# Patient Record
Sex: MALE | Race: WHITE | NOT HISPANIC OR LATINO | Employment: OTHER | URBAN - METROPOLITAN AREA
[De-identification: names, ages, dates, MRNs, and addresses within clinical notes are randomized per-mention and may not be internally consistent; named-entity substitution may affect disease eponyms.]

---

## 2017-02-23 DIAGNOSIS — I71.40 ABDOMINAL AORTIC ANEURYSM (AAA) WITHOUT RUPTURE: Primary | ICD-10-CM

## 2017-03-08 ENCOUNTER — LAB VISIT (OUTPATIENT)
Dept: LAB | Facility: HOSPITAL | Age: 67
End: 2017-03-08
Attending: SURGERY
Payer: MEDICARE

## 2017-03-08 DIAGNOSIS — I71.40 ABDOMINAL AORTIC ANEURYSM (AAA) WITHOUT RUPTURE: ICD-10-CM

## 2017-03-08 LAB
ANION GAP SERPL CALC-SCNC: 7 MMOL/L
BUN SERPL-MCNC: 11 MG/DL
CALCIUM SERPL-MCNC: 8.8 MG/DL
CHLORIDE SERPL-SCNC: 99 MMOL/L
CO2 SERPL-SCNC: 27 MMOL/L
CREAT SERPL-MCNC: 0.8 MG/DL
EST. GFR  (AFRICAN AMERICAN): >60 ML/MIN/1.73 M^2
EST. GFR  (NON AFRICAN AMERICAN): >60 ML/MIN/1.73 M^2
GLUCOSE SERPL-MCNC: 91 MG/DL
POTASSIUM SERPL-SCNC: 4.3 MMOL/L
SODIUM SERPL-SCNC: 133 MMOL/L

## 2017-03-08 PROCEDURE — 36415 COLL VENOUS BLD VENIPUNCTURE: CPT

## 2017-03-08 PROCEDURE — 80048 BASIC METABOLIC PNL TOTAL CA: CPT

## 2017-03-24 ENCOUNTER — HOSPITAL ENCOUNTER (OUTPATIENT)
Dept: RADIOLOGY | Facility: HOSPITAL | Age: 67
Discharge: HOME | End: 2017-03-24
Attending: SURGERY
Payer: MEDICARE

## 2017-03-24 ENCOUNTER — OFFICE VISIT (OUTPATIENT)
Dept: VASCULAR SURGERY | Facility: CLINIC | Age: 67
End: 2017-03-24
Payer: MEDICARE

## 2017-03-24 VITALS
TEMPERATURE: 98 F | HEIGHT: 69 IN | HEART RATE: 87 BPM | WEIGHT: 143 LBS | SYSTOLIC BLOOD PRESSURE: 120 MMHG | BODY MASS INDEX: 21.18 KG/M2 | DIASTOLIC BLOOD PRESSURE: 63 MMHG

## 2017-03-24 DIAGNOSIS — Z72.0 TOBACCO ABUSE DISORDER: ICD-10-CM

## 2017-03-24 DIAGNOSIS — I10 ESSENTIAL HYPERTENSION: ICD-10-CM

## 2017-03-24 DIAGNOSIS — F17.218 NICOTINE DEPENDENCE, CIGARETTES, WITH OTHER NICOTINE-INDUCED DISORDERS: ICD-10-CM

## 2017-03-24 DIAGNOSIS — I71.40 AAA (ABDOMINAL AORTIC ANEURYSM) WITHOUT RUPTURE: Primary | ICD-10-CM

## 2017-03-24 DIAGNOSIS — I71.40 ABDOMINAL AORTIC ANEURYSM (AAA) WITHOUT RUPTURE: ICD-10-CM

## 2017-03-24 PROCEDURE — 1159F MED LIST DOCD IN RCRD: CPT | Mod: S$GLB,,, | Performed by: SURGERY

## 2017-03-24 PROCEDURE — 74174 CTA ABD&PLVS W/CONTRAST: CPT | Mod: 26,,, | Performed by: RADIOLOGY

## 2017-03-24 PROCEDURE — 3074F SYST BP LT 130 MM HG: CPT | Mod: S$GLB,,, | Performed by: SURGERY

## 2017-03-24 PROCEDURE — 99406 BEHAV CHNG SMOKING 3-10 MIN: CPT | Mod: S$GLB,,, | Performed by: SURGERY

## 2017-03-24 PROCEDURE — 3078F DIAST BP <80 MM HG: CPT | Mod: S$GLB,,, | Performed by: SURGERY

## 2017-03-24 PROCEDURE — 1160F RVW MEDS BY RX/DR IN RCRD: CPT | Mod: S$GLB,,, | Performed by: SURGERY

## 2017-03-24 PROCEDURE — 1126F AMNT PAIN NOTED NONE PRSNT: CPT | Mod: S$GLB,,, | Performed by: SURGERY

## 2017-03-24 PROCEDURE — 1157F ADVNC CARE PLAN IN RCRD: CPT | Mod: S$GLB,,, | Performed by: SURGERY

## 2017-03-24 PROCEDURE — 99999 PR PBB SHADOW E&M-EST. PATIENT-LVL III: CPT | Mod: PBBFAC,,, | Performed by: SURGERY

## 2017-03-24 PROCEDURE — 99214 OFFICE O/P EST MOD 30 MIN: CPT | Mod: 25,S$GLB,, | Performed by: SURGERY

## 2017-03-24 RX ADMIN — IOHEXOL 100 ML: 350 INJECTION, SOLUTION INTRAVENOUS at 03:03

## 2017-03-24 NOTE — PROGRESS NOTES
Glen Arroyo  03/24/2017    HPI:  Patient is a 66 y.o. male with a PMhx of HTN, diverticulitis s/p ex-lap with appendectomy and temporary colostomy (28yrs ago), EtOH abuse (4 drinks/day x35yrs), Tobacco abuse (1ppd x50yrs) who was seen in hospital for incidental finding of AAA (7cm) after presenting with migratory abdominal pain.  Underwent EVAR on 10/20/16, had uncomplicated hospitalization was able to be discharged on POD 1.  He had experienced constipation following the EVAR, but it has resolved.  Ambulates without difficulty. Denies SOB, CP, intermittent claudication.     10/26/16 percutaneous EVAR 28 x 14 x 103 Medtronic EIIS; Iliac extension limb (left) 16 x 124 x 10; Iliac extension limb (right) 16 x 124 x 10; Bilateral iliac extension limb self-expanding stent (10 x 40 Life, 10 x 40 EV3) @ aortic bifurcation; R common, external iliac artery PTA (8 x 40, 9 x 40); L common, external iliac artery PTA (8 x 40, 9 x 40)    No prior MI/stroke  + Tobacco use, continues to smoke, but reports that he has decreased to 1/2-3/4 ppd  No FamHx of AAA    Past Medical History:   Diagnosis Date    Diverticulitis     HTN (hypertension)      Past Surgical History:   Procedure Laterality Date    APPENDECTOMY      COLOSTOMY      PLACEMENT AND REMOVAL     No family history on file.  Social History     Social History    Marital status: Single     Spouse name: N/A    Number of children: N/A    Years of education: N/A     Occupational History    Not on file.     Social History Main Topics    Smoking status: Current Every Day Smoker     Packs/day: 1.00     Types: Cigarettes    Smokeless tobacco: Not on file    Alcohol use Yes    Drug use: Not on file    Sexual activity: Not on file     Other Topics Concern    Not on file     Social History Narrative     Current Outpatient Prescriptions on File Prior to Visit   Medication Sig    amlodipine (NORVASC) 2.5 MG tablet Take 1 tablet (2.5 mg total) by mouth once daily.     aspirin (ECOTRIN) 81 MG EC tablet Take 1 tablet (81 mg total) by mouth once daily.    docusate sodium (COLACE) 100 MG capsule Take 100 mg by mouth once daily.    hydrocodone-acetaminophen 5-325mg (NORCO) 5-325 mg per tablet Take 1 tablet by mouth every 4 (four) hours as needed.    linaclotide (LINZESS) 145 mcg Cap capsule Take 145 mcg by mouth once daily.    losartan-hydrochlorothiazide 50-12.5 mg (HYZAAR) 50-12.5 mg per tablet Take 1 tablet by mouth once daily.    TRAMADOL HCL (TRAMADOL HYDROCHLORIDE, BULK, MISC) 50 mg by Misc.(Non-Drug; Combo Route) route.     Current Facility-Administered Medications on File Prior to Visit   Medication    [COMPLETED] omnipaque 350 iohexol 100 mL       REVIEW OF SYSTEMS:  General: negative; ENT: negative; Allergy and Immunology: negative; Hematological and Lymphatic: Negative; Endocrine: negative; Respiratory: no cough, shortness of breath, or wheezing; Cardiovascular: no chest pain or dyspnea on exertion; Gastrointestinal: no abdominal pain/back, change in bowel habits, or bloody stools; Genito-Urinary: no dysuria, trouble voiding, or hematuria; Musculoskeletal: negative  Neurological: no TIA or stroke symptoms; Psychiatric: no nervousness, anxiety or depression.    PHYSICAL EXAM:   Right Arm BP - Sittin/63 (17 1554)  Left Arm BP - Sittin/69 (17 1554)  Pulse: 87  Temp: 97.6 °F (36.4 °C)      General appearance:  Alert, well-appearing, and in no distress.  Oriented to person, place, and time   Neurological: Normal speech, no focal findings noted; CN II - XII grossly intact           Musculoskeletal: Digits/nail without cyanosis/clubbing.  Normal muscle strength/tone.                 Neck: Supple, no significant adenopathy; thyroid is not enlarged                Chest:  Clear to auscultation, no wheezes, rales or rhonchi, symmetric air entry     No use of accessory muscles             Cardiac: Normal rate and regular rhythm, S1 and S2 normal; PMI  non-displaced          Abdomen: Soft, nontender, nondistended, no masses or organomegaly     No rebound tenderness noted; bowel sounds normal     Pulsatile aortic mass is non palpable (possibly d/t abdominal musculature)     No groin adenopathy      Extremities:  2+ femoral pulses bilaterally     2+ DP pulses bilaterally     No ulcerations    LAB RESULTS:  Lab Results   Component Value Date    K 4.3 03/08/2017    K 3.2 (L) 10/21/2016    K 3.9 10/20/2016    CREATININE 0.8 03/08/2017    CREATININE 0.6 10/21/2016    CREATININE 0.5 10/20/2016     Lab Results   Component Value Date    WBC 4.02 10/21/2016    WBC 3.14 (L) 10/20/2016    WBC 3.73 (L) 10/19/2016    HCT 21.9 (L) 10/21/2016    HCT 24.0 (L) 10/20/2016    HCT 25.3 (L) 10/19/2016     10/21/2016     10/20/2016     10/19/2016     No results found for: HGBA1C  IMAGING:  CTA reviewed, aneurysm sac ~6.4cm, essentially unchanged, no evidence of type 1a or B endoleak, illiacs patent  CTA reviewed, aneurysm sac 6.4 cm, no evidence of type Ia or B endoleak, iliacs patent    Diagnosis:  1. AAA (abdominal aortic aneurysm) without rupture  VAS US Abdominal Aorta    X-Ray Abdomen Flat And Erect   2. Tobacco abuse disorder  VAS US Abdominal Aorta   3. Essential hypertension         IMP/PLAN:  66 y.o. male with incidental finding of 6.6 cm AAA s/p percutaneous EVAR with bilateral iliac extension with self-expanding stent at aortic bifurcation; doing well with no complaints.  CTA is essentially unchanged at ~6.4cm      1. ASA 81 po daily  2. F/u in 6 months with abdominal us and abdominal xray  3. Smoking cessation  Assistance with smoking cessation was offered, including:  [x]  Medications  [x]  Counseling  []  Printed Information on Smoking Cessation  [x]  Referral to a Smoking Cessation Program    Patient was counseled regarding smoking for 3-10 minutes.      AMIRA Walters, APRN, FNP-BC  Nurse Practitioner  Vascular and Endovascular Surgery      STAFF  ADDENDUM    I have reviewed the relevant data and the resident's assessment and agree with the findings and plan as outlined above.  Doing well following percutaneous EVAR 10/26/2016.  CTA reviewed with no evidence of endoleak and sac size decreased to 64mm (from 66mm preop).    1) AAA duplex and abdominal xray in 6 months  2) cont ASA 81mg  3) resume normal activity level    Topher Levine MD  Vascular & Endovascular Surgery

## 2018-01-29 ENCOUNTER — TELEPHONE (OUTPATIENT)
Dept: VASCULAR SURGERY | Facility: CLINIC | Age: 68
End: 2018-01-29

## 2018-01-29 NOTE — TELEPHONE ENCOUNTER
Pt only wanted to speak to Chelsea , I explained that I can help him and then replied she is the only person that knows what I need and that what I prefer. I told him she will get the message

## 2018-01-29 NOTE — TELEPHONE ENCOUNTER
----- Message from Linda Julian sent at 1/29/2018  2:16 PM CST -----  Contact: self  Glen States that he needs a call returned by a nurse in reference to his stint surgery and some questions  , Please call Glen  @ [phone] . Thanks :)

## 2018-01-30 DIAGNOSIS — I71.40 ABDOMINAL AORTIC ANEURYSM (AAA) WITHOUT RUPTURE: Primary | ICD-10-CM

## 2018-02-06 ENCOUNTER — HOSPITAL ENCOUNTER (OUTPATIENT)
Facility: HOSPITAL | Age: 68
LOS: 1 days | Discharge: HOME | End: 2018-02-07
Attending: EMERGENCY MEDICINE | Admitting: EMERGENCY MEDICINE
Payer: MEDICARE

## 2018-02-06 DIAGNOSIS — D64.9 SYMPTOMATIC ANEMIA: Primary | ICD-10-CM

## 2018-02-06 LAB
ABO + RH BLD: NORMAL
ALBUMIN SERPL BCP-MCNC: 3.8 G/DL
ALP SERPL-CCNC: 58 U/L
ALT SERPL W/O P-5'-P-CCNC: 34 U/L
ANION GAP SERPL CALC-SCNC: 6 MMOL/L
ANISOCYTOSIS BLD QL SMEAR: ABNORMAL
ANISOCYTOSIS BLD QL SMEAR: SLIGHT
APTT BLDCRRT: 26.3 SEC
AST SERPL-CCNC: 26 U/L
BASOPHILS # BLD AUTO: 0.01 K/UL
BASOPHILS # BLD AUTO: 0.01 K/UL
BASOPHILS NFR BLD: 0.4 %
BASOPHILS NFR BLD: 0.4 %
BILIRUB SERPL-MCNC: 1 MG/DL
BLD GP AB SCN CELLS X3 SERPL QL: NORMAL
BUN SERPL-MCNC: 15 MG/DL
BURR CELLS BLD QL SMEAR: ABNORMAL
CALCIUM SERPL-MCNC: 8.7 MG/DL
CHLORIDE SERPL-SCNC: 101 MMOL/L
CO2 SERPL-SCNC: 27 MMOL/L
CREAT SERPL-MCNC: 0.8 MG/DL
DIFFERENTIAL METHOD: ABNORMAL
DIFFERENTIAL METHOD: ABNORMAL
EOSINOPHIL # BLD AUTO: 0.1 K/UL
EOSINOPHIL # BLD AUTO: 0.1 K/UL
EOSINOPHIL NFR BLD: 2.1 %
EOSINOPHIL NFR BLD: 3.9 %
ERYTHROCYTE [DISTWIDTH] IN BLOOD BY AUTOMATED COUNT: 24.5 %
ERYTHROCYTE [DISTWIDTH] IN BLOOD BY AUTOMATED COUNT: 24.7 %
EST. GFR  (AFRICAN AMERICAN): >60 ML/MIN/1.73 M^2
EST. GFR  (NON AFRICAN AMERICAN): >60 ML/MIN/1.73 M^2
FERRITIN SERPL-MCNC: 883 NG/ML
GLUCOSE SERPL-MCNC: 110 MG/DL
HCT VFR BLD AUTO: 17.2 %
HCT VFR BLD AUTO: 17.4 %
HGB BLD-MCNC: 6 G/DL
HGB BLD-MCNC: 6.1 G/DL
HYPOCHROMIA BLD QL SMEAR: ABNORMAL
HYPOCHROMIA BLD QL SMEAR: ABNORMAL
INR PPP: 1.1
IRON SERPL-MCNC: 226 UG/DL
LYMPHOCYTES # BLD AUTO: 1 K/UL
LYMPHOCYTES # BLD AUTO: 1.1 K/UL
LYMPHOCYTES NFR BLD: 41.7 %
LYMPHOCYTES NFR BLD: 43.8 %
MCH RBC QN AUTO: 33.9 PG
MCH RBC QN AUTO: 35.9 PG
MCHC RBC AUTO-ENTMCNC: 34.5 G/DL
MCHC RBC AUTO-ENTMCNC: 35.5 G/DL
MCV RBC AUTO: 101 FL
MCV RBC AUTO: 98 FL
MONOCYTES # BLD AUTO: 0.2 K/UL
MONOCYTES # BLD AUTO: 0.2 K/UL
MONOCYTES NFR BLD: 6.9 %
MONOCYTES NFR BLD: 9.1 %
NEUTROPHILS # BLD AUTO: 1.1 K/UL
NEUTROPHILS # BLD AUTO: 1.1 K/UL
NEUTROPHILS NFR BLD: 44.9 %
NEUTROPHILS NFR BLD: 46.8 %
OVALOCYTES BLD QL SMEAR: ABNORMAL
PLATELET # BLD AUTO: 41 K/UL
PLATELET # BLD AUTO: 43 K/UL
PLATELET BLD QL SMEAR: ABNORMAL
PLATELET BLD QL SMEAR: ABNORMAL
PMV BLD AUTO: ABNORMAL FL
PMV BLD AUTO: ABNORMAL FL
POIKILOCYTOSIS BLD QL SMEAR: SLIGHT
POIKILOCYTOSIS BLD QL SMEAR: SLIGHT
POLYCHROMASIA BLD QL SMEAR: ABNORMAL
POLYCHROMASIA BLD QL SMEAR: ABNORMAL
POTASSIUM SERPL-SCNC: 3.6 MMOL/L
PROT SERPL-MCNC: 6.4 G/DL
PROTHROMBIN TIME: 11.9 SEC
RBC # BLD AUTO: 1.7 M/UL
RBC # BLD AUTO: 1.77 M/UL
RETICS/RBC NFR AUTO: 1.4 %
SATURATED IRON: 86 %
SODIUM SERPL-SCNC: 134 MMOL/L
TARGETS BLD QL SMEAR: ABNORMAL
TOTAL IRON BINDING CAPACITY: 262 UG/DL
TRANSFERRIN SERPL-MCNC: 177 MG/DL
WBC # BLD AUTO: 2.33 K/UL
WBC # BLD AUTO: 2.54 K/UL

## 2018-02-06 PROCEDURE — 11000001 HC ACUTE MED/SURG PRIVATE ROOM

## 2018-02-06 PROCEDURE — 85730 THROMBOPLASTIN TIME PARTIAL: CPT

## 2018-02-06 PROCEDURE — 83615 LACTATE (LD) (LDH) ENZYME: CPT

## 2018-02-06 PROCEDURE — 25000003 PHARM REV CODE 250: Performed by: EMERGENCY MEDICINE

## 2018-02-06 PROCEDURE — G0378 HOSPITAL OBSERVATION PER HR: HCPCS

## 2018-02-06 PROCEDURE — 85025 COMPLETE CBC W/AUTO DIFF WBC: CPT | Mod: 91

## 2018-02-06 PROCEDURE — 36430 TRANSFUSION BLD/BLD COMPNT: CPT

## 2018-02-06 PROCEDURE — 82728 ASSAY OF FERRITIN: CPT

## 2018-02-06 PROCEDURE — 86901 BLOOD TYPING SEROLOGIC RH(D): CPT

## 2018-02-06 PROCEDURE — 86920 COMPATIBILITY TEST SPIN: CPT

## 2018-02-06 PROCEDURE — 85045 AUTOMATED RETICULOCYTE COUNT: CPT

## 2018-02-06 PROCEDURE — 99284 EMERGENCY DEPT VISIT MOD MDM: CPT | Mod: 25

## 2018-02-06 PROCEDURE — P9021 RED BLOOD CELLS UNIT: HCPCS

## 2018-02-06 PROCEDURE — 83540 ASSAY OF IRON: CPT

## 2018-02-06 PROCEDURE — 80053 COMPREHEN METABOLIC PANEL: CPT

## 2018-02-06 PROCEDURE — 82607 VITAMIN B-12: CPT

## 2018-02-06 PROCEDURE — 85610 PROTHROMBIN TIME: CPT

## 2018-02-06 RX ORDER — ACETAMINOPHEN 325 MG/1
650 TABLET ORAL
Status: DISCONTINUED | OUTPATIENT
Start: 2018-02-06 | End: 2018-02-07

## 2018-02-06 RX ORDER — LOSARTAN POTASSIUM AND HYDROCHLOROTHIAZIDE 12.5; 1 MG/1; MG/1
1 TABLET ORAL DAILY
Status: ON HOLD | COMMUNITY
End: 2018-02-07 | Stop reason: HOSPADM

## 2018-02-06 RX ORDER — HYDROCODONE BITARTRATE AND ACETAMINOPHEN 500; 5 MG/1; MG/1
TABLET ORAL
Status: DISCONTINUED | OUTPATIENT
Start: 2018-02-06 | End: 2018-02-07 | Stop reason: HOSPADM

## 2018-02-06 RX ORDER — SODIUM CHLORIDE 0.9 % (FLUSH) 0.9 %
5 SYRINGE (ML) INJECTION
Status: DISCONTINUED | OUTPATIENT
Start: 2018-02-06 | End: 2018-02-07 | Stop reason: HOSPADM

## 2018-02-06 RX ADMIN — SODIUM CHLORIDE: 0.9 INJECTION, SOLUTION INTRAVENOUS at 07:02

## 2018-02-06 NOTE — ED NOTES
Denies dark stools,  Denies any recent illness.  Went to his pcp for his yearly check up and was called to come into the hospital.

## 2018-02-07 VITALS
RESPIRATION RATE: 18 BRPM | SYSTOLIC BLOOD PRESSURE: 122 MMHG | HEIGHT: 69 IN | BODY MASS INDEX: 21.87 KG/M2 | DIASTOLIC BLOOD PRESSURE: 60 MMHG | WEIGHT: 147.69 LBS | HEART RATE: 75 BPM | TEMPERATURE: 98 F | OXYGEN SATURATION: 97 %

## 2018-02-07 PROBLEM — D61.818 PANCYTOPENIA: Status: ACTIVE | Noted: 2018-02-07

## 2018-02-07 PROBLEM — D52.9 FOLATE DEFICIENCY ANEMIA: Status: ACTIVE | Noted: 2018-02-07

## 2018-02-07 PROBLEM — Z87.891 FORMER SMOKER: Status: ACTIVE | Noted: 2017-03-24

## 2018-02-07 LAB
ANION GAP SERPL CALC-SCNC: 4 MMOL/L
ANISOCYTOSIS BLD QL SMEAR: SLIGHT
BASOPHILS # BLD AUTO: 0 K/UL
BASOPHILS NFR BLD: 0 %
BLD PROD TYP BPU: NORMAL
BLD PROD TYP BPU: NORMAL
BLOOD UNIT EXPIRATION DATE: NORMAL
BLOOD UNIT EXPIRATION DATE: NORMAL
BLOOD UNIT TYPE CODE: 600
BLOOD UNIT TYPE CODE: 600
BLOOD UNIT TYPE: NORMAL
BLOOD UNIT TYPE: NORMAL
BUN SERPL-MCNC: 12 MG/DL
CALCIUM SERPL-MCNC: 8.9 MG/DL
CHLORIDE SERPL-SCNC: 102 MMOL/L
CO2 SERPL-SCNC: 28 MMOL/L
CODING SYSTEM: NORMAL
CODING SYSTEM: NORMAL
CREAT SERPL-MCNC: 0.8 MG/DL
DIFFERENTIAL METHOD: ABNORMAL
DISPENSE STATUS: NORMAL
DISPENSE STATUS: NORMAL
EOSINOPHIL # BLD AUTO: 0 K/UL
EOSINOPHIL NFR BLD: 1.8 %
ERYTHROCYTE [DISTWIDTH] IN BLOOD BY AUTOMATED COUNT: 19.7 %
EST. GFR  (AFRICAN AMERICAN): >60 ML/MIN/1.73 M^2
EST. GFR  (NON AFRICAN AMERICAN): >60 ML/MIN/1.73 M^2
FOLATE SERPL-MCNC: 3.2 NG/ML
GLUCOSE SERPL-MCNC: 103 MG/DL
HAPTOGLOB SERPL-MCNC: <10 MG/DL
HCT VFR BLD AUTO: 24.2 %
HGB BLD-MCNC: 8.4 G/DL
HYPOCHROMIA BLD QL SMEAR: ABNORMAL
LDH SERPL L TO P-CCNC: 240 U/L
LYMPHOCYTES # BLD AUTO: 0.7 K/UL
LYMPHOCYTES NFR BLD: 33.8 %
MCH RBC QN AUTO: 33.7 PG
MCHC RBC AUTO-ENTMCNC: 34.7 G/DL
MCV RBC AUTO: 97 FL
MONOCYTES # BLD AUTO: 0.2 K/UL
MONOCYTES NFR BLD: 6.8 %
NEUTROPHILS # BLD AUTO: 1.3 K/UL
NEUTROPHILS NFR BLD: 58.1 %
OB PNL STL: NEGATIVE
OVALOCYTES BLD QL SMEAR: ABNORMAL
PATH REV BLD -IMP: NORMAL
PLATELET # BLD AUTO: 37 K/UL
PLATELET BLD QL SMEAR: ABNORMAL
PMV BLD AUTO: ABNORMAL FL
POIKILOCYTOSIS BLD QL SMEAR: SLIGHT
POTASSIUM SERPL-SCNC: 4.3 MMOL/L
RBC # BLD AUTO: 2.49 M/UL
SCHISTOCYTES BLD QL SMEAR: ABNORMAL
SODIUM SERPL-SCNC: 134 MMOL/L
TRANS ERYTHROCYTES VOL PATIENT: NORMAL ML
TRANS ERYTHROCYTES VOL PATIENT: NORMAL ML
VIT B12 SERPL-MCNC: 280 PG/ML
WBC # BLD AUTO: 2.19 K/UL

## 2018-02-07 PROCEDURE — 82272 OCCULT BLD FECES 1-3 TESTS: CPT

## 2018-02-07 PROCEDURE — 84630 ASSAY OF ZINC: CPT

## 2018-02-07 PROCEDURE — 84165 PROTEIN E-PHORESIS SERUM: CPT | Mod: 26,,, | Performed by: PATHOLOGY

## 2018-02-07 PROCEDURE — 85060 BLOOD SMEAR INTERPRETATION: CPT | Mod: ,,, | Performed by: PATHOLOGY

## 2018-02-07 PROCEDURE — 84425 ASSAY OF VITAMIN B-1: CPT

## 2018-02-07 PROCEDURE — 36415 COLL VENOUS BLD VENIPUNCTURE: CPT

## 2018-02-07 PROCEDURE — 82747 ASSAY OF FOLIC ACID RBC: CPT

## 2018-02-07 PROCEDURE — 82746 ASSAY OF FOLIC ACID SERUM: CPT

## 2018-02-07 PROCEDURE — G0378 HOSPITAL OBSERVATION PER HR: HCPCS

## 2018-02-07 PROCEDURE — 86703 HIV-1/HIV-2 1 RESULT ANTBDY: CPT

## 2018-02-07 PROCEDURE — P9021 RED BLOOD CELLS UNIT: HCPCS

## 2018-02-07 PROCEDURE — 80048 BASIC METABOLIC PNL TOTAL CA: CPT

## 2018-02-07 PROCEDURE — 80074 ACUTE HEPATITIS PANEL: CPT

## 2018-02-07 PROCEDURE — 99204 OFFICE O/P NEW MOD 45 MIN: CPT | Mod: ,,, | Performed by: INTERNAL MEDICINE

## 2018-02-07 PROCEDURE — 83921 ORGANIC ACID SINGLE QUANT: CPT

## 2018-02-07 PROCEDURE — 84165 PROTEIN E-PHORESIS SERUM: CPT

## 2018-02-07 PROCEDURE — 83010 ASSAY OF HAPTOGLOBIN QUANT: CPT

## 2018-02-07 PROCEDURE — 86038 ANTINUCLEAR ANTIBODIES: CPT

## 2018-02-07 PROCEDURE — 82525 ASSAY OF COPPER: CPT

## 2018-02-07 PROCEDURE — 85025 COMPLETE CBC W/AUTO DIFF WBC: CPT

## 2018-02-07 PROCEDURE — 25000003 PHARM REV CODE 250: Performed by: INTERNAL MEDICINE

## 2018-02-07 RX ORDER — LOSARTAN POTASSIUM AND HYDROCHLOROTHIAZIDE 12.5; 5 MG/1; MG/1
1 TABLET ORAL DAILY
Status: DISCONTINUED | OUTPATIENT
Start: 2018-02-07 | End: 2018-02-07

## 2018-02-07 RX ORDER — LOSARTAN POTASSIUM AND HYDROCHLOROTHIAZIDE 12.5; 5 MG/1; MG/1
1 TABLET ORAL DAILY
Status: DISCONTINUED | OUTPATIENT
Start: 2018-02-08 | End: 2018-02-07

## 2018-02-07 RX ORDER — ACETAMINOPHEN 325 MG/1
650 TABLET ORAL EVERY 4 HOURS PRN
Status: DISCONTINUED | OUTPATIENT
Start: 2018-02-07 | End: 2018-02-07 | Stop reason: HOSPADM

## 2018-02-07 RX ORDER — LOSARTAN POTASSIUM AND HYDROCHLOROTHIAZIDE 12.5; 5 MG/1; MG/1
1 TABLET ORAL DAILY
Status: DISCONTINUED | OUTPATIENT
Start: 2018-02-07 | End: 2018-02-07 | Stop reason: HOSPADM

## 2018-02-07 RX ADMIN — ACETAMINOPHEN 650 MG: 325 TABLET ORAL at 12:02

## 2018-02-07 RX ADMIN — LOSARTAN POTASSIUM AND HYDROCHLOROTHIAZIDE 1 TABLET: 12.5; 5 TABLET ORAL at 12:02

## 2018-02-07 NOTE — PLAN OF CARE
"SW met with patient to complete discharge needs assessment. SW provided and reviewed with patient "[facility] Packet", "Discharge Planning Begins on Admission" brochure and "Help At Home" handout. LUIS discussed with patient the things he will be responsible for to manage his health at home. Patient prefers morning doctor appointments.      ALIA SINGH-[place] [facility]. [place] - [place] [address] [address]  Phone: [phone] Fax: [phone]             02/07/18 1442   Discharge Assessment   Assessment Type Discharge Planning Assessment   Confirmed/corrected address and phone number on facesheet? Yes   Assessment information obtained from? Patient   Prior to hospitilization cognitive status: Alert/Oriented;No Deficits   Prior to hospitalization functional status: Independent;Assistive Equipment   Current cognitive status: Alert/Oriented   Current Functional Status: Independent;Assistive Equipment   Facility Arrived From: Home   Lives With significant other   Able to Return to Prior Arrangements yes   Is patient able to care for self after discharge? Yes   Who are your caregiver(s) and their phone number(s)? Arlen (significant other) [phone]   Patient's perception of discharge disposition home or selfcare   Readmission Within The Last 30 Days no previous admission in last 30 days   Patient currently being followed by outpatient case management? No   Patient currently receives any other outside agency services? No   Equipment Currently Used at Home cane, straight;walker, rolling;bedside commode   Do you have any problems affording any of your prescribed medications? No   Is the patient taking medications as prescribed? no   Does the patient have transportation home? Yes   Transportation Available car;family or friend will provide   Does the patient receive services at the [facility]? No   Discharge Plan A Home with family  (PCP F/U)   Discharge Plan B Home with family "   Patient/Family In Agreement With Plan yes

## 2018-02-07 NOTE — ED NOTES
"Spoke to MAURICE Fleming To order the patient labs for the am,  A diet and when questioned about a consult the md reported " I am going to leave that up to the provider.   "

## 2018-02-07 NOTE — ED NOTES
Blood transfusion started. Pt. Informed of s/s of reactions and to advise the nurse if any of these things occur.   Pm. Verb. Understanding.

## 2018-02-07 NOTE — HPI
Pt is 68y/o male with pmhx of AAA, diverticuliltis, HTN , tobacco abuse and ETOH abuse referred to ED from his PCP for abnormal labs revealing severe anemia with Hb 6g/dl . Pt reports fatigue and mild generalized weakness past month. No SOB or CP. No N/V. No rashes. No HA/vision changes. No melena, hematochezia or change in bowel habits.No bleeding-rectal/nasal/urinary.  No known prior hx of abnormal blood counts.Pt s/p 2uprbc transfusion. CBC 2/7/2018 reveals wbc 2190/mm3 Hb 8.4g/dL Hct 24.2% Plt 37k. Consulted for bone marrow failure likely from ETOH abuse.

## 2018-02-07 NOTE — ASSESSMENT & PLAN NOTE
Pt with pancyctopenia , etiology unclear  S/p 2 urpbc with improvement in Hb 6g/dl to 8.4g/dl  Pt with hx of ETOH abuse which may be contributing factor  Reviewed PBS personally- NO BLASTS or morphologic abnormalities wbc, decreased plts,  Target cells present     Discussed further evaluation with bmbx inpt vs outpt  Pt elects to go home    He has been advised to abstain for ETOH use and begin OTC b12 supp    Hep profile, HIV, SPEP today    Pt agreeable with plan

## 2018-02-07 NOTE — PROGRESS NOTES
LUIS contacted Dr. Marquez office @ [phone] to schedule PCP follow-up, LUIS spoke to [address], appointment scheduled for 2/12/18 @ 10:30am.

## 2018-02-07 NOTE — PLAN OF CARE
Discharge follow-up instructions provided to patient. SW informed nurse Loretta  completed all discharge planning for patient and she could complete her nursing education/discharge with patient when ready.         02/07/18 1447   Final Note   Assessment Type Final Discharge Note   Discharge Disposition Home   Hospital Follow Up  Appt(s) scheduled? Yes   Right [facility] Referral Info   Post Acute Recommendation No Care

## 2018-02-07 NOTE — NURSING
Discharge instructions given to patient both verbally and written.   Patient and his spouse both voiced understanding of instructions.    Patient to be escorted to his car with transport in a wheelchair.   Patient's spouse to drive patient home.   Patient in stable condition.

## 2018-02-07 NOTE — H&P
[facility] Medicine  History & Physical    Patient Name: Glen Arroyo  MRN: [id number]  Admission Date: 02/07/2018  Attending Physician: Geo Gonsales MD, MPH      PCP:     José Marquez MD    CC:     Chief Complaint   Patient presents with    Abnormal Lab     Sent by PCP for H & H of 6 and 14.  C/o weakness.       HISTORY OF PRESENT ILLNESS:     Glen Arroyo is a 67 y.o. male that (in part)  has a past medical history of AAA (abdominal aortic aneurysm); Diverticulitis; and HTN (hypertension). Presents to [facility] Emergency Department complaining of generalized weakness as described below in detail.  He had routine laboratory works performed at his primary care physician's office which time he had significant anemia with hemoglobin of 6.  He was directed to come nares are further evaluation.     Description of symptoms    Location:  Generalized location  Onset:  Subacute onset is progressively worsening  Character:  Moderate to severe fatigue and generalized weakness  Frequency:  Daily  Duration:  Constant duration  Associated Symptoms:  Weakness and fatigue.  Radiation:  Generalized radiation  Exacerbating factors:  Exertion  Relieving factors:  Some relief given with transfusion of PRBCs initiated in the ED      In the emergency department routine laboratory studies were obtained including a CBC which confirmed the patient had macrocytic anemia.  Blood transfusion was ordered.    Hospital medicine has been asked to admit for further evaluation and treatment.       REVIEW OF SYSTEMS:     -- Constitutional: Weakness and fatigue.  No fever or chills.  -- Eyes: No visual changes, diplopia, pain, tearing, blind spots, or discharge.   -- Ears, nose, mouth, throat, and face: No congestion, sore throat, epistaxis, d/c, bleeding gums, neck stiffness masses, or dental issues.  -- Respiratory: No cough, shortness of breath, hemoptysis, stridor,  wheezing, or night sweats.  -- Cardiovascular: Dyspnea with exertion.  No chest pain,  syncope, PND, edema, cyanosis, or palpitations.   -- Gastrointestinal: No vomiting, abdominal pain, hematemesis, melena, dyspepsia, or change in bowel habits.  -- Genitourinary: No hematuria, dysuria, frequency, urgency, nocturia, polyuria, stones, or incontinence.  -- Integument/breast: No rash, pruritis, pigmentation changes, dryness, or changes in hair  -- Hematologic/lymphatic: No easy bruising or lymphadenopathy.   -- Musculoskeletal nonfocal subacute generalized muscle weakness.: No acute arthralgias, acute myalgias, joint swelling, acute limitations of ROM,   -- Neurological: No seizures, headaches, incoordination, paraesthesias, ataxia, vertigo, or tremors.  -- Behavioral/Psych: No auditory or visual hallucinations, depression, or suicidal/homicidal ideations.  -- Endocrine: No heat or cold intolerance, polydipsia, or unintentional weight gain / loss.  -- Allergy/Immunologic: No recurrent infections or adverse reaction to food, insects, or difficulty breathing.    Pain Scale  0 on scale of 1 to 10    PAST MEDICAL / SURGICAL HISTORY:     Past Medical History:   Diagnosis Date    AAA (abdominal aortic aneurysm) 11/20/2016    Diverticulitis     HTN (hypertension)      Past Surgical History:   Procedure Laterality Date    APPENDECTOMY      COLOSTOMY      PLACEMENT AND REMOVAL    COLOSTOMY      diverticulitis.  removed 2 foot of colon.          FAMILY HISTORY:     Family History   Problem Relation Age of Onset    Cancer Mother     Cancer Father          SOCIAL HISTORY:     Social History   Substance Use Topics    Smoking status: Former Smoker     Packs/day: 1.50     Years: 30.00     Types: Cigarettes    Smokeless tobacco: Not on file      Comment: quit 2 months ago      Alcohol use Yes     Social History     Social History    Marital status: Single     Spouse name: N/A    Number of children: N/A    Years of  education: N/A     Social History Main Topics    Smoking status: Former Smoker     Packs/day: 1.50     Years: 30.00     Types: Cigarettes    Smokeless tobacco: None      Comment: quit 2 months ago      Alcohol use Yes    Drug use: No    Sexual activity: Yes     Other Topics Concern    None     Social History Narrative    None         ALLERGIES:       Review of patient's allergies indicates:  No Known Allergies      HEALTH SCREENING:     Influenza vaccine not up-to-date for this season.  Prevnar 13 pneumonia vaccine = no evidence of previous vaccination found in the medical record      HOME MEDICATIONS:     Prior to Admission medications    Medication Sig Start Date End Date Taking? Authorizing Provider   losartan-hydrochlorothiazide 100-12.5 mg (HYZAAR) 100-12.5 mg Tab Take 1 tablet by mouth once daily.   Yes Historical Provider, MD   losartan-hydrochlorothiazide 50-12.5 mg (HYZAAR) 50-12.5 mg per tablet Take 1 tablet by mouth once daily.   Yes Historical Provider, MD   aspirin (ECOTRIN) 81 MG EC tablet Take 1 tablet (81 mg total) by mouth once daily. 10/21/16 10/21/17  Sri Mac MD   docusate sodium (COLACE) 100 MG capsule Take 100 mg by mouth once daily.    Historical Provider, MD   hydrocodone-acetaminophen 5-325mg (NORCO) 5-325 mg per tablet Take 1 tablet by mouth every 4 (four) hours as needed. 10/21/16   Sri Mac MD   linaclotide (LINZESS) 145 mcg Cap capsule Take 145 mcg by mouth once daily.    Historical Provider, MD   TRAMADOL HCL (TRAMADOL HYDROCHLORIDE, BULK, MISC) 50 mg by Misc.(Non-Drug; Combo Route) route.    Historical Provider, MD          HOSPITAL MEDICATIONS:     Scheduled Meds:    [START ON 2/8/2018] losartan-hydrochlorothiazide 50-12.5 mg  1 tablet Oral Daily     Continuous Infusions:   PRN Meds: sodium chloride, acetaminophen, sodium chloride 0.9%      PHYSICAL EXAM:     Wt Readings from Last 1 Encounters:   02/07/18 0000 67 kg (147 lb 11.3 oz)   02/06/18 1412 66.7  "kg (147 lb)     Body mass index is 21.81 kg/m².  Vitals:    02/07/18 0000 02/07/18 0115 02/07/18 0130 02/07/18 0345   BP:  (!) 129/58 127/63 121/60   BP Location:       Patient Position:       Pulse:  74 75 71   Resp:  18 18 18   Temp:  98 °F (36.7 °C) 98 °F (36.7 °C) 98.3 °F (36.8 °C)   TempSrc:  Oral Oral Oral   SpO2:  95% 95% 96%   Weight: 67 kg (147 lb 11.3 oz)      Height: 5' 9" (1.753 m)             -- General appearance: well developed. appears stated age   -- Head: normocephalic, atraumatic   -- Eyes: Pale conjunctivae. Extraocular muscles intact  -- Nose: Nares normal. Septum midline.   -- Mouth/Throat: lips, mucosa, and tongue normal. no throat erythema.   -- Neck: supple, symmetrical, trachea midline, no JVD and thyroid not grossly enlarged, appears symmetric  -- Lungs: Decreased breath sounds to auscultation bilaterally with prolonged expiratory phase and poor air excursion consistent with long-term smoker. normal respiratory effort. No use of accessory muscles.   -- Chest wall: no tenderness. equal bilateral chest rise   -- Heart: regular rate and regular rhythm. S1, S2 normal.  no click, rub or gallop   -- Abdomen: soft, non-tender, non-distended, non-tympanic; bowel sounds normal; no masses  -- Extremities: no cyanosis, clubbing or edema.   -- Pulses: 2+ and symmetric   -- Skin: Skin pallor, texture normal, turgor normal. No rashes or lesions.   -- Neurologic: Normal strength and tone. No focal numbness or weakness. CNII-XII intact. Woodward coma scale: eyes open spontaneously-4, oriented & converses-5, obeys commands-6.      LABORATORY STUDIES:     Recent Results (from the past 36 hour(s))   CBC auto differential    Collection Time: 02/06/18  2:40 PM   Result Value Ref Range    WBC 2.54 (L) 3.90 - 12.70 K/uL    RBC 1.77 (L) 4.60 - 6.20 M/uL    Hemoglobin 6.0 (L) 14.0 - 18.0 g/dL    Hematocrit 17.4 (LL) 40.0 - 54.0 %    MCV 98 82 - 98 fL    MCH 33.9 (H) 27.0 - 31.0 pg    MCHC 34.5 32.0 - 36.0 g/dL    " RDW 24.5 (H) 11.5 - 14.5 %    Platelets 43 (L) 150 - 350 K/uL    MPV SEE COMMENT 9.2 - 12.9 fL    Gran # (ANC) 1.1 (L) 1.8 - 7.7 K/uL    Lymph # 1.1 1.0 - 4.8 K/uL    Mono # 0.2 (L) 0.3 - 1.0 K/uL    Eos # 0.1 0.0 - 0.5 K/uL    Baso # 0.01 0.00 - 0.20 K/uL    Gran% 44.9 38.0 - 73.0 %    Lymph% 41.7 18.0 - 48.0 %    Mono% 9.1 4.0 - 15.0 %    Eosinophil% 3.9 0.0 - 8.0 %    Basophil% 0.4 0.0 - 1.9 %    Platelet Estimate Decreased (A)     Aniso Moderate     Poik Slight     Poly Occasional     Hypo Occasional     Ovalocytes Occasional     Target Cells Occasional     Differential Method Automated    Comprehensive metabolic panel    Collection Time: 02/06/18  2:40 PM   Result Value Ref Range    Sodium 134 (L) 136 - 145 mmol/L    Potassium 3.6 3.5 - 5.1 mmol/L    Chloride 101 95 - 110 mmol/L    CO2 27 23 - 29 mmol/L    Glucose 110 70 - 110 mg/dL    BUN, Bld 15 8 - 23 mg/dL    Creatinine 0.8 0.5 - 1.4 mg/dL    Calcium 8.7 8.7 - 10.5 mg/dL    Total Protein 6.4 6.0 - 8.4 g/dL    Albumin 3.8 3.5 - 5.2 g/dL    Total Bilirubin 1.0 0.1 - 1.0 mg/dL    Alkaline Phosphatase 58 55 - 135 U/L    AST 26 10 - 40 U/L    ALT 34 10 - 44 U/L    Anion Gap 6 (L) 8 - 16 mmol/L    eGFR if African American >60 >60 mL/min/1.73 m^2    eGFR if non African American >60 >60 mL/min/1.73 m^2   Type & Screen    Collection Time: 02/06/18  2:40 PM   Result Value Ref Range    Group & Rh A NEG     Indirect Moiz NEG    Protime-INR    Collection Time: 02/06/18  2:40 PM   Result Value Ref Range    Prothrombin Time 11.9 9.0 - 12.5 sec    INR 1.1 0.8 - 1.2   APTT    Collection Time: 02/06/18  2:40 PM   Result Value Ref Range    aPTT 26.3 21.0 - 32.0 sec   Prepare RBC 2 Units; symptomatic anemia, Hgb 6    Collection Time: 02/06/18  2:40 PM   Result Value Ref Range    UNIT NUMBER [id number]     PRODUCT CODE X5058T56     DISPENSE STATUS ISSUED     CODING SYSTEM IWAR282     Unit Blood Type Code 0600     Unit Blood Type A NEG     Unit Expiration [id number]      UNIT NUMBER [id number]     PRODUCT CODE J3683J89     DISPENSE STATUS TRANSFUSED     CODING SYSTEM VNWB397     Unit Blood Type Code 0600     Unit Blood Type A NEG     Unit Expiration [id number]    Lactate dehydrogenase    Collection Time: 02/06/18  2:40 PM   Result Value Ref Range     110 - 260 U/L   Reticulocytes    Collection Time: 02/06/18  3:07 PM   Result Value Ref Range    Retic 1.4 0.4 - 2.0 %   Iron and TIBC    Collection Time: 02/06/18  3:07 PM   Result Value Ref Range    Iron 226 (H) 45 - 160 ug/dL    Transferrin 177 (L) 200 - 375 mg/dL    TIBC 262 250 - 450 ug/dL    Saturated Iron 86 (H) 20 - 50 %   Ferritin    Collection Time: 02/06/18  3:07 PM   Result Value Ref Range    Ferritin 883 (H) 20.0 - 300.0 ng/mL   CBC auto differential    Collection Time: 02/06/18  3:07 PM   Result Value Ref Range    WBC 2.33 (L) 3.90 - 12.70 K/uL    RBC 1.70 (L) 4.60 - 6.20 M/uL    Hemoglobin 6.1 (L) 14.0 - 18.0 g/dL    Hematocrit 17.2 (LL) 40.0 - 54.0 %     (H) 82 - 98 fL    MCH 35.9 (H) 27.0 - 31.0 pg    MCHC 35.5 32.0 - 36.0 g/dL    RDW 24.7 (H) 11.5 - 14.5 %    Platelets 41 (L) 150 - 350 K/uL    MPV SEE COMMENT 9.2 - 12.9 fL    Gran # (ANC) 1.1 (L) 1.8 - 7.7 K/uL    Lymph # 1.0 1.0 - 4.8 K/uL    Mono # 0.2 (L) 0.3 - 1.0 K/uL    Eos # 0.1 0.0 - 0.5 K/uL    Baso # 0.01 0.00 - 0.20 K/uL    Gran% 46.8 38.0 - 73.0 %    Lymph% 43.8 18.0 - 48.0 %    Mono% 6.9 4.0 - 15.0 %    Eosinophil% 2.1 0.0 - 8.0 %    Basophil% 0.4 0.0 - 1.9 %    Platelet Estimate Decreased (A)     Aniso Slight     Poik Slight     Poly Occasional     Hypo Occasional     Xiomara Cells Occasional     Differential Method Automated        Lab Results   Component Value Date    INR 1.1 02/06/2018           ASSESSMENT & PLAN:     Primary Diagnosis:  Symptomatic anemia    Active Hospital Problems    Diagnosis  POA    *Symptomatic anemia [D64.9]  Yes     Priority: 1 - High    Essential hypertension [I10]  Yes    Tobacco abuse disorder [Z72.0]  Yes       Resolved Hospital Problems    Diagnosis Date Resolved POA   No resolved problems to display.       Symptomatic macrocytic Anemia  · Iron & TIBC replete  · Suspicious for nutritional deficiency and/or GI loss, although iron deficiency would be expected in the latter  · Will add on the following labs with pretransfusion blood, if available:  · Reticulocyte count  · LDH  · Haptoglobin  · Folate RBC  · Vitamin B1  · Vitamin B12  · Peripheral Smear  · Hemoccult stool      Essential hypertension  · Goal while inpatient is a systolic blood pressure less than 160mmHg  · BP in acceptable range at this time  · Continue current home regimen with hold parameters  · PRN antihypertensives available      Former smoker   · Patient quit smoking 2 months ago  · Congratulated patient and encouraged further cessation           VTE Risk Mitigation         Ordered     Medium Risk of VTE  Once      02/07/18 0518     Place sequential compression device  Until discontinued      02/07/18 0518     Place MICHELINE hose  Until discontinued      02/07/18 0518            Adult PRN medications available   DVT prophylaxis given       DISPOSITION:     Will admit to the Hospital Medicine service for further evaluation and treatment.    Chart reviewed and updated where applicable.    High Risk Conditions:  Patient has a condition that poses threat to life and bodily function: Symptomatic anemia      ===============================================================    Geo Gonsales MD, MPH  Department of Hospital Medicine   [facility]  [phone]  (7pm - 6am)          This note is dictated using Dragon Medical 360 voice recognition software.  There are word recognition mistakes that are occasionally missed on review.

## 2018-02-07 NOTE — PLAN OF CARE
Problem: Anemia (Adult)  Goal: Symptom Improvement  Patient will demonstrate the desired outcomes by discharge/transition of care.  Outcome: Ongoing (interventions implemented as appropriate)   02/07/18 0700   Anemia (Adult)   Symptom Improvement making progress toward outcome       Comments: Pt received two units of blood, tolerated well.   No sign or symptoms of reaction. Afebrile, vital signs WNL.

## 2018-02-07 NOTE — CONSULTS
[facility]  Hematology/Oncology  Consult Note    Patient Name: Glen Arroyo  MRN: [id number]  Admission Date: 2/6/2018  Hospital Length of Stay: 1 days  Code Status: Prior   Attending Provider: Chelsea Macdonald MD  Consulting Provider: Paula Westbrook MD  Primary Care Physician: José Marquez MD  Principal Problem:Symptomatic anemia    Consults  Subjective:     HPI:  Pt is 66y/o male with pmhx of AAA, diverticuliltis, HTN , tobacco abuse and ETOH abuse referred to ED from his PCP for abnormal labs revealing severe anemia with Hb 6g/dl . Pt reports fatigue and mild generalized weakness past month. No SOB or CP. No N/V. No rashes. No HA/vision changes. No melena, hematochezia or change in bowel habits.No bleeding-rectal/nasal/urinary.  No known prior hx of abnormal blood counts.Pt s/p 2uprbc transfusion. CBC 2/7/2018 reveals wbc 2190/mm3 Hb 8.4g/dL Hct 24.2% Plt 37k. Consulted for bone marrow failure likely from ETOH abuse.     Oncology Treatment Plan:   [No treatment plan]    Medications:  Continuous Infusions:  Scheduled Meds:   losartan-hydrochlorothiazide 50-12.5 mg  1 tablet Oral Daily     PRN Meds:sodium chloride, acetaminophen, sodium chloride 0.9%     Review of patient's allergies indicates:  No Known Allergies     Past Medical History:   Diagnosis Date    AAA (abdominal aortic aneurysm) 11/20/2016    Diverticulitis     HTN (hypertension)      Past Surgical History:   Procedure Laterality Date    APPENDECTOMY      COLOSTOMY      PLACEMENT AND REMOVAL    COLOSTOMY      diverticulitis.  removed 2 foot of colon.      Family History     Problem Relation (Age of Onset)    Cancer Mother, Father        Social History Main Topics    Smoking status: Former Smoker     Packs/day: 1.50     Years: 30.00     Types: Cigarettes     Quit date: 12/20/2017    Smokeless tobacco: Not on file      Comment: quit 2 months ago      Alcohol use Yes    Drug use: No    Sexual activity: Yes        Review of Systems   Constitutional: Negative for appetite change, fatigue, fever and unexpected weight change.   HENT: Negative for mouth sores.    Eyes: Negative for visual disturbance.   Respiratory: Negative for cough and shortness of breath.    Cardiovascular: Negative for chest pain.   Gastrointestinal: Negative for abdominal pain and diarrhea.   Genitourinary: Negative for frequency.   Musculoskeletal: Negative for back pain.   Skin: Negative for rash.   Neurological: Negative for headaches.   Hematological: Negative for adenopathy.   Psychiatric/Behavioral: The patient is not nervous/anxious.      Objective:     Vital Signs (Most Recent):  Temp: 97.5 °F (36.4 °C) (02/07/18 1151)  Pulse: 75 (02/07/18 1151)  Resp: 18 (02/07/18 1151)  BP: 122/60 (02/07/18 1151)  SpO2: 97 % (02/07/18 1151) Vital Signs (24h Range):  Temp:  [97.5 °F (36.4 °C)-98.6 °F (37 °C)] 97.5 °F (36.4 °C)  Pulse:  [71-91] 75  Resp:  [18-22] 18  SpO2:  [95 %-100 %] 97 %  BP: (112-129)/(53-63) 122/60     Weight: 67 kg (147 lb 11.3 oz)  Body mass index is 21.81 kg/m².  Body surface area is 1.81 meters squared.      Intake/Output Summary (Last 24 hours) at 02/07/18 1305  Last data filed at 02/07/18 0700   Gross per 24 hour   Intake              451 ml   Output              300 ml   Net              151 ml       Physical Exam   Constitutional: He is oriented to person, place, and time. He appears well-developed and well-nourished.   HENT:   Head: Normocephalic.   Mouth/Throat: Oropharynx is clear and moist. No oropharyngeal exudate.   Eyes: Conjunctivae are normal. Pupils are equal, round, and reactive to light. No scleral icterus.   Neck: Normal range of motion. Neck supple. No thyromegaly present.   Cardiovascular: Normal rate, regular rhythm and normal heart sounds.    No murmur heard.  Pulmonary/Chest: Effort normal and breath sounds normal. He has no wheezes. He has no rales.   Abdominal: Soft. Bowel sounds are normal. There is no  hepatosplenomegaly. There is no tenderness. There is no rebound.   Musculoskeletal: Normal range of motion. He exhibits no edema.   Lymphadenopathy:     He has no cervical adenopathy.     He has no axillary adenopathy.        Right: No supraclavicular adenopathy present.        Left: No supraclavicular adenopathy present.   Neurological: He is alert and oriented to person, place, and time. No cranial nerve deficit.   Skin: No rash noted. No erythema.   Psychiatric: He has a normal mood and affect.       Significant Labs:   All pertinent labs within the past 24 hours have been reviewed    Diagnostic Results:  I have reviewed and interpreted all pertinent imaging results/findings within the past 24 hours    Assessment/Plan:     Pancytopenia    Pt with pancyctopenia , etiology unclear  S/p 2 urpbc with improvement in Hb 6g/dl to 8.4g/dl  Pt with hx of ETOH abuse which may be contributing factor  Reviewed PBS personally- NO BLASTS or morphologic abnormalities wbc, decreased plts,  Target cells present     Discussed further evaluation with bmbx inpt vs outpt  Pt elects to go home    He has been advised to abstain for ETOH use and begin OTC b12 supp    Hep profile, HIV, SPEP today    Pt agreeable with plan             Thank you for your consult.      Paula Westbrook MD  Hematology/Oncology  [facility]

## 2018-02-07 NOTE — PROGRESS NOTES
Follow-up Information     Paula Westbrook MD In 2 weeks.    Specialties:  Hematology and Oncology, Hematology  Why:  Bone marrow biopsy  Contact information:  [address]   [address]  [place] [address]  [phone]             José Marquez MD. Go on 2/12/2018.    Specialty:  Family Medicine  Why:  Outpatient Services, PCP Follow-up Appointment, Please arrive to clinic for 10:30AM  Contact information:  [address] [place] [address]  [phone]                   [facility]    WRITTEN HEALTHCARE AND DISCHARGE INFORMATION                        Help at Home           [phone]  After discharge for assistance [facility] On Call Nurse Care Line 24/7  Assistance    Things You are responsible For To Manage Your Care At Home:  1.    Getting your prescriptions filled   2.    Taking your medications as directed, DO NOT MISS ANY DOSES!  3.    Going to your follow-up doctor appointment. This is important because it  allow the doctor to monitor your progress and determine if  any changes need to made to your treatment plan.     Thank you for choosing [facility] for your care.  Please answer any calls you may receive from [facility] we want to continue to support you as you manage your healthcare needs. [facility] is happy to have the opportunity to serve you.     Sincerely,  Your [facility] Team,  Gissel Fay LMSW   II  [phone]

## 2018-02-07 NOTE — SUBJECTIVE & OBJECTIVE
Oncology Treatment Plan:   [No treatment plan]    Medications:  Continuous Infusions:  Scheduled Meds:   losartan-hydrochlorothiazide 50-12.5 mg  1 tablet Oral Daily     PRN Meds:sodium chloride, acetaminophen, sodium chloride 0.9%     Review of patient's allergies indicates:  No Known Allergies     Past Medical History:   Diagnosis Date    AAA (abdominal aortic aneurysm) 11/20/2016    Diverticulitis     HTN (hypertension)      Past Surgical History:   Procedure Laterality Date    APPENDECTOMY      COLOSTOMY      PLACEMENT AND REMOVAL    COLOSTOMY      diverticulitis.  removed 2 foot of colon.      Family History     Problem Relation (Age of Onset)    Cancer Mother, Father        Social History Main Topics    Smoking status: Former Smoker     Packs/day: 1.50     Years: 30.00     Types: Cigarettes     Quit date: 12/20/2017    Smokeless tobacco: Not on file      Comment: quit 2 months ago      Alcohol use Yes    Drug use: No    Sexual activity: Yes       Review of Systems   Constitutional: Negative for appetite change, fatigue, fever and unexpected weight change.   HENT: Negative for mouth sores.    Eyes: Negative for visual disturbance.   Respiratory: Negative for cough and shortness of breath.    Cardiovascular: Negative for chest pain.   Gastrointestinal: Negative for abdominal pain and diarrhea.   Genitourinary: Negative for frequency.   Musculoskeletal: Negative for back pain.   Skin: Negative for rash.   Neurological: Negative for headaches.   Hematological: Negative for adenopathy.   Psychiatric/Behavioral: The patient is not nervous/anxious.      Objective:     Vital Signs (Most Recent):  Temp: 97.5 °F (36.4 °C) (02/07/18 1151)  Pulse: 75 (02/07/18 1151)  Resp: 18 (02/07/18 1151)  BP: 122/60 (02/07/18 1151)  SpO2: 97 % (02/07/18 1151) Vital Signs (24h Range):  Temp:  [97.5 °F (36.4 °C)-98.6 °F (37 °C)] 97.5 °F (36.4 °C)  Pulse:  [71-91] 75  Resp:  [18-22] 18  SpO2:  [95 %-100 %] 97 %  BP:  (112-129)/(53-63) 122/60     Weight: 67 kg (147 lb 11.3 oz)  Body mass index is 21.81 kg/m².  Body surface area is 1.81 meters squared.      Intake/Output Summary (Last 24 hours) at 02/07/18 1305  Last data filed at 02/07/18 0700   Gross per 24 hour   Intake              451 ml   Output              300 ml   Net              151 ml       Physical Exam   Constitutional: He is oriented to person, place, and time. He appears well-developed and well-nourished.   HENT:   Head: Normocephalic.   Mouth/Throat: Oropharynx is clear and moist. No oropharyngeal exudate.   Eyes: Conjunctivae are normal. Pupils are equal, round, and reactive to light. No scleral icterus.   Neck: Normal range of motion. Neck supple. No thyromegaly present.   Cardiovascular: Normal rate, regular rhythm and normal heart sounds.    No murmur heard.  Pulmonary/Chest: Effort normal and breath sounds normal. He has no wheezes. He has no rales.   Abdominal: Soft. Bowel sounds are normal. There is no hepatosplenomegaly. There is no tenderness. There is no rebound.   Musculoskeletal: Normal range of motion. He exhibits no edema.   Lymphadenopathy:     He has no cervical adenopathy.     He has no axillary adenopathy.        Right: No supraclavicular adenopathy present.        Left: No supraclavicular adenopathy present.   Neurological: He is alert and oriented to person, place, and time. No cranial nerve deficit.   Skin: No rash noted. No erythema.   Psychiatric: He has a normal mood and affect.       Significant Labs:   All pertinent labs within the past 24 hours have been reviewed    Diagnostic Results:  I have reviewed and interpreted all pertinent imaging results/findings within the past 24 hours

## 2018-02-07 NOTE — HPI
Glen Arroyo is a 67 y.o. male that (in part)  has a past medical history of AAA (abdominal aortic aneurysm); Diverticulitis; and HTN (hypertension). Presents to [facility] Emergency Department complaining of generalized weakness as described below in detail.  He had routine laboratory works performed at his primary care physician's office which time he had significant anemia with hemoglobin of 6.  He was directed to come nares are further evaluation.     Description of symptoms    Location:  Generalized location  Onset:  Subacute onset is progressively worsening  Character:  Moderate to severe fatigue and generalized weakness  Frequency:  Daily  Duration:  Constant duration  Associated Symptoms:  Weakness and fatigue.  Radiation:  Generalized radiation  Exacerbating factors:  Exertion  Relieving factors:  Some relief given with transfusion of PRBCs initiated in the ED      In the emergency department routine laboratory studies were obtained including a CBC which confirmed the patient had macrocytic anemia.  Blood transfusion was ordered.    Hospital medicine has been asked to admit for further evaluation and treatment.

## 2018-02-08 ENCOUNTER — TELEPHONE (OUTPATIENT)
Dept: VASCULAR SURGERY | Facility: CLINIC | Age: 68
End: 2018-02-08

## 2018-02-08 LAB
ANA SER QL IF: NORMAL
FOLATE RBC-MCNC: 751 NG/ML
HAV IGM SERPL QL IA: NEGATIVE
HBV CORE IGM SERPL QL IA: NEGATIVE
HBV SURFACE AG SERPL QL IA: NEGATIVE
HCV AB SERPL QL IA: NEGATIVE
HIV 1+2 AB+HIV1 P24 AG SERPL QL IA: NEGATIVE
PATH REV BLD -IMP: NORMAL

## 2018-02-08 NOTE — HOSPITAL COURSE
H&H improved with 2 units RBCs. Mr. Arroyo reported improvement in symptoms after transfusion. He has pancytopenia with an inappropriately normal reticulocyte count, normal iron studies and B12. I suspect bone marrow toxicity from chronic alcohol use but consulted Hem/Onc for their input. Hem/Onc assisting to arrange outpatient BMBx per patient's preference. Hep profile, HIV, SPEP. Abstain from alcohol and start B12 supplement. Stable to follow up as outpatient and requesting to go home.

## 2018-02-08 NOTE — DISCHARGE SUMMARY
[facility] Hospital Medicine  Discharge Summary      Patient Name: Glen Arroyo  MRN: [id number]  Admission Date: 2/6/2018  Hospital Length of Stay: 1 days  Discharge Date and Time: 2/7/2018  4:34 PM  Attending Physician: No att. providers found   Discharging Provider: Chelsea Macdonald MD  Primary Care Provider: José Marquez MD      HPI:   Glen Arroyo is a 67 y.o. male that (in part)  has a past medical history of AAA (abdominal aortic aneurysm); Diverticulitis; and HTN (hypertension). Presents to [facility] Emergency Department complaining of generalized weakness as described below in detail.  He had routine laboratory works performed at his primary care physician's office which time he had significant anemia with hemoglobin of 6.  He was directed to come nares are further evaluation.     Description of symptoms    Location:  Generalized location  Onset:  Subacute onset is progressively worsening  Character:  Moderate to severe fatigue and generalized weakness  Frequency:  Daily  Duration:  Constant duration  Associated Symptoms:  Weakness and fatigue.  Radiation:  Generalized radiation  Exacerbating factors:  Exertion  Relieving factors:  Some relief given with transfusion of PRBCs initiated in the ED      In the emergency department routine laboratory studies were obtained including a CBC which confirmed the patient had macrocytic anemia.  Blood transfusion was ordered.    Hospital medicine has been asked to admit for further evaluation and treatment.     Hospital Course:   H&H improved with 2 units RBCs. Mr. Arroyo reported improvement in symptoms after transfusion. He has pancytopenia with an inappropriately normal reticulocyte count, normal iron studies and B12. I suspect bone marrow toxicity from chronic alcohol use but consulted Hem/Onc for their input. Hem/Onc assisting to arrange outpatient BMBx per patient's preference. Hep profile, HIV, SPEP.  Abstain from alcohol and start B12 supplement. Stable to follow up as outpatient and requesting to go home.     Final Active Diagnoses:    Diagnosis Date Noted POA    PRINCIPAL PROBLEM:  Symptomatic anemia [D64.9] 02/06/2018 Yes    Pancytopenia [D61.818] 02/07/2018 Yes    Folate deficiency anemia [D52.9] 02/07/2018 Yes    Essential hypertension [I10] 03/24/2017 Yes    Former smoker [Z87.891] 03/24/2017 Not Applicable      Problems Resolved During this Admission:    Diagnosis Date Noted Date Resolved POA       Discharged Condition: stable    Disposition: Home or Self Care    Follow Up:  Follow-up Information     Paula Westbrook MD In 2 weeks.    Specialties:  Hematology and Oncology, Hematology  Why:  Bone marrow biopsy  Contact information:  [address]  [place] [address]  [phone]             José Marquez MD. Go on 2/12/2018.    Specialty:  Family Medicine  Why:  Outpatient Services, PCP Follow-up Appointment, Please arrive to clinic for 10:30AM  Contact information:  [address] [place] [address]  [phone]                 Patient Instructions:     Diet Adult Regular     Activity as tolerated     Notify your health care provider if you experience any of the following:  increased confusion or weakness     Notify your health care provider if you experience any of the following:  persistent dizziness, light-headedness, or visual disturbances     Notify your health care provider if you experience any of the following:  worsening rash     Notify your health care provider if you experience any of the following:  severe persistent headache     Notify your health care provider if you experience any of the following:  difficulty breathing or increased cough     Notify your health care provider if you experience any of the following:  redness, tenderness, or signs of infection (pain, swelling, redness, odor or green/yellow discharge around incision site)     Notify your health  care provider if you experience any of the following:  severe uncontrolled pain     Notify your health care provider if you experience any of the following:  persistent nausea and vomiting or diarrhea     Notify your health care provider if you experience any of the following:  temperature >100.4       Pending Diagnostic Studies:     Procedure Component Value Units Date/Time    CHAPO [[id number]] Collected:  02/07/18 1329    Order Status:  Sent Lab Status:  In process Updated:  02/07/18 1330    Specimen:  Blood from Blood     Copper, serum [[id number]] Collected:  02/07/18 1329    Order Status:  Sent Lab Status:  In process Updated:  02/07/18 1354    Specimen:  Blood from Blood     HIV-1 and HIV-2 antibodies [[id number]] Collected:  02/07/18 1329    Order Status:  Sent Lab Status:  In process Updated:  02/07/18 1330    Specimen:  Blood from Blood     Hepatitis panel, acute [[id number]] Collected:  02/07/18 1329    Order Status:  Sent Lab Status:  In process Updated:  02/07/18 1330    Specimen:  Blood from Blood     Methylmalonic acid, serum [[id number]] Collected:  02/07/18 1329    Order Status:  Sent Lab Status:  In process Updated:  02/07/18 1354    Specimen:  Blood from Blood     Protein electrophoresis, serum [[id number]] Collected:  02/07/18 1329    Order Status:  Sent Lab Status:  In process Updated:  02/07/18 1330    Specimen:  Blood from Blood     Vitamin B1 [[id number]] Collected:  02/07/18 0523    Order Status:  Sent Lab Status:  In process Updated:  02/07/18 0654    Specimen:  Blood from Blood     Zinc [[id number]] Collected:  02/07/18 1329    Order Status:  Sent Lab Status:  In process Updated:  02/07/18 1354    Specimen:  Blood from Blood          Medications:  Reconciled Home Medications:   Discharge Medication List as of 2/7/2018  3:43 PM      CONTINUE these medications which have NOT CHANGED    Details   aspirin (ECOTRIN) 81 MG EC tablet Take 1 tablet (81 mg total) by mouth once daily., Starting 10/21/2016,  Until Sat 10/21/17, OTC      losartan-hydrochlorothiazide 50-12.5 mg (HYZAAR) 50-12.5 mg per tablet Take 1 tablet by mouth once daily., Until Discontinued, Historical Med      TRAMADOL HCL (TRAMADOL HYDROCHLORIDE, BULK, MISC) 50 mg by Misc.(Non-Drug; Combo Route) route., Until Discontinued, Historical Med         STOP taking these medications       docusate sodium (COLACE) 100 MG capsule Comments:   Reason for Stopping:         hydrocodone-acetaminophen 5-325mg (NORCO) 5-325 mg per tablet Comments:   Reason for Stopping:         linaclotide (LINZESS) 145 mcg Cap capsule Comments:   Reason for Stopping:         losartan-hydrochlorothiazide 100-12.5 mg (HYZAAR) 100-12.5 mg Tab Comments:   Reason for Stopping:           Time spent on the discharge of patient: 45 minutes  Patient was seen and examined on the date of discharge and determined to be suitable for discharge.         Chelsea Macdonald MD  Department of Hospital Medicine  [facility]

## 2018-02-08 NOTE — TELEPHONE ENCOUNTER
Pt called to day to let  know that he went to ER on Wednesday due to Lab results.  His RBC's were low so they gave him 2 units of blood.  He stated that they did not find out were he was bleeding from. I relayed the information to Dr. Levine and we are seeing him on Friday 2/23/18.

## 2018-02-09 LAB
ALBUMIN SERPL ELPH-MCNC: 3.58 G/DL
ALPHA1 GLOB SERPL ELPH-MCNC: 0.31 G/DL
ALPHA2 GLOB SERPL ELPH-MCNC: 0.33 G/DL
B-GLOBULIN SERPL ELPH-MCNC: 0.68 G/DL
GAMMA GLOB SERPL ELPH-MCNC: 1.3 G/DL
PATHOLOGIST INTERPRETATION SPE: NORMAL
PROT SERPL-MCNC: 6.2 G/DL
ZINC SERPL-MCNC: 38 UG/DL (ref 60–130)

## 2018-02-12 ENCOUNTER — TELEPHONE (OUTPATIENT)
Dept: HEMATOLOGY/ONCOLOGY | Facility: CLINIC | Age: 68
End: 2018-02-12

## 2018-02-12 NOTE — TELEPHONE ENCOUNTER
----- Message from Paula Westbrook MD sent at 2/9/2018  2:54 PM CST -----  Notify pt ZINC level low 1mg/kg OTC   Instruct pt to begin OTC Zinc

## 2018-02-13 LAB
METHYLMALONATE SERPL-SCNC: 0.25 UMOL/L
VIT B1 SERPL-MCNC: 35 UG/L (ref 38–122)

## 2018-02-14 LAB — COPPER SERPL-MCNC: 1178 UG/L (ref 665–1480)

## 2018-02-16 ENCOUNTER — TELEPHONE (OUTPATIENT)
Dept: HEMATOLOGY/ONCOLOGY | Facility: CLINIC | Age: 68
End: 2018-02-16

## 2018-02-16 DIAGNOSIS — D61.818 PANCYTOPENIA: Primary | ICD-10-CM

## 2018-02-16 NOTE — TELEPHONE ENCOUNTER
The pt called stating that he would like to see Dr Westbrook before he has him bone marrow BX. The pt was instructed that it would be best if he had the BX so that Dr Westbrook would be able to have more answers. The pt stated that he did not want to do that. After talking to Dr Westbrook the she said go ahead and add the pt to the schedule and have the pt get some blood work done before the visit ( cbc with diff). The pt was instructed that Dr Westbrook could see him 2/19/2018@ 2 pm and that he needed to have labs done on 2/17/18 at the [facility] lab.The pt verbalized understanding.

## 2018-02-17 ENCOUNTER — LAB VISIT (OUTPATIENT)
Dept: LAB | Facility: HOSPITAL | Age: 68
End: 2018-02-17
Attending: INTERNAL MEDICINE
Payer: MEDICARE

## 2018-02-17 DIAGNOSIS — D61.818 PANCYTOPENIA: ICD-10-CM

## 2018-02-17 LAB
ANISOCYTOSIS BLD QL SMEAR: SLIGHT
BASOPHILS # BLD AUTO: 0.04 K/UL
BASOPHILS NFR BLD: 1.9 %
DIFFERENTIAL METHOD: ABNORMAL
EOSINOPHIL # BLD AUTO: 0.1 K/UL
EOSINOPHIL NFR BLD: 2.9 %
ERYTHROCYTE [DISTWIDTH] IN BLOOD BY AUTOMATED COUNT: 19.1 %
HCT VFR BLD AUTO: 26.1 %
HGB BLD-MCNC: 9.1 G/DL
HYPOCHROMIA BLD QL SMEAR: ABNORMAL
LYMPHOCYTES # BLD AUTO: 1 K/UL
LYMPHOCYTES NFR BLD: 49 %
MCH RBC QN AUTO: 33.8 PG
MCHC RBC AUTO-ENTMCNC: 34.9 G/DL
MCV RBC AUTO: 97 FL
MONOCYTES # BLD AUTO: 0.2 K/UL
MONOCYTES NFR BLD: 9.6 %
NEUTROPHILS # BLD AUTO: 0.8 K/UL
NEUTROPHILS NFR BLD: 36.6 %
PLATELET # BLD AUTO: 44 K/UL
PLATELET BLD QL SMEAR: ABNORMAL
PMV BLD AUTO: ABNORMAL FL
POIKILOCYTOSIS BLD QL SMEAR: SLIGHT
POLYCHROMASIA BLD QL SMEAR: ABNORMAL
RBC # BLD AUTO: 2.69 M/UL
WBC # BLD AUTO: 2.08 K/UL

## 2018-02-17 PROCEDURE — 85025 COMPLETE CBC W/AUTO DIFF WBC: CPT

## 2018-02-17 PROCEDURE — 36415 COLL VENOUS BLD VENIPUNCTURE: CPT

## 2018-02-19 ENCOUNTER — INITIAL CONSULT (OUTPATIENT)
Dept: HEMATOLOGY/ONCOLOGY | Facility: CLINIC | Age: 68
End: 2018-02-19
Payer: MEDICARE

## 2018-02-19 VITALS
HEIGHT: 69 IN | OXYGEN SATURATION: 99 % | WEIGHT: 144.81 LBS | BODY MASS INDEX: 21.45 KG/M2 | SYSTOLIC BLOOD PRESSURE: 108 MMHG | DIASTOLIC BLOOD PRESSURE: 85 MMHG | TEMPERATURE: 98 F | HEART RATE: 80 BPM

## 2018-02-19 DIAGNOSIS — D69.6 THROMBOCYTOPENIA: ICD-10-CM

## 2018-02-19 DIAGNOSIS — D72.819 LEUKOPENIA, UNSPECIFIED TYPE: ICD-10-CM

## 2018-02-19 DIAGNOSIS — F10.11 H/O ETOH ABUSE: ICD-10-CM

## 2018-02-19 DIAGNOSIS — D64.9 ANEMIA, UNSPECIFIED TYPE: ICD-10-CM

## 2018-02-19 DIAGNOSIS — D61.818 PANCYTOPENIA: Primary | ICD-10-CM

## 2018-02-19 PROCEDURE — 99214 OFFICE O/P EST MOD 30 MIN: CPT | Mod: S$GLB,,, | Performed by: INTERNAL MEDICINE

## 2018-02-19 PROCEDURE — 3079F DIAST BP 80-89 MM HG: CPT | Mod: CPTII,S$GLB,, | Performed by: INTERNAL MEDICINE

## 2018-02-19 PROCEDURE — 99999 PR PBB SHADOW E&M-EST. PATIENT-LVL III: CPT | Mod: PBBFAC,,, | Performed by: INTERNAL MEDICINE

## 2018-02-19 PROCEDURE — 99499 UNLISTED E&M SERVICE: CPT | Mod: S$GLB,,, | Performed by: INTERNAL MEDICINE

## 2018-02-19 PROCEDURE — 3074F SYST BP LT 130 MM HG: CPT | Mod: CPTII,S$GLB,, | Performed by: INTERNAL MEDICINE

## 2018-02-19 RX ORDER — FOLIC ACID 0.8 MG
800 TABLET ORAL DAILY
COMMUNITY

## 2018-02-19 RX ORDER — PNV NO.95/FERROUS FUM/FOLIC AC 28MG-0.8MG
100 TABLET ORAL DAILY
COMMUNITY

## 2018-02-19 RX ORDER — ZINC GLUCONATE 50 MG
50 TABLET ORAL DAILY
COMMUNITY

## 2018-02-19 NOTE — PROGRESS NOTES
"Subjective:       Patient ID: Glen Arroyo is a 67 y.o. male.    Chief Complaint: Follow-up    HPI     Pt here for hosp f/u for pancytopenia   HPI: Pt is 66y/o male with pmhx of AAA, diverticuliltis, HTN , tobacco abuse and ETOH abuse referred to ED from his PCP for abnormal labs revealing severe anemia with Hb 6g/dl . Pt reports fatigue and mild generalized weakness past month. No SOB or CP. No N/V. No rashes. No HA/vision changes. No melena, hematochezia or change in bowel habits.No bleeding-rectal/nasal/urinary.  No known prior hx of abnormal blood counts.Pt s/p 2uprbc transfusion. CBC 2/7/2018 reveals wbc 2190/mm3 Hb 8.4g/dL Hct 24.2% Plt 37k.     Doing well  Appetite and weight stable  No SOB/CP  No fevers/night sweats   He quit smoking " cold turkey" 2 mos ago     Past Medical History:   Diagnosis Date    AAA (abdominal aortic aneurysm) 11/20/2016    Diverticulitis     HTN (hypertension)        Past Surgical History:   Procedure Laterality Date    ABDOMINAL AORTIC ANEURYSM REPAIR      APPENDECTOMY      COLOSTOMY      PLACEMENT AND REMOVAL    COLOSTOMY      diverticulitis.  removed 2 foot of colon.        NKDA      Current MEDS: Reviewed and as per MEDCHART      Review of Systems   Constitutional: Negative for appetite change, fatigue, fever and unexpected weight change.   HENT: Negative for mouth sores.    Eyes: Negative for visual disturbance.   Respiratory: Negative for cough and shortness of breath.    Cardiovascular: Negative for chest pain.   Gastrointestinal: Negative for abdominal pain and diarrhea.   Genitourinary: Negative for frequency.   Musculoskeletal: Negative for back pain.   Skin: Negative for rash.   Neurological: Negative for headaches.   Hematological: Negative for adenopathy.   Psychiatric/Behavioral: The patient is not nervous/anxious.        Objective:       Vitals:    02/19/18 1349   BP: 108/85   BP Location: Right arm   Patient Position: Sitting   BP Method: Medium (Manual) " "  Pulse: 80   Temp: 98.1 °F (36.7 °C)   TempSrc: Oral   SpO2: 99%   Weight: 65.7 kg (144 lb 13.5 oz)   Height: 5' 9" (1.753 m)       Physical Exam   Constitutional: He is oriented to person, place, and time. He appears well-developed and well-nourished.   HENT:   Head: Normocephalic.   Mouth/Throat: Oropharynx is clear and moist. No oropharyngeal exudate.   Eyes: Conjunctivae are normal. Pupils are equal, round, and reactive to light. No scleral icterus.   Neck: Normal range of motion. Neck supple. No thyromegaly present.   Cardiovascular: Normal rate, regular rhythm and normal heart sounds.    No murmur heard.  Pulmonary/Chest: Effort normal and breath sounds normal. He has no wheezes. He has no rales.   Abdominal: Soft. Bowel sounds are normal. He exhibits no distension and no mass. There is no hepatosplenomegaly. There is no tenderness. There is no rebound and no guarding.   Musculoskeletal: Normal range of motion. He exhibits no edema.   Lymphadenopathy:     He has no cervical adenopathy.     He has no axillary adenopathy.        Right: No supraclavicular adenopathy present.        Left: No supraclavicular adenopathy present.   Neurological: He is alert and oriented to person, place, and time. No cranial nerve deficit.   Skin: No rash noted. No erythema.   Psychiatric: He has a normal mood and affect.         Lab Results   Component Value Date    WBC 2.08 (L) 02/17/2018    HGB 9.1 (L) 02/17/2018    HCT 26.1 (L) 02/17/2018    MCV 97 02/17/2018    PLT 44 (L) 02/17/2018     Results for FUNMI PALACIOS (MRN [id number]) as of 2/19/2018 14:10   Ref. Range 2/6/2018 15:07 2/7/2018 05:23   Iron Latest Ref Range: 45 - 160 ug/dL 226 (H)    TIBC Latest Ref Range: 250 - 450 ug/dL 262    Saturated Iron Latest Ref Range: 20 - 50 % 86 (H)    Transferrin Latest Ref Range: 200 - 375 mg/dL 177 (L)    Ferritin Latest Ref Range: 20.0 - 300.0 ng/mL 883 (H)    Folate Latest Ref Range: 4.0 - 24.0 ng/mL  3.2 (L)   Vitamin B-12 Latest Ref " Range: 210 - 950 pg/mL 280    Haptoglobin Latest Ref Range: 30 - 250 mg/dL  <10 (L)     Assessment:       1. Pancytopenia    2. Anemia, unspecified type    3. Thrombocytopenia    4. Leukopenia, unspecified type    5. H/O ETOH abuse        Plan:   Pt with  pancytopenia of unknown etiology   Plan BMBX for further evaluation of pancytopenia- Bone marrow failure secondary to ETOH failure vs underlying hematologic malignancy, namely MDS  Plan FISH studies   Informed consent obtained  Pt encouraged to continue to abstain from ETOH use.  All questions posed answered to patient's satisfaction.    Follow-up in 1month    25 minutes spent during this visit of which greater than 50% devoted to counseling and coordination of care regarding diagnosis and management plan.

## 2018-02-20 ENCOUNTER — OFFICE VISIT (OUTPATIENT)
Dept: UROLOGY | Facility: CLINIC | Age: 68
End: 2018-02-20
Payer: MEDICARE

## 2018-02-20 VITALS
DIASTOLIC BLOOD PRESSURE: 62 MMHG | WEIGHT: 146.63 LBS | HEIGHT: 69 IN | SYSTOLIC BLOOD PRESSURE: 100 MMHG | BODY MASS INDEX: 21.72 KG/M2 | HEART RATE: 68 BPM

## 2018-02-20 DIAGNOSIS — N40.1 BPH WITH OBSTRUCTION/LOWER URINARY TRACT SYMPTOMS: Primary | ICD-10-CM

## 2018-02-20 DIAGNOSIS — N13.8 BPH WITH OBSTRUCTION/LOWER URINARY TRACT SYMPTOMS: Primary | ICD-10-CM

## 2018-02-20 DIAGNOSIS — R33.9 INCOMPLETE EMPTYING OF BLADDER: ICD-10-CM

## 2018-02-20 DIAGNOSIS — R35.1 NOCTURIA MORE THAN TWICE PER NIGHT: ICD-10-CM

## 2018-02-20 DIAGNOSIS — N43.3 HYDROCELE, UNSPECIFIED HYDROCELE TYPE: ICD-10-CM

## 2018-02-20 PROCEDURE — 1126F AMNT PAIN NOTED NONE PRSNT: CPT | Mod: S$GLB,,, | Performed by: UROLOGY

## 2018-02-20 PROCEDURE — 99204 OFFICE O/P NEW MOD 45 MIN: CPT | Mod: 25,S$GLB,, | Performed by: UROLOGY

## 2018-02-20 PROCEDURE — 81001 URINALYSIS AUTO W/SCOPE: CPT | Mod: S$GLB,,, | Performed by: UROLOGY

## 2018-02-20 PROCEDURE — 1159F MED LIST DOCD IN RCRD: CPT | Mod: S$GLB,,, | Performed by: UROLOGY

## 2018-02-20 PROCEDURE — 3008F BODY MASS INDEX DOCD: CPT | Mod: S$GLB,,, | Performed by: UROLOGY

## 2018-02-20 PROCEDURE — 99999 PR PBB SHADOW E&M-EST. PATIENT-LVL III: CPT | Mod: PBBFAC,,, | Performed by: UROLOGY

## 2018-02-20 NOTE — PROGRESS NOTES
Subjective:       Patient ID: Glen Arroyo is a 67 y.o. male who was referred by Self, Aaareferral    Chief Complaint:   Chief Complaint   Patient presents with    Benign Prostatic Hypertrophy     new pt coming in for urologic exam         Benign Prostatic Hyperplasia  He patient reports nocturia two times a night and post void dribbling. He denies straining. The patient states symptoms are of mild severity. Onset of symptoms was several years ago and was gradual in onset.  He has no personal history and a family history of prostate cancer with his father and brother. He reports a history of no complicating symptoms. He denies flank pain, gross hematuria, kidney stones and recurrent UTI.  He is currently taking no prostate medications.    Hydrocele  He has noted a left sided enlargement of his left testicle.   This is painless and has been present for quite some time.  He recalls having a testicle infection about 30 years ago after a bout of diverticulitis.    ACTIVE MEDICAL ISSUES:  Patient Active Problem List   Diagnosis    AAA (abdominal aortic aneurysm) without rupture    Right upper quadrant abdominal pain    Former smoker    Essential hypertension    Symptomatic anemia    Pancytopenia    Folate deficiency anemia       PAST MEDICAL HISTORY  Past Medical History:   Diagnosis Date    AAA (abdominal aortic aneurysm) 11/20/2016    Diverticulitis     HTN (hypertension)        PAST SURGICAL HISTORY:  Past Surgical History:   Procedure Laterality Date    APPENDECTOMY      COLOSTOMY      PLACEMENT AND REMOVAL    COLOSTOMY      diverticulitis.  removed 2 foot of colon.        SOCIAL HISTORY:  Social History   Substance Use Topics    Smoking status: Former Smoker     Packs/day: 1.50     Years: 30.00     Types: Cigarettes     Quit date: 12/20/2017    Smokeless tobacco: Never Used      Comment: quit 2 months ago      Alcohol use Yes       FAMILY HISTORY:  Family History   Problem Relation Age of Onset  "   Cancer Mother     Cancer Father        ALLERGIES AND MEDICATIONS: updated and reviewed.  Review of patient's allergies indicates:  No Known Allergies  Current Outpatient Prescriptions   Medication Sig    cyanocobalamin (VITAMIN B-12) 100 MCG tablet Take 100 mcg by mouth once daily.    folic acid (FOLVITE) 800 MCG Tab Take 800 mcg by mouth once daily.    losartan-hydrochlorothiazide 50-12.5 mg (HYZAAR) 50-12.5 mg per tablet Take 1 tablet by mouth once daily.    zinc gluconate 50 mg tablet Take 50 mg by mouth once daily.     No current facility-administered medications for this visit.        Review of Systems   Constitutional: Negative for activity change, fatigue, fever and unexpected weight change.   HENT: Negative for congestion.    Eyes: Negative for redness.   Respiratory: Negative for chest tightness and shortness of breath.    Cardiovascular: Negative for chest pain and leg swelling.   Gastrointestinal: Negative for abdominal pain, constipation, diarrhea, nausea and vomiting.   Genitourinary: Negative for dysuria, flank pain, frequency, hematuria, penile pain, penile swelling, scrotal swelling, testicular pain and urgency.   Musculoskeletal: Negative for arthralgias and back pain.   Neurological: Negative for dizziness and light-headedness.   Psychiatric/Behavioral: Negative for behavioral problems and confusion. The patient is not nervous/anxious.    All other systems reviewed and are negative.      Objective:      Vitals:    02/20/18 1117   BP: 100/62   Pulse: 68   Weight: 66.5 kg (146 lb 9.7 oz)   Height: 5' 9" (1.753 m)     Physical Exam   Nursing note and vitals reviewed.  Constitutional: He is oriented to person, place, and time. He appears well-developed and well-nourished.   HENT:   Head: Normocephalic.   Eyes: Conjunctivae are normal.   Neck: Normal range of motion. No tracheal deviation present. No thyromegaly present.   Cardiovascular: Normal rate and normal heart sounds.    Pulmonary/Chest: " Effort normal and breath sounds normal. No respiratory distress. He has no wheezes.   Abdominal: Soft. He exhibits no distension and no mass. There is no hepatosplenomegaly. There is no tenderness. There is no rebound, no guarding and no CVA tenderness. No hernia. Hernia confirmed negative in the right inguinal area and confirmed negative in the left inguinal area.   Genitourinary: Rectum normal, testes normal and penis normal. Rectal exam shows no external hemorrhoid, no mass and no tenderness. Prostate is enlarged. Prostate is not tender. Right testis shows no mass and no tenderness. Left testis shows no mass and no tenderness. Circumcised.       Genitourinary Comments: Left hydrocele   Musculoskeletal: He exhibits no edema or tenderness.   Lymphadenopathy: No inguinal adenopathy noted on the right or left side.   Neurological: He is alert and oriented to person, place, and time.   Skin: Skin is warm and dry. No rash noted. No erythema.     Psychiatric: He has a normal mood and affect. His behavior is normal. Judgment and thought content normal.       Urine dipstick shows negative for all components.  Micro exam: negative for WBC's or RBC's.      PSA requested from PCP    Assessment:       1. BPH with obstruction/lower urinary tract symptoms    2. Nocturia more than twice per night    3. Incomplete emptying of bladder    4. Hydrocele, unspecified hydrocele type          Plan:       1. BPH with obstruction/lower urinary tract symptoms  Will get PSA from Dr. Marquez for next visit.  - US Retroperitoneal Complete (Kidney and; Future    2. Nocturia more than twice per night  Limit evening fluids  - POCT urinalysis, dipstick or tablet reag    3. Incomplete emptying of bladder      4. Hydrocele, unspecified hydrocele type    - US Scrotum And Testicles; Future            Follow-up in about 6 weeks (around 4/3/2018) for Follow up, Review X-ray.

## 2018-02-23 ENCOUNTER — OFFICE VISIT (OUTPATIENT)
Dept: VASCULAR SURGERY | Facility: CLINIC | Age: 68
End: 2018-02-23
Attending: NURSE PRACTITIONER
Payer: MEDICARE

## 2018-02-23 ENCOUNTER — HOSPITAL ENCOUNTER (OUTPATIENT)
Dept: RADIOLOGY | Facility: HOSPITAL | Age: 68
Discharge: HOME | End: 2018-02-23
Attending: SURGERY
Payer: MEDICARE

## 2018-02-23 ENCOUNTER — HOSPITAL ENCOUNTER (OUTPATIENT)
Dept: VASCULAR SURGERY | Facility: CLINIC | Age: 68
Discharge: HOME | End: 2018-02-23
Attending: NURSE PRACTITIONER
Payer: MEDICARE

## 2018-02-23 VITALS
DIASTOLIC BLOOD PRESSURE: 64 MMHG | SYSTOLIC BLOOD PRESSURE: 104 MMHG | HEART RATE: 91 BPM | HEIGHT: 69 IN | TEMPERATURE: 98 F | WEIGHT: 141.13 LBS | BODY MASS INDEX: 20.9 KG/M2

## 2018-02-23 DIAGNOSIS — I71.40 AAA (ABDOMINAL AORTIC ANEURYSM) WITHOUT RUPTURE: ICD-10-CM

## 2018-02-23 DIAGNOSIS — Z72.0 TOBACCO ABUSE DISORDER: ICD-10-CM

## 2018-02-23 DIAGNOSIS — I71.40 ABDOMINAL AORTIC ANEURYSM (AAA) WITHOUT RUPTURE: Primary | ICD-10-CM

## 2018-02-23 DIAGNOSIS — I71.40 ABDOMINAL AORTIC ANEURYSM (AAA) WITHOUT RUPTURE: ICD-10-CM

## 2018-02-23 PROCEDURE — 1126F AMNT PAIN NOTED NONE PRSNT: CPT | Mod: S$GLB,,, | Performed by: SURGERY

## 2018-02-23 PROCEDURE — 99214 OFFICE O/P EST MOD 30 MIN: CPT | Mod: S$GLB,,, | Performed by: SURGERY

## 2018-02-23 PROCEDURE — 93978 VASCULAR STUDY: CPT | Mod: S$GLB,,, | Performed by: SURGERY

## 2018-02-23 PROCEDURE — 1159F MED LIST DOCD IN RCRD: CPT | Mod: S$GLB,,, | Performed by: SURGERY

## 2018-02-23 PROCEDURE — 3008F BODY MASS INDEX DOCD: CPT | Mod: S$GLB,,, | Performed by: SURGERY

## 2018-02-23 PROCEDURE — 74019 RADEX ABDOMEN 2 VIEWS: CPT | Mod: 26,,, | Performed by: RADIOLOGY

## 2018-02-23 PROCEDURE — 99999 PR PBB SHADOW E&M-EST. PATIENT-LVL III: CPT | Mod: PBBFAC,,, | Performed by: SURGERY

## 2018-02-23 PROCEDURE — 74019 RADEX ABDOMEN 2 VIEWS: CPT | Mod: TC

## 2018-02-23 NOTE — PROGRESS NOTES
Glen Arroyo  02/23/2018    HPI:  Patient is a 66 y.o. male with a PMhx of HTN, diverticulitis s/p ex-lap with appendectomy and temporary colostomy (28yrs ago), EtOH abuse (4 drinks/day x35yrs), Tobacco abuse (1ppd x50yrs) who was seen in hospital for incidental finding of AAA (7cm) after presenting with migratory abdominal pain.  Underwent EVAR on 10/20/16, had uncomplicated hospitalization was able to be discharged on POD 1.  Ambulates without difficulty. Denies SOB, CP, intermittent claudication.   He recently quit smoking.     Of note he was noted to be anemic and required blood transfusion recently - he will undergo bone marrow biopsy in the coming weeks.     10/26/16 percutaneous EVAR 28 x 14 x 103 Medtronic EIIS; Iliac extension limb (left) 16 x 124 x 10; Iliac extension limb (right) 16 x 124 x 10; Bilateral iliac extension limb self-expanding stent (10 x 40 Life, 10 x 40 EV3) @ aortic bifurcation; R common, external iliac artery PTA (8 x 40, 9 x 40); L common, external iliac artery PTA (8 x 40, 9 x 40)    No prior MI/stroke  + Tobacco use, continues to smoke, but reports that he has decreased to 1/2-3/4 ppd  No FamHx of AAA    Past Medical History:   Diagnosis Date    AAA (abdominal aortic aneurysm) 11/20/2016    Diverticulitis     HTN (hypertension)      Past Surgical History:   Procedure Laterality Date    APPENDECTOMY      COLOSTOMY      PLACEMENT AND REMOVAL    COLOSTOMY      diverticulitis.  removed 2 foot of colon.      Family History   Problem Relation Age of Onset    Cancer Mother     Cancer Father      Social History     Social History    Marital status: Single     Spouse name: N/A    Number of children: N/A    Years of education: N/A     Occupational History    Not on file.     Social History Main Topics    Smoking status: Former Smoker     Packs/day: 1.50     Years: 30.00     Types: Cigarettes     Quit date: 12/20/2017    Smokeless tobacco: Never Used      Comment: quit 2 months  ago      Alcohol use Yes    Drug use: No    Sexual activity: Yes     Other Topics Concern    Not on file     Social History Narrative    No narrative on file     Current Outpatient Prescriptions on File Prior to Visit   Medication Sig    cyanocobalamin (VITAMIN B-12) 100 MCG tablet Take 100 mcg by mouth once daily.    folic acid (FOLVITE) 800 MCG Tab Take 800 mcg by mouth once daily.    losartan-hydrochlorothiazide 50-12.5 mg (HYZAAR) 50-12.5 mg per tablet Take 1 tablet by mouth once daily.    zinc gluconate 50 mg tablet Take 50 mg by mouth once daily.     No current facility-administered medications on file prior to visit.        REVIEW OF SYSTEMS:  General: negative; ENT: negative; Allergy and Immunology: negative; Hematological and Lymphatic: Negative; Endocrine: negative; Respiratory: no cough, shortness of breath, or wheezing; Cardiovascular: no chest pain or dyspnea on exertion; Gastrointestinal: no abdominal pain/back, change in bowel habits, or bloody stools; Genito-Urinary: no dysuria, trouble voiding, or hematuria; Musculoskeletal: negative  Neurological: no TIA or stroke symptoms; Psychiatric: no nervousness, anxiety or depression.    PHYSICAL EXAM:   Right Arm BP - Sittin/64 (18 1329)  Left Arm BP - Sittin/55 (18 1329)  Pulse: 91  Temp: 98 °F (36.7 °C)      General appearance:  Alert, well-appearing, and in no distress.  Oriented to person, place, and time   Neurological: Normal speech, no focal findings noted; CN II - XII grossly intact           Musculoskeletal: Digits/nail without cyanosis/clubbing.  Normal muscle strength/tone.                 Neck: Supple, no significant adenopathy; thyroid is not enlarged                Chest:  Clear to auscultation, no wheezes, rales or rhonchi, symmetric air entry     No use of accessory muscles             Cardiac: Normal rate and regular rhythm, S1 and S2 normal; PMI non-displaced          Abdomen: Soft, nontender, nondistended,  no masses or organomegaly     No rebound tenderness noted; bowel sounds normal     Pulsatile aortic mass is palpable     No groin adenopathy      Extremities:  2+ femoral pulses bilaterally     2+ DP pulses bilaterally     No ulcerations    LAB RESULTS:  Lab Results   Component Value Date    K 4.3 02/07/2018    K 3.6 02/06/2018    K 4.3 03/08/2017    CREATININE 0.8 02/07/2018    CREATININE 0.8 02/06/2018    CREATININE 0.8 03/08/2017     Lab Results   Component Value Date    WBC 2.08 (L) 02/17/2018    WBC 2.19 (L) 02/07/2018    WBC 2.33 (L) 02/06/2018    HCT 26.1 (L) 02/17/2018    HCT 24.2 (L) 02/07/2018    HCT 17.2 (LL) 02/06/2018    PLT 44 (L) 02/17/2018    PLT 37 (LL) 02/07/2018    PLT 41 (L) 02/06/2018     No results found for: HGBA1C  IMAGING:  AAA duplex: maximal diameter is ~7cm, consistent with previous CTA      IMP/PLAN:  67 y.o. male with incidental finding of 7 cm AAA s/p percutaneous EVAR with bilateral iliac extension with self-expanding stent at aortic bifurcation; doing well with no complaints.  Duplex AAA U/S demonstrates no appreciable size change since EVAR, but the patient's abdomen exhibits a pulsatile mass.     1) continue asa, statin  2) CTA abdomen, pelvis  3) will discuss imaging after exam        Topher Levine MD  Vascular & Endovascular Surgery

## 2018-02-25 ENCOUNTER — HOSPITAL ENCOUNTER (EMERGENCY)
Facility: HOSPITAL | Age: 68
Discharge: HOME | End: 2018-02-25
Attending: EMERGENCY MEDICINE
Payer: MEDICARE

## 2018-02-25 VITALS
HEART RATE: 74 BPM | OXYGEN SATURATION: 96 % | SYSTOLIC BLOOD PRESSURE: 117 MMHG | RESPIRATION RATE: 17 BRPM | TEMPERATURE: 98 F | HEIGHT: 69 IN | BODY MASS INDEX: 21.77 KG/M2 | WEIGHT: 147 LBS | DIASTOLIC BLOOD PRESSURE: 59 MMHG

## 2018-02-25 DIAGNOSIS — R91.8 MASS OF UPPER LOBE OF LEFT LUNG: ICD-10-CM

## 2018-02-25 DIAGNOSIS — M54.2 NECK PAIN: Primary | ICD-10-CM

## 2018-02-25 DIAGNOSIS — R51.9 GENERALIZED HEADACHE: ICD-10-CM

## 2018-02-25 DIAGNOSIS — M19.90 ARTHRITIS: ICD-10-CM

## 2018-02-25 DIAGNOSIS — R51.9 HEADACHE: ICD-10-CM

## 2018-02-25 PROCEDURE — 99284 EMERGENCY DEPT VISIT MOD MDM: CPT | Mod: 25

## 2018-02-25 PROCEDURE — 96375 TX/PRO/DX INJ NEW DRUG ADDON: CPT

## 2018-02-25 PROCEDURE — 25000003 PHARM REV CODE 250: Performed by: EMERGENCY MEDICINE

## 2018-02-25 PROCEDURE — 96376 TX/PRO/DX INJ SAME DRUG ADON: CPT

## 2018-02-25 PROCEDURE — 96372 THER/PROPH/DIAG INJ SC/IM: CPT

## 2018-02-25 PROCEDURE — 96374 THER/PROPH/DIAG INJ IV PUSH: CPT

## 2018-02-25 PROCEDURE — 63600175 PHARM REV CODE 636 W HCPCS: Performed by: EMERGENCY MEDICINE

## 2018-02-25 RX ORDER — DIAZEPAM 5 MG/1
10 TABLET ORAL
Status: COMPLETED | OUTPATIENT
Start: 2018-02-25 | End: 2018-02-25

## 2018-02-25 RX ORDER — METHOCARBAMOL 500 MG/1
1000 TABLET, FILM COATED ORAL 3 TIMES DAILY
Qty: 30 TABLET | Refills: 0 | Status: SHIPPED | OUTPATIENT
Start: 2018-02-25 | End: 2018-03-02

## 2018-02-25 RX ORDER — HYDROMORPHONE HYDROCHLORIDE 2 MG/ML
1 INJECTION, SOLUTION INTRAMUSCULAR; INTRAVENOUS; SUBCUTANEOUS
Status: COMPLETED | OUTPATIENT
Start: 2018-02-25 | End: 2018-02-25

## 2018-02-25 RX ORDER — OXYCODONE AND ACETAMINOPHEN 10; 325 MG/1; MG/1
1 TABLET ORAL EVERY 4 HOURS PRN
Qty: 16 TABLET | Refills: 0 | Status: SHIPPED | OUTPATIENT
Start: 2018-02-25 | End: 2018-03-20

## 2018-02-25 RX ORDER — BETAMETHASONE SODIUM PHOSPHATE AND BETAMETHASONE ACETATE 3; 3 MG/ML; MG/ML
12 INJECTION, SUSPENSION INTRA-ARTICULAR; INTRALESIONAL; INTRAMUSCULAR; SOFT TISSUE ONCE
Status: COMPLETED | OUTPATIENT
Start: 2018-02-25 | End: 2018-02-25

## 2018-02-25 RX ORDER — HYDROMORPHONE HYDROCHLORIDE 2 MG/ML
1 INJECTION, SOLUTION INTRAMUSCULAR; INTRAVENOUS; SUBCUTANEOUS
Status: DISCONTINUED | OUTPATIENT
Start: 2018-02-25 | End: 2018-02-25 | Stop reason: HOSPADM

## 2018-02-25 RX ORDER — ONDANSETRON 2 MG/ML
8 INJECTION INTRAMUSCULAR; INTRAVENOUS ONCE
Status: COMPLETED | OUTPATIENT
Start: 2018-02-25 | End: 2018-02-25

## 2018-02-25 RX ADMIN — ONDANSETRON HYDROCHLORIDE 8 MG: 2 INJECTION, SOLUTION INTRAMUSCULAR; INTRAVENOUS at 02:02

## 2018-02-25 RX ADMIN — HYDROMORPHONE HYDROCHLORIDE 1 MG: 2 INJECTION INTRAMUSCULAR; INTRAVENOUS; SUBCUTANEOUS at 02:02

## 2018-02-25 RX ADMIN — HYDROMORPHONE HYDROCHLORIDE 1 MG: 2 INJECTION INTRAMUSCULAR; INTRAVENOUS; SUBCUTANEOUS at 05:02

## 2018-02-25 RX ADMIN — DIAZEPAM 10 MG: 5 TABLET ORAL at 06:02

## 2018-02-25 RX ADMIN — BETAMETHASONE SODIUM PHOSPHATE AND BETAMETHASONE ACETATE 12 MG: 3; 3 INJECTION, SUSPENSION INTRA-ARTICULAR; INTRALESIONAL; INTRAMUSCULAR at 05:02

## 2018-02-25 NOTE — DISCHARGE INSTRUCTIONS
Please follow-up with your primary care doctor this week.  Percocet for pain.  Robaxin for pain.  Return immediately if you get worse or if new problems develop.  Rest.

## 2018-02-25 NOTE — ED PROVIDER NOTES
"Encounter Date: 2/25/2018    SCRIBE #1 NOTE: I, Carlita Jimenezadilene, am scribing for, and in the presence of,  Adair Nobles MD. I have scribed the following portions of the note - Other sections scribed: HPI and ROS.       History     Chief Complaint   Patient presents with    Neck Pain     "I got a bad neck ache and headache, since Friday night, can't move my neck or head." denies trauma, states got 4 xrays Friday for abdominal stents     Headache     CC: Neck Pain and Headache    HPI: This 67 y.o. Male with PMHx of anemia, blood transfusion encounter, HTN, diverticulosis, and AAA presents to the ED c/o mild left side neck pain that began after a nap approximately 3 days ago, then gradually and progressively worsened to severe bilateral neck pain. Pt reports associated severe, "throbbing", and generalized headaches that began after onset of neck pain. Neck pain is exclusively positional and is exacerbated with right maki and left maki movement. Pt denies any recent falls.  Pt also denies fever, chills, sore throat, photophobia, cough, SOB, abdominal pain, N/V/D, dysuria, rash, and any other associated symptoms.       The history is provided by the patient. No  was used.     Review of patient's allergies indicates:  No Known Allergies  Past Medical History:   Diagnosis Date    AAA (abdominal aortic aneurysm) 11/20/2016    Diverticulitis     HTN (hypertension)      Past Surgical History:   Procedure Laterality Date    APPENDECTOMY      COLOSTOMY      PLACEMENT AND REMOVAL    COLOSTOMY      diverticulitis.  removed 2 foot of colon.      Family History   Problem Relation Age of Onset    Cancer Mother     Cancer Father      Social History   Substance Use Topics    Smoking status: Former Smoker     Packs/day: 1.50     Years: 30.00     Types: Cigarettes     Quit date: 12/20/2017    Smokeless tobacco: Never Used      Comment: quit 2 months ago      Alcohol use No     Review of Systems " "  Constitutional: Negative for chills, diaphoresis and fever.   HENT: Negative for ear pain.    Eyes: Negative for pain.         (-) Photophobia   Respiratory: Negative for shortness of breath.    Cardiovascular: Negative for chest pain.   Gastrointestinal: Negative for abdominal pain, diarrhea, nausea and vomiting.   Genitourinary: Negative for dysuria.   Musculoskeletal: Positive for neck pain (mild left side neck pain, then gradually and progressively worsened to severe bilateral neck pain.). Negative for back pain.   Skin: Negative for rash.   Neurological: Positive for headaches ( severe, "throbbing", and generalized ).       Physical Exam     Initial Vitals [02/25/18 1351]   BP Pulse Resp Temp SpO2   (!) 115/58 95 20 98.1 °F (36.7 °C) 97 %      MAP       77         Physical Exam  The patient was examined specifically for the following:   General:No significant distress, Good color, Warm and dry. Head and neck:Scalp atraumatic, Neck supple. Neurological:Appropriate conversation, Gross motor deficits. Eyes:Conjugate gaze, Clear corneas. ENT: No epistaxis. Cardiac: Regular rate and rhythm, Grossly normal heart tones. Pulmonary: Wheezing, Rales. Gastrointestinal: Abdominal tenderness, Abdominal distention. Musculoskeletal: Extremity deformity, Apparent pain with range of motion of the joints. Skin: Rash.   The findings on examination were normal except for the following: The patient has exquisite tenderness at the base of his skull all the way from the left lateral side to the right lateral side of his neck.  Any rotational movement of his head produces severe pain as does movement left right front or back.  The neck is markedly tender.  It is most tender on the left lateral superior aspect.  The lungs are clear the heart tones are normal the abdomen is nontender I feel no pulsatile mass.  The patient's blood pressure is 115/58.  ED Course   Procedures  Labs Reviewed - No data to display       X-Rays: "   Independently Interpreted Readings:   Other Readings:  CT of the head reveals some vague hypodensities.  This patient has severe head pain I see no evidence of intraparenchymal bleeding or subarachnoid hemorrhage.  CT of the cervical spine reveals degenerative changes.  There is also a spiculated lung mass on the left.  I will do a dedicated CT of the chest.  This is concerning for neoplasm.  CT of the chest is pending.  Dr. Carpenter will discuss the results of the CT of the chest with patient.  I've carefully considered subarachnoid hemorrhage.  I feel this is unlikely here.  I believe that the headache is a cervical headache.  I believe that the neck pain is musculoskeletal.  The patient has degenerative changes of the cervical spine.  I doubt meningitis the patient is not febrile ill or toxic.  Any little movement of the neck causes pain and the neck is very tender.  I'll refer her to primary care.  I will treat with steroids Percocet and Robaxin.             Scribe Attestation:   Scribe #1: I performed the above scribed service and the documentation accurately describes the services I performed. I attest to the accuracy of the note.    Attending Attestation:           Physician Attestation for Scribe:  Physician Attestation Statement for Scribe #1: I, Adair Nobles MD, reviewed documentation, as scribed by Carlita Valdes in my presence, and it is both accurate and complete.                    Clinical Impression:   The primary encounter diagnosis was Neck pain. Diagnoses of Headache, Arthritis, Generalized headache, and Mass of upper lobe of left lung were also pertinent to this visit.                           Adair Nobles MD  02/26/18 [id number]

## 2018-02-26 ENCOUNTER — PES CALL (OUTPATIENT)
Dept: ADMINISTRATIVE | Facility: CLINIC | Age: 68
End: 2018-02-26

## 2018-03-08 ENCOUNTER — ANESTHESIA (OUTPATIENT)
Dept: SURGERY | Facility: HOSPITAL | Age: 68
End: 2018-03-08
Payer: MEDICARE

## 2018-03-08 ENCOUNTER — SURGERY (OUTPATIENT)
Age: 68
End: 2018-03-08

## 2018-03-08 ENCOUNTER — ANESTHESIA EVENT (OUTPATIENT)
Dept: SURGERY | Facility: HOSPITAL | Age: 68
End: 2018-03-08
Payer: MEDICARE

## 2018-03-08 ENCOUNTER — HOSPITAL ENCOUNTER (OUTPATIENT)
Facility: HOSPITAL | Age: 68
Discharge: HOME | End: 2018-03-08
Attending: INTERNAL MEDICINE | Admitting: INTERNAL MEDICINE
Payer: MEDICARE

## 2018-03-08 VITALS
HEART RATE: 93 BPM | BODY MASS INDEX: 21.77 KG/M2 | TEMPERATURE: 98 F | HEIGHT: 69 IN | SYSTOLIC BLOOD PRESSURE: 111 MMHG | OXYGEN SATURATION: 98 % | RESPIRATION RATE: 16 BRPM | WEIGHT: 147 LBS | DIASTOLIC BLOOD PRESSURE: 59 MMHG

## 2018-03-08 DIAGNOSIS — D61.818 PANCYTOPENIA: ICD-10-CM

## 2018-03-08 LAB
BONE MARROW IRON STAIN COMMENT: NORMAL
BONE MARROW WRIGHT STAIN COMMENT: NORMAL

## 2018-03-08 PROCEDURE — 88184 FLOWCYTOMETRY/ TC 1 MARKER: CPT | Performed by: PATHOLOGY

## 2018-03-08 PROCEDURE — 71000015 HC POSTOP RECOV 1ST HR: Performed by: INTERNAL MEDICINE

## 2018-03-08 PROCEDURE — 85097 BONE MARROW INTERPRETATION: CPT | Mod: ,,, | Performed by: PATHOLOGY

## 2018-03-08 PROCEDURE — D9220A PRA ANESTHESIA: Mod: ANES,,, | Performed by: ANESTHESIOLOGY

## 2018-03-08 PROCEDURE — D9220A PRA ANESTHESIA: Mod: CRNA,,, | Performed by: NURSE ANESTHETIST, CERTIFIED REGISTERED

## 2018-03-08 PROCEDURE — 88185 FLOWCYTOMETRY/TC ADD-ON: CPT | Mod: 59 | Performed by: PATHOLOGY

## 2018-03-08 PROCEDURE — 88341 IMHCHEM/IMCYTCHM EA ADD ANTB: CPT | Mod: 26,59,, | Performed by: PATHOLOGY

## 2018-03-08 PROCEDURE — 36000704 HC OR TIME LEV I 1ST 15 MIN: Performed by: INTERNAL MEDICINE

## 2018-03-08 PROCEDURE — 25000003 PHARM REV CODE 250: Performed by: INTERNAL MEDICINE

## 2018-03-08 PROCEDURE — 25000003 PHARM REV CODE 250: Performed by: ANESTHESIOLOGY

## 2018-03-08 PROCEDURE — 88264 CHROMOSOME ANALYSIS 20-25: CPT

## 2018-03-08 PROCEDURE — 88313 SPECIAL STAINS GROUP 2: CPT

## 2018-03-08 PROCEDURE — 88342 IMHCHEM/IMCYTCHM 1ST ANTB: CPT | Performed by: PATHOLOGY

## 2018-03-08 PROCEDURE — 38222 DX BONE MARROW BX & ASPIR: CPT | Mod: LT,,, | Performed by: NURSE PRACTITIONER

## 2018-03-08 PROCEDURE — 81450 HL NEO GSAP 5-50DNA/DNA&RNA: CPT

## 2018-03-08 PROCEDURE — 37000008 HC ANESTHESIA 1ST 15 MINUTES: Performed by: INTERNAL MEDICINE

## 2018-03-08 PROCEDURE — 88305 TISSUE EXAM BY PATHOLOGIST: CPT | Mod: 26,,, | Performed by: PATHOLOGY

## 2018-03-08 PROCEDURE — 71000044 HC DOSC ROUTINE RECOVERY FIRST HOUR: Performed by: INTERNAL MEDICINE

## 2018-03-08 PROCEDURE — 88237 TISSUE CULTURE BONE MARROW: CPT

## 2018-03-08 PROCEDURE — 88189 FLOWCYTOMETRY/READ 16 & >: CPT | Mod: ,,, | Performed by: PATHOLOGY

## 2018-03-08 PROCEDURE — 63600175 PHARM REV CODE 636 W HCPCS: Performed by: NURSE ANESTHETIST, CERTIFIED REGISTERED

## 2018-03-08 PROCEDURE — 88342 IMHCHEM/IMCYTCHM 1ST ANTB: CPT | Mod: 26,59,, | Performed by: PATHOLOGY

## 2018-03-08 PROCEDURE — 88311 DECALCIFY TISSUE: CPT | Mod: 26,,, | Performed by: PATHOLOGY

## 2018-03-08 PROCEDURE — 88313 SPECIAL STAINS GROUP 2: CPT | Mod: 26,,, | Performed by: PATHOLOGY

## 2018-03-08 PROCEDURE — 88299 UNLISTED CYTOGENETIC STUDY: CPT

## 2018-03-08 PROCEDURE — 88305 TISSUE EXAM BY PATHOLOGIST: CPT | Performed by: PATHOLOGY

## 2018-03-08 PROCEDURE — 36415 COLL VENOUS BLD VENIPUNCTURE: CPT

## 2018-03-08 RX ORDER — PROPOFOL 10 MG/ML
VIAL (ML) INTRAVENOUS
Status: DISCONTINUED | OUTPATIENT
Start: 2018-03-08 | End: 2018-03-08

## 2018-03-08 RX ORDER — SODIUM CHLORIDE 0.9 % (FLUSH) 0.9 %
3 SYRINGE (ML) INJECTION
Status: DISCONTINUED | OUTPATIENT
Start: 2018-03-08 | End: 2018-03-08 | Stop reason: HOSPADM

## 2018-03-08 RX ORDER — LIDOCAINE HCL/PF 100 MG/5ML
SYRINGE (ML) INTRAVENOUS
Status: DISCONTINUED | OUTPATIENT
Start: 2018-03-08 | End: 2018-03-08

## 2018-03-08 RX ORDER — LIDOCAINE HYDROCHLORIDE 10 MG/ML
INJECTION, SOLUTION EPIDURAL; INFILTRATION; INTRACAUDAL; PERINEURAL
Status: DISCONTINUED | OUTPATIENT
Start: 2018-03-08 | End: 2018-03-08 | Stop reason: HOSPADM

## 2018-03-08 RX ORDER — SODIUM CHLORIDE 9 MG/ML
INJECTION, SOLUTION INTRAVENOUS CONTINUOUS
Status: DISCONTINUED | OUTPATIENT
Start: 2018-03-08 | End: 2018-03-08 | Stop reason: HOSPADM

## 2018-03-08 RX ORDER — LIDOCAINE HYDROCHLORIDE 10 MG/ML
1 INJECTION, SOLUTION EPIDURAL; INFILTRATION; INTRACAUDAL; PERINEURAL ONCE
Status: COMPLETED | OUTPATIENT
Start: 2018-03-08 | End: 2018-03-08

## 2018-03-08 RX ADMIN — PROPOFOL 20 MG: 10 INJECTION, EMULSION INTRAVENOUS at 09:03

## 2018-03-08 RX ADMIN — LIDOCAINE HYDROCHLORIDE 20 MG: 20 INJECTION, SOLUTION INTRAVENOUS at 09:03

## 2018-03-08 RX ADMIN — LIDOCAINE HYDROCHLORIDE 1 MG: 10 INJECTION, SOLUTION EPIDURAL; INFILTRATION; INTRACAUDAL; PERINEURAL at 09:03

## 2018-03-08 RX ADMIN — PROPOFOL 40 MG: 10 INJECTION, EMULSION INTRAVENOUS at 09:03

## 2018-03-08 RX ADMIN — LIDOCAINE HYDROCHLORIDE 5 ML: 10 INJECTION, SOLUTION EPIDURAL; INFILTRATION; INTRACAUDAL; PERINEURAL at 09:03

## 2018-03-08 RX ADMIN — SODIUM CHLORIDE: 0.9 INJECTION, SOLUTION INTRAVENOUS at 09:03

## 2018-03-08 NOTE — PROCEDURES
PROCEDURE NOTE:  Bone Marrow Biopsy  Date: 3/8/18  Indication: Pancytopenia  Consent: Informed consent was obtained from patient.  Timeout: Done and documented.  Site: Left posterior illiac crest.  Prep: Betadine.  Needle used: 11 gauge Jamshidi needle.  Anesthetic: 1% lidocaine 5 cc.  Biopsy: The biopsy needle was introduced into the marrow cavity and an aspirate was obtained without complications. Core biopsy obtained and sent for routine histologic examination and cytogenetics. FISH Hold and DNA/RNA Hold sent  Complications: None.  Disposition: The patient was discharged home when appropriate per anesthesia.  Minimal blood loss  FAM Millan NP

## 2018-03-08 NOTE — ANESTHESIA PREPROCEDURE EVALUATION
03/08/2018  Glen Arroyo is a 67 y.o., male.    Anesthesia Evaluation    I have reviewed the Patient Summary Reports.     I have reviewed the Medications.     Review of Systems  Anesthesia Hx:  No problems with previous Anesthesia  History of prior surgery of interest to airway management or planning: Previous anesthesia: General   Social:  Former Smoker, No Alcohol Use    Hematology/Oncology:  Hematology Normal   Oncology Normal     EENT/Dental:EENT/Dental Normal   Cardiovascular:   Exercise tolerance: poor Hypertension, well controlled    Pulmonary:  Pulmonary Normal    Renal/:  Renal/ Normal     Hepatic/GI:  Hepatic/GI Normal    Musculoskeletal:  Musculoskeletal Normal    Neurological:  Neurology Normal    Endocrine:  Endocrine Normal    Dermatological:  Skin Normal    Psych:  Psychiatric Normal           Physical Exam  General:  Well nourished    Airway/Jaw/Neck:  Airway Findings: Mouth Opening: Normal Tongue: Normal  General Airway Assessment: Adult  Mallampati: II  TM Distance: Normal, at least 6 cm  Jaw/Neck Findings:  Neck ROM: Normal ROM      Dental:  Dental Findings: In tact        Mental Status:  Mental Status Findings:  Cooperative, Alert and Oriented         Anesthesia Plan  Type of Anesthesia, risks & benefits discussed:  Anesthesia Type:  general  Patient's Preference: GA  Intra-op Monitoring Plan: standard ASA monitors  Intra-op Monitoring Plan Comments:   Post Op Pain Control Plan:   Post Op Pain Control Plan Comments:   Induction:   IV  Beta Blocker:  Patient is not currently on a Beta-Blocker (No further documentation required).       Informed Consent: Patient understands risks and agrees with Anesthesia plan.  Questions answered. Anesthesia consent signed with patient.  ASA Score: 3     Day of Surgery Review of History & Physical:  There are no significant changes.  H&P update  referred to the provider.         Ready For Surgery From Anesthesia Perspective.

## 2018-03-08 NOTE — DISCHARGE SUMMARY
[facility]  Hematology/Oncology  Discharge Summary      Patient Name: Glen Arroyo  MRN: [id number]  Admission Date: 3/8/2018  Hospital Length of Stay: 0 days  Discharge Date and Time:  03/08/2018 8:28 AM  Attending Physician: Duke Tyler MD   Discharging Provider: Liliana Millan NP  Primary Care Provider: José Marquez MD    HPI: BMBx    Procedure(s) (LRB):  BIOPSY-BONE MARROW (Left)     Hospital Course: Patient admitted to pre op today for a bone marrow biopsy. Confirmed that consent was done for a bone marrow biopsy. Patient was sedated per anesthesia and a bone marrow biopsy and aspiration was performed in the OR. Patient was then transferred to post op and discharged home when appropriate per anesthesia.         Pending Diagnostic Studies:     None        There are no hospital problems to display for this patient.     Discharged Condition: stable    Disposition: Home or Self Care    Follow Up: With Dr. Westbrook in clinic.     Patient Instructions:     Diet Adult Regular     Activity as tolerated     Notify your health care provider if you experience any of the following:  increased confusion or weakness     Notify your health care provider if you experience any of the following:  persistent dizziness, light-headedness, or visual disturbances     Notify your health care provider if you experience any of the following:  worsening rash     Notify your health care provider if you experience any of the following:  severe persistent headache     Notify your health care provider if you experience any of the following:  difficulty breathing or increased cough     Notify your health care provider if you experience any of the following:  redness, tenderness, or signs of infection (pain, swelling, redness, odor or green/yellow discharge around incision site)     Notify your health care provider if you experience any of the following:  severe uncontrolled pain     Notify your  health care provider if you experience any of the following:  persistent nausea and vomiting or diarrhea     Notify your health care provider if you experience any of the following:  temperature >100.4       Medications:  Reconciled Home Medications:   Current Discharge Medication List      CONTINUE these medications which have NOT CHANGED    Details   cyanocobalamin (VITAMIN B-12) 100 MCG tablet Take 100 mcg by mouth once daily.      folic acid (FOLVITE) 800 MCG Tab Take 800 mcg by mouth once daily.      losartan-hydrochlorothiazide 50-12.5 mg (HYZAAR) 50-12.5 mg per tablet Take 1 tablet by mouth once daily.      oxyCODONE-acetaminophen (PERCOCET)  mg per tablet Take 1 tablet by mouth every 4 (four) hours as needed for Pain.  Qty: 16 tablet, Refills: 0      zinc gluconate 50 mg tablet Take 50 mg by mouth once daily.             Liliana Millan NP  Hematology/Oncology  [facility]

## 2018-03-08 NOTE — PROGRESS NOTES
Discharge instructions reviewed w/ pt and Janee, verbalized understanding. Pt in NADN. Complaining of baseline neck pain. Tolerated liquids w/ no issues. Pts bp was low, pt was asymptomatic, CRNA Alicia aware. Janee informed me to switch to the right arm for blood pressures, BP is now back to baseline when taken from the right arm.

## 2018-03-08 NOTE — H&P (VIEW-ONLY)
[facility]  Hematology/Oncology  Consult Note    Patient Name: Glen Arroyo  MRN: [id number]  Admission Date: 2/6/2018  Hospital Length of Stay: 1 days  Code Status: Prior   Attending Provider: Chelsea Macdonald MD  Consulting Provider: Paula Westbrook MD  Primary Care Physician: José Marquez MD  Principal Problem:Symptomatic anemia    Consults  Subjective:     HPI:  Pt is 68y/o male with pmhx of AAA, diverticuliltis, HTN , tobacco abuse and ETOH abuse referred to ED from his PCP for abnormal labs revealing severe anemia with Hb 6g/dl . Pt reports fatigue and mild generalized weakness past month. No SOB or CP. No N/V. No rashes. No HA/vision changes. No melena, hematochezia or change in bowel habits.No bleeding-rectal/nasal/urinary.  No known prior hx of abnormal blood counts.Pt s/p 2uprbc transfusion. CBC 2/7/2018 reveals wbc 2190/mm3 Hb 8.4g/dL Hct 24.2% Plt 37k. Consulted for bone marrow failure likely from ETOH abuse.     Oncology Treatment Plan:   [No treatment plan]    Medications:  Continuous Infusions:  Scheduled Meds:   losartan-hydrochlorothiazide 50-12.5 mg  1 tablet Oral Daily     PRN Meds:sodium chloride, acetaminophen, sodium chloride 0.9%     Review of patient's allergies indicates:  No Known Allergies     Past Medical History:   Diagnosis Date    AAA (abdominal aortic aneurysm) 11/20/2016    Diverticulitis     HTN (hypertension)      Past Surgical History:   Procedure Laterality Date    APPENDECTOMY      COLOSTOMY      PLACEMENT AND REMOVAL    COLOSTOMY      diverticulitis.  removed 2 foot of colon.      Family History     Problem Relation (Age of Onset)    Cancer Mother, Father        Social History Main Topics    Smoking status: Former Smoker     Packs/day: 1.50     Years: 30.00     Types: Cigarettes     Quit date: 12/20/2017    Smokeless tobacco: Not on file      Comment: quit 2 months ago      Alcohol use Yes    Drug use: No    Sexual activity: Yes        Review of Systems   Constitutional: Negative for appetite change, fatigue, fever and unexpected weight change.   HENT: Negative for mouth sores.    Eyes: Negative for visual disturbance.   Respiratory: Negative for cough and shortness of breath.    Cardiovascular: Negative for chest pain.   Gastrointestinal: Negative for abdominal pain and diarrhea.   Genitourinary: Negative for frequency.   Musculoskeletal: Negative for back pain.   Skin: Negative for rash.   Neurological: Negative for headaches.   Hematological: Negative for adenopathy.   Psychiatric/Behavioral: The patient is not nervous/anxious.      Objective:     Vital Signs (Most Recent):  Temp: 97.5 °F (36.4 °C) (02/07/18 1151)  Pulse: 75 (02/07/18 1151)  Resp: 18 (02/07/18 1151)  BP: 122/60 (02/07/18 1151)  SpO2: 97 % (02/07/18 1151) Vital Signs (24h Range):  Temp:  [97.5 °F (36.4 °C)-98.6 °F (37 °C)] 97.5 °F (36.4 °C)  Pulse:  [71-91] 75  Resp:  [18-22] 18  SpO2:  [95 %-100 %] 97 %  BP: (112-129)/(53-63) 122/60     Weight: 67 kg (147 lb 11.3 oz)  Body mass index is 21.81 kg/m².  Body surface area is 1.81 meters squared.      Intake/Output Summary (Last 24 hours) at 02/07/18 1305  Last data filed at 02/07/18 0700   Gross per 24 hour   Intake              451 ml   Output              300 ml   Net              151 ml       Physical Exam   Constitutional: He is oriented to person, place, and time. He appears well-developed and well-nourished.   HENT:   Head: Normocephalic.   Mouth/Throat: Oropharynx is clear and moist. No oropharyngeal exudate.   Eyes: Conjunctivae are normal. Pupils are equal, round, and reactive to light. No scleral icterus.   Neck: Normal range of motion. Neck supple. No thyromegaly present.   Cardiovascular: Normal rate, regular rhythm and normal heart sounds.    No murmur heard.  Pulmonary/Chest: Effort normal and breath sounds normal. He has no wheezes. He has no rales.   Abdominal: Soft. Bowel sounds are normal. There is no  hepatosplenomegaly. There is no tenderness. There is no rebound.   Musculoskeletal: Normal range of motion. He exhibits no edema.   Lymphadenopathy:     He has no cervical adenopathy.     He has no axillary adenopathy.        Right: No supraclavicular adenopathy present.        Left: No supraclavicular adenopathy present.   Neurological: He is alert and oriented to person, place, and time. No cranial nerve deficit.   Skin: No rash noted. No erythema.   Psychiatric: He has a normal mood and affect.       Significant Labs:   All pertinent labs within the past 24 hours have been reviewed    Diagnostic Results:  I have reviewed and interpreted all pertinent imaging results/findings within the past 24 hours    Assessment/Plan:     Pancytopenia    Pt with pancyctopenia , etiology unclear  S/p 2 urpbc with improvement in Hb 6g/dl to 8.4g/dl  Pt with hx of ETOH abuse which may be contributing factor  Reviewed PBS personally- NO BLASTS or morphologic abnormalities wbc, decreased plts,  Target cells present     Discussed further evaluation with bmbx inpt vs outpt  Pt elects to go home    He has been advised to abstain for ETOH use and begin OTC b12 supp    Hep profile, HIV, SPEP today    Pt agreeable with plan             Thank you for your consult.      Paula Westbrook MD  Hematology/Oncology  [facility]

## 2018-03-08 NOTE — DISCHARGE INSTRUCTIONS
Discharge instructions for having a Bone Marrow Aspiration / Biopsy    Keep Bandage in place for 24 hours.  - Do not shower or take a tube bath during this time.   - Call the nurse or physician for excessive bleeding or pain at biopsy site.  - You may take Tylenol as needed for pain.    You have received medication to sedate you.  - Do not drive a car or operate heavy machinery for the rest of the day.  - You may resume other activities as tolerated.    You can call [phone] for any problems during the hours of 8:00 AM-5:00PM.    For an emergency after 5:00 PM you can call [phone] and have the  page the Hematologist / Oncologist on call.

## 2018-03-08 NOTE — ANESTHESIA POSTPROCEDURE EVALUATION
"Anesthesia Post Evaluation    Patient: Glen Arroyo    Procedure(s) Performed: Procedure(s) (LRB):  BIOPSY-BONE MARROW (Left)    Final Anesthesia Type: general  Patient location during evaluation: PACU  Patient participation: Yes- Able to Participate  Level of consciousness: awake and alert  Post-procedure vital signs: reviewed and stable  Pain management: adequate  Airway patency: patent  PONV status at discharge: No PONV  Anesthetic complications: no      Cardiovascular status: blood pressure returned to baseline  Respiratory status: unassisted  Hydration status: euvolemic  Follow-up not needed.        Visit Vitals  BP (!) 111/59   Pulse 93   Temp 36.8 °C (98.2 °F) (Skin)   Resp 16   Ht 5' 9" (1.753 m)   Wt 66.7 kg (147 lb)   SpO2 98%   BMI 21.71 kg/m²       Pain/Salvador Score: Pain Assessment Performed: Yes (3/8/2018 10:33 AM)  Presence of Pain: denies (3/8/2018 10:33 AM)  Salvador Score: 10 (3/8/2018 10:33 AM)      "

## 2018-03-08 NOTE — TRANSFER OF CARE
"Anesthesia Transfer of Care Note    Patient: Glen Arroyo    Procedure(s) Performed: Procedure(s) (LRB):  BIOPSY-BONE MARROW (Left)    Patient location: [facility]    Anesthesia Type: general    Transport from OR: Transported from OR on room air with adequate spontaneous ventilation    Post pain: adequate analgesia    Post assessment: no apparent anesthetic complications and tolerated procedure well    Post vital signs: stable    Level of consciousness: awake    Nausea/Vomiting: no nausea/vomiting    Complications: none    Transfer of care protocol was followed      Last vitals:   Visit Vitals  BP (!) 113/59   Pulse 99   Temp 36.7 °C (98.1 °F) (Oral)   Resp 20   Ht 5' 9" (1.753 m)   Wt 66.7 kg (147 lb)   SpO2 100%   BMI 21.71 kg/m²     "

## 2018-03-12 LAB
BODY SITE - BONE MARROW: NORMAL
CLINICAL DIAGNOSIS - BONE MARROW: NORMAL
FLOW CYTOMETRY ANTIBODIES ANALYZED - BONE MARROW: NORMAL
FLOW CYTOMETRY COMMENT - BONE MARROW: NORMAL
FLOW CYTOMETRY INTERPRETATION - BONE MARROW: NORMAL

## 2018-03-13 DIAGNOSIS — I71.40 AAA (ABDOMINAL AORTIC ANEURYSM) WITHOUT RUPTURE: Primary | ICD-10-CM

## 2018-03-14 LAB
DNA/RNA EXTRACT AND HOLD RESULT: NORMAL
DNA/RNA EXTRACTION: NORMAL
EXHR SPECIMEN TYPE: NORMAL

## 2018-03-15 LAB
ANNOTATION COMMENT IMP: NORMAL
CHROM BANDING METHOD: NORMAL
CHROMOSOME ANALYSIS BM ADDITIONAL INFORMATION: NORMAL
CHROMOSOME ANALYSIS BM RELEASED BY: NORMAL
CHROMOSOME ANALYSIS BM RESULT SUMMARY: NORMAL
CLINICAL CYTOGENETICIST REVIEW: NORMAL
HOLDF INTERPRETATION: NORMAL
HOLDF REASON FOR REFERRAL: NORMAL
HOLDF RELEASED BY: NORMAL
HOLDF REQUESTED FISH TEST: NORMAL
HOLDF SOURCE: NORMAL
KARYOTYP MAR: NORMAL
MOL DX INTERP BLD/T QL: NORMAL
REASON FOR REFERRAL (NARRATIVE): NORMAL
REF LAB TEST METHOD: NORMAL
REF LAB TEST METHOD: NORMAL
SPECIMEN SOURCE: NORMAL
SPECIMEN TYPE: NORMAL
SPECIMEN: NORMAL

## 2018-03-16 ENCOUNTER — LAB VISIT (OUTPATIENT)
Dept: LAB | Facility: HOSPITAL | Age: 68
End: 2018-03-16
Attending: INTERNAL MEDICINE
Payer: MEDICARE

## 2018-03-16 DIAGNOSIS — D61.818 PANCYTOPENIA: ICD-10-CM

## 2018-03-16 DIAGNOSIS — D64.9 ANEMIA, UNSPECIFIED TYPE: ICD-10-CM

## 2018-03-16 DIAGNOSIS — D72.819 LEUKOPENIA, UNSPECIFIED TYPE: ICD-10-CM

## 2018-03-16 DIAGNOSIS — D69.6 THROMBOCYTOPENIA: ICD-10-CM

## 2018-03-16 LAB
ANISOCYTOSIS BLD QL SMEAR: SLIGHT
BASOPHILS # BLD AUTO: 0.01 K/UL
BASOPHILS NFR BLD: 0.4 %
DACRYOCYTES BLD QL SMEAR: ABNORMAL
DIFFERENTIAL METHOD: ABNORMAL
EOSINOPHIL # BLD AUTO: 0 K/UL
EOSINOPHIL NFR BLD: 1.5 %
ERYTHROCYTE [DISTWIDTH] IN BLOOD BY AUTOMATED COUNT: 19.8 %
HCT VFR BLD AUTO: 23 %
HGB BLD-MCNC: 7.7 G/DL
LYMPHOCYTES # BLD AUTO: 1.1 K/UL
LYMPHOCYTES NFR BLD: 41 %
MCH RBC QN AUTO: 32.2 PG
MCHC RBC AUTO-ENTMCNC: 33.5 G/DL
MCV RBC AUTO: 96 FL
MONOCYTES # BLD AUTO: 0.4 K/UL
MONOCYTES NFR BLD: 14.2 %
NEUTROPHILS # BLD AUTO: 1.1 K/UL
NEUTROPHILS NFR BLD: 42.9 %
OVALOCYTES BLD QL SMEAR: ABNORMAL
PLATELET # BLD AUTO: 42 K/UL
PMV BLD AUTO: ABNORMAL FL
RBC # BLD AUTO: 2.39 M/UL
WBC # BLD AUTO: 2.68 K/UL

## 2018-03-16 PROCEDURE — 85025 COMPLETE CBC W/AUTO DIFF WBC: CPT

## 2018-03-16 PROCEDURE — 36415 COLL VENOUS BLD VENIPUNCTURE: CPT

## 2018-03-20 ENCOUNTER — OFFICE VISIT (OUTPATIENT)
Dept: HEMATOLOGY/ONCOLOGY | Facility: CLINIC | Age: 68
End: 2018-03-20
Payer: MEDICARE

## 2018-03-20 VITALS
BODY MASS INDEX: 20.72 KG/M2 | OXYGEN SATURATION: 99 % | HEIGHT: 69 IN | SYSTOLIC BLOOD PRESSURE: 118 MMHG | WEIGHT: 139.88 LBS | DIASTOLIC BLOOD PRESSURE: 52 MMHG | TEMPERATURE: 98 F | HEART RATE: 115 BPM

## 2018-03-20 DIAGNOSIS — I71.40 ABDOMINAL AORTIC ANEURYSM (AAA) WITHOUT RUPTURE: Primary | ICD-10-CM

## 2018-03-20 DIAGNOSIS — D69.6 THROMBOCYTOPENIA: ICD-10-CM

## 2018-03-20 DIAGNOSIS — D46.9 MDS (MYELODYSPLASTIC SYNDROME): Primary | ICD-10-CM

## 2018-03-20 DIAGNOSIS — E53.8 FOLATE DEFICIENCY: ICD-10-CM

## 2018-03-20 DIAGNOSIS — R93.89 ABNORMAL CT OF THE CHEST: ICD-10-CM

## 2018-03-20 DIAGNOSIS — D63.0 ANEMIA IN NEOPLASTIC DISEASE: ICD-10-CM

## 2018-03-20 DIAGNOSIS — Z87.891 FORMER SMOKER: ICD-10-CM

## 2018-03-20 DIAGNOSIS — R91.1 LUNG NODULE: ICD-10-CM

## 2018-03-20 PROCEDURE — 99214 OFFICE O/P EST MOD 30 MIN: CPT | Mod: S$GLB,,, | Performed by: INTERNAL MEDICINE

## 2018-03-20 PROCEDURE — 3078F DIAST BP <80 MM HG: CPT | Mod: CPTII,S$GLB,, | Performed by: INTERNAL MEDICINE

## 2018-03-20 PROCEDURE — 99499 UNLISTED E&M SERVICE: CPT | Mod: S$GLB,,, | Performed by: INTERNAL MEDICINE

## 2018-03-20 PROCEDURE — 3074F SYST BP LT 130 MM HG: CPT | Mod: CPTII,S$GLB,, | Performed by: INTERNAL MEDICINE

## 2018-03-20 PROCEDURE — 99999 PR PBB SHADOW E&M-EST. PATIENT-LVL IV: CPT | Mod: PBBFAC,,, | Performed by: INTERNAL MEDICINE

## 2018-03-20 RX ORDER — OXYCODONE AND ACETAMINOPHEN 5; 325 MG/1; MG/1
TABLET ORAL
COMMUNITY
Start: 2018-03-12 | End: 2018-04-12 | Stop reason: CLARIF

## 2018-03-20 RX ORDER — CARISOPRODOL 350 MG/1
TABLET ORAL
Refills: 0 | COMMUNITY
Start: 2018-03-12 | End: 2018-04-12 | Stop reason: CLARIF

## 2018-03-20 NOTE — Clinical Note
Referral to leukemiia service Referral to PULM at [facility]- lung nodule Labs prior to f/u

## 2018-03-20 NOTE — PROGRESS NOTES
"Subjective:       Patient ID: Glen Arroyo is a 67 y.o. male.    Chief Complaint: Follow-up    HPI   Diagnosis: Pancytopenia     HPI: Pt is 66y/o male with pmhx of AAA, diverticuliltis, HTN , tobacco abuse and ETOH abuse hospitalized last month. He was  referred to ED from his PCP for abnormal labs revealing severe anemia with Hb 6g/dl . Pt reports fatigue and mild generalized weakness past month. No SOB or CP. No N/V. No rashes. No HA/vision changes. No melena, hematochezia or change in bowel habits.No bleeding-rectal/nasal/urinary.  No known prior hx of abnormal blood counts.Pt s/p 2uprbc transfusion. CBC 2/7/2018 reveals wbc 2190/mm3 Hb 8.4g/dL Hct 24.2% Plt 37k. He was diagnosed with Folate deficiency.    He recently underwent bmbx and here for f/u     He recently visited ED with neck pain and HA   Workup included CT chest 2/25/2018 reveals spiculated 2.0 x 0.8 cm nodule in the left lung apex  Pt reports he was told follow-up with PULM planned  He has not heard back     No cough/hemoptysis  No CP/SOB   Appetite and wt stable  \No fevers/night sweats   He quit smoking " cold turkey" 3 mos ago      He has hx of ETOH abuse  Quit ETOH use since Feb 5th        FINAL PATHOLOGIC DIAGNOSIS  BONE MARROW ASPIRATE SMEARS, TOUCH IMPRINTS, CORE BIOPSY, LEFT ILIAC CREST, AND CLOT  SECTION:  --Hypercellular bone marrow with megakaryocyte atypia and ring sideroblasts, see comment.  --Mildly increased reticulin fibrosis (MF-1).  --Mild polytypic plasmacytosis, 5.6%, see comment.    Cytogenetics abnormal Of 20 metaphases, 13 metaphases were normal and 7 metaphases had an unbalanced 1;7  translocation resulting in a 1q duplication and a 7q deletion.    Findings show myelodysplastic syndrome with single lineage dysplasia     Past Medical History:   Diagnosis Date    AAA (abdominal aortic aneurysm) 11/20/2016    Diverticulitis     HTN (hypertension)        Past Surgical History:   Procedure Laterality Date    ABDOMINAL " "AORTIC ANEURYSM REPAIR      APPENDECTOMY      COLOSTOMY      PLACEMENT AND REMOVAL    COLOSTOMY      diverticulitis.  removed 2 foot of colon.        NKDA      Current MEDS: Reviewed and as per MEDCHART      Review of Systems   Constitutional: Negative for appetite change, fatigue, fever and unexpected weight change.   HENT: Negative for mouth sores.    Eyes: Negative for visual disturbance.   Respiratory: Negative for cough and shortness of breath.    Cardiovascular: Negative for chest pain.   Gastrointestinal: Negative for abdominal pain and diarrhea.   Genitourinary: Negative for frequency.   Musculoskeletal: Negative for back pain.   Skin: Negative for rash.   Neurological: Negative for headaches.   Hematological: Negative for adenopathy.   Psychiatric/Behavioral: The patient is not nervous/anxious.        Objective:       Vitals:    03/20/18 0828 03/20/18 0834   BP: (!) 118/52    BP Location: Right arm    Patient Position: Sitting    BP Method: Medium (Manual)    Pulse: (!) 123 (!) 115   Temp: 97.8 °F (36.6 °C)    TempSrc: Oral    SpO2: 99%    Weight: 63.5 kg (139 lb 14.1 oz)    Height: 5' 9" (1.753 m)        Physical Exam   Constitutional: He is oriented to person, place, and time. He appears well-developed and well-nourished.   HENT:   Head: Normocephalic.   Mouth/Throat: Oropharynx is clear and moist. No oropharyngeal exudate.   Eyes: Conjunctivae are normal. Pupils are equal, round, and reactive to light. No scleral icterus.   Neck: Normal range of motion. Neck supple. No thyromegaly present.   Cardiovascular: Normal rate, regular rhythm and normal heart sounds.    No murmur heard.  Pulmonary/Chest: Effort normal and breath sounds normal. He has no wheezes. He has no rales.   Abdominal: Soft. Bowel sounds are normal. He exhibits no distension and no mass. There is no hepatosplenomegaly. There is no tenderness. There is no rebound and no guarding.   Musculoskeletal: Normal range of motion. He exhibits no " edema.   Lymphadenopathy:     He has no cervical adenopathy.     He has no axillary adenopathy.        Right: No supraclavicular adenopathy present.        Left: No supraclavicular adenopathy present.   Neurological: He is alert and oriented to person, place, and time. No cranial nerve deficit.   Skin: No rash noted. No erythema.   Psychiatric: He has a normal mood and affect.         Lab Results   Component Value Date    WBC 2.68 (L) 03/16/2018    HGB 7.7 (L) 03/16/2018    HCT 23.0 (L) 03/16/2018    MCV 96 03/16/2018    PLT 42 (L) 03/16/2018     ANC 1100  Results for FUNMI PALACIOS (MRN [id number]) as of 2/19/2018 14:10   Ref. Range 2/6/2018 15:07 2/7/2018 05:23   Iron Latest Ref Range: 45 - 160 ug/dL 226 (H)    TIBC Latest Ref Range: 250 - 450 ug/dL 262    Saturated Iron Latest Ref Range: 20 - 50 % 86 (H)    Transferrin Latest Ref Range: 200 - 375 mg/dL 177 (L)    Ferritin Latest Ref Range: 20.0 - 300.0 ng/mL 883 (H)    Folate Latest Ref Range: 4.0 - 24.0 ng/mL  3.2 (L)   Vitamin B-12 Latest Ref Range: 210 - 950 pg/mL 280    Haptoglobin Latest Ref Range: 30 - 250 mg/dL  <10 (L)   Results for FUNMI PALACIOS (MRN [id number]) as of 3/20/2018 14:26   Ref. Range 2/6/2018 15:07 2/7/2018 05:23   Thiamine Latest Ref Range: 38 - 122 ug/L  35 (L)   Vitamin B-12 Latest Ref Range: 210 - 950 pg/mL 280    Results for FUNMI PALACIOS (MRN [id number]) as of 3/20/2018 14:26   Ref. Range 2/7/2018 13:29   Zinc, Serum-ALT Latest Ref Range: 60 - 130 ug/dL 38 (L)   Results for FUNMI PALACIOS (MRN [id number]) as of 3/20/2018 14:26   Ref. Range 2/7/2018 13:29   Copper Latest Ref Range: 665 - 1480 ug/L 1178       Pathology 3/8/2018   FINAL PATHOLOGIC DIAGNOSIS  BONE MARROW ASPIRATE SMEARS, TOUCH IMPRINTS, CORE BIOPSY, LEFT ILIAC CREST, AND CLOT  SECTION:  --Hypercellular bone marrow with megakaryocyte atypia and ring sideroblasts, see comment.  --Mildly increased reticulin fibrosis (MF-1).  --Mild polytypic plasmacytosis, 5.6%, see  "comment.  COMMENT: The core biopsy is hypercellular for age (50%) and features panhyperplasia. The megakaryocytes are  predominantly small and hypolobated. Blasts are not increased by morphology or in the corresponding flow  cytometric analysis (please see separate report). There is mild erythroid atypia and approximately 7.5% of the  erythroid precursors are ring sideroblasts. Taken together, these findings are concerning for a myelodysplastic  syndrome; however, all other non-neoplastic causes for these features must be excluded, and correlation with the  corresponding cytogenetics analysis is required. Given the presence of ring sideroblasts, NGS including SF3B1  mutational analysis is being performed at [facility], and these results will be reported in a  supplemental report. Additionally, there is a mild polytypic plasmacytosis, which may be seen in association with  bacterial and viral infections, autoimmune conditions, cirrhosis, and in association with neoplastic disorders.    Supplemental Diagnosis  Please also see cytogenetic karyotype results reported in Epic from [facility], [address] [place] [address], which give, in part, the following results:  "The result is abnormal. Of 20 metaphases, 13 metaphases were normal and 7 metaphases had an unbalanced 1;7  translocation resulting in a 1q duplication and a 7q deletion. This translocation has been associated with both de  bill and therapy-related MDS and AML (Sanada M et al., Leukemia 21(5);992-7, 2007)."  These findings support a neoplastic etiology for the morphologic features seen, and this case is best classified as a  myelodysplastic syndrome with single lineage dysplasia pending NGS for SF3B1 mutational analysis.      CT chest w/out contrast 2/25/2018   1.  Spiculated 2.0 x 0.8 cm nodule in the left lung apex on a background of diffuse paraseptal emphysema.  The findings are concerning " for primary pulmonary malignancy.  PET/CT scan or tissue sampling is suggested for further evaluation.    2.  Airspace opacities in the bilateral lower lobes.    3. Extensive coronary artery calcifications.  This may be a marker of coronary artery disease  Assessment:       1. MDS (myelodysplastic syndrome)    2. Anemia in neoplastic disease    3. Thrombocytopenia    4. Lung nodule    5. Abnormal CT of the chest    6. Former smoker    7. Folate deficiency        Plan:   1-7  67-year-old male with significant comorbidities with newly diagnosed MDS with single lineage dysplasia with  abnormal cytogenetics  IPSS-R INT   Hb 7.7g/dl  ANC 1100  plt ct 46k  HIV NEG  Hep panel NEG  Discussed pathology findings in detail with patient.   Plan [place] [facility] [place] and treatment recommendations in 66y/o male newly diagnosed MDS IPSS-R INT with sig comorbidities and newly diagnosed lung nodule concerning for malignancy.   Pt will also be evaluated for any available clinical trials   NGS for SF3B1 mutational analysis pending    Will take liberty to arrange for [place] [facility] [place]  Follow-up for further evaluation of left lung  nodule  Plan EPO testing     Greater than 35 minutes spent during this visit of which greater than 50% devoted to counseling and coordination of care regarding diagnosis and management plan.

## 2018-03-21 ENCOUNTER — HOSPITAL ENCOUNTER (OUTPATIENT)
Dept: RADIOLOGY | Facility: HOSPITAL | Age: 68
Discharge: HOME | End: 2018-03-21
Attending: UROLOGY
Payer: MEDICARE

## 2018-03-21 ENCOUNTER — OFFICE VISIT (OUTPATIENT)
Dept: HEMATOLOGY/ONCOLOGY | Facility: CLINIC | Age: 68
End: 2018-03-21
Payer: MEDICARE

## 2018-03-21 VITALS
HEIGHT: 69 IN | HEART RATE: 85 BPM | WEIGHT: 139.75 LBS | BODY MASS INDEX: 20.7 KG/M2 | DIASTOLIC BLOOD PRESSURE: 56 MMHG | RESPIRATION RATE: 18 BRPM | TEMPERATURE: 99 F | OXYGEN SATURATION: 98 % | SYSTOLIC BLOOD PRESSURE: 99 MMHG

## 2018-03-21 DIAGNOSIS — D52.9 ANEMIA DUE TO FOLIC ACID DEFICIENCY, UNSPECIFIED DEFICIENCY TYPE: ICD-10-CM

## 2018-03-21 DIAGNOSIS — N43.3 HYDROCELE, UNSPECIFIED HYDROCELE TYPE: ICD-10-CM

## 2018-03-21 DIAGNOSIS — R91.8 MASS OF LEFT LUNG: ICD-10-CM

## 2018-03-21 DIAGNOSIS — R63.4 WEIGHT LOSS, NON-INTENTIONAL: ICD-10-CM

## 2018-03-21 DIAGNOSIS — N40.1 BPH WITH OBSTRUCTION/LOWER URINARY TRACT SYMPTOMS: ICD-10-CM

## 2018-03-21 DIAGNOSIS — N13.8 BPH WITH OBSTRUCTION/LOWER URINARY TRACT SYMPTOMS: ICD-10-CM

## 2018-03-21 DIAGNOSIS — D61.818 PANCYTOPENIA: ICD-10-CM

## 2018-03-21 DIAGNOSIS — Z87.891 FORMER SMOKER: ICD-10-CM

## 2018-03-21 DIAGNOSIS — D46.9 MDS (MYELODYSPLASTIC SYNDROME): Primary | ICD-10-CM

## 2018-03-21 PROCEDURE — 76870 US EXAM SCROTUM: CPT | Mod: TC

## 2018-03-21 PROCEDURE — 99215 OFFICE O/P EST HI 40 MIN: CPT | Mod: S$GLB,,, | Performed by: INTERNAL MEDICINE

## 2018-03-21 PROCEDURE — 3074F SYST BP LT 130 MM HG: CPT | Mod: CPTII,S$GLB,, | Performed by: INTERNAL MEDICINE

## 2018-03-21 PROCEDURE — 3078F DIAST BP <80 MM HG: CPT | Mod: CPTII,S$GLB,, | Performed by: INTERNAL MEDICINE

## 2018-03-21 PROCEDURE — 99499 UNLISTED E&M SERVICE: CPT | Mod: S$GLB,,, | Performed by: INTERNAL MEDICINE

## 2018-03-21 PROCEDURE — 76870 US EXAM SCROTUM: CPT | Mod: 26,,, | Performed by: RADIOLOGY

## 2018-03-21 PROCEDURE — 76770 US EXAM ABDO BACK WALL COMP: CPT | Mod: TC

## 2018-03-21 PROCEDURE — 99999 PR PBB SHADOW E&M-EST. PATIENT-LVL III: CPT | Mod: PBBFAC,,, | Performed by: INTERNAL MEDICINE

## 2018-03-21 PROCEDURE — 76770 US EXAM ABDO BACK WALL COMP: CPT | Mod: 26,,, | Performed by: RADIOLOGY

## 2018-03-21 NOTE — PROGRESS NOTES
CC: Pancytopenia, initial visit    Referred by:     HPI:  is a 68y/o male with AAA, diverticuliltis, hypertension, h/o smoking, who has been referred here for hematology consultation. He has been evaluated by  for pancytopenia, including severe anemia with Hb 6 g/dl . He denied any overt bleeding at the time of his initial evaluation, including melena, hematochezia or upper GI bleeding, epistaxis or hematuria. CBC on 2/7/2018 showed WBC count of  2190/mm3, Hb 8.4g/dL, HCT 24.2%, platelets of 37k. Reticulocytes were 1.4%. Iron studies showed very high serum iron saturation, but was done post transfusion. Serum B12 was normal. Serum folate was low, but RBC folate was normal. He had normal renal and liver functions. Haptoglobin was low, < 10. LDH was normal. Stool OB was negative. Zinc was low. Copper was normal. SPEP did not show any paraprotein. CHAPO was negative. HIV, HBV, HCV serologies were negative. His CBC in October 2016 showed macrocytic anemia but no leukopenia or thrombocytopenia. He had bone marrow biopsy on 3/8/18.  He had CT chest on 2/25/18 which showed a left upper lung lobe nodule.    Review of Systems   Constitutional: Positive for malaise/fatigue and weight loss. Negative for chills and fever.   HENT: Negative for nosebleeds.    Eyes: Negative for blurred vision and double vision.   Respiratory: Negative for cough, hemoptysis, shortness of breath and wheezing.    Cardiovascular: Negative for chest pain, palpitations, leg swelling and PND.   Gastrointestinal: Negative for abdominal pain, heartburn, nausea and vomiting.   Genitourinary: Negative for dysuria and urgency.   Musculoskeletal: Negative for myalgias and neck pain.   Skin: Negative for rash.   Neurological: Negative for dizziness, tingling, tremors, sensory change, weakness and headaches.   Endo/Heme/Allergies: Does not bruise/bleed easily.   Psychiatric/Behavioral: Negative for depression.       Past Medical  History:   Diagnosis Date    AAA (abdominal aortic aneurysm) 11/20/2016    Diverticulitis     HTN (hypertension)        Past Surgical History:   Procedure Laterality Date    ABDOMINAL AORTIC ANEURYSM REPAIR      APPENDECTOMY      COLOSTOMY      PLACEMENT AND REMOVAL    COLOSTOMY      diverticulitis.  removed 2 foot of colon.      Family History   Problem Relation Age of Onset    Cancer Mother     Cancer Father      Social History     Social History    Marital status: Single     Spouse name: N/A    Number of children: N/A    Years of education: N/A     Occupational History    Not on file.     Social History Main Topics    Smoking status: Former Smoker     Packs/day: 1.50     Years: 30.00     Types: Cigarettes     Quit date: 12/20/2017    Smokeless tobacco: Never Used      Comment: quit 2 months ago      Alcohol use No    Drug use: No    Sexual activity: Not Currently         Review of patient's allergies indicates:No Known Allergies    Current Outpatient Prescriptions   Medication Sig    carisoprodol (SOMA) 350 MG tablet take 1 tablet by mouth three times a day 10 days    cyanocobalamin (VITAMIN B-12) 100 MCG tablet Take 100 mcg by mouth once daily.    folic acid (FOLVITE) 800 MCG Tab Take 800 mcg by mouth once daily.    losartan-hydrochlorothiazide 50-12.5 mg (HYZAAR) 50-12.5 mg per tablet Take 1 tablet by mouth once daily.    oxyCODONE-acetaminophen (PERCOCET) 5-325 mg per tablet     zinc gluconate 50 mg tablet Take 50 mg by mouth once daily.     No current facility-administered medications for this visit.          Vitals:    03/21/18 1405   BP: (!) 99/56   Pulse: 85   Resp: 18   Temp: 98.5 °F (36.9 °C)       Physical Exam   Constitutional: He is oriented to person, place, and time. He appears well-developed. No distress.   HENT:   Head: Normocephalic and atraumatic.   Mouth/Throat: No oropharyngeal exudate.   Eyes: Pupils are equal, round, and reactive to light. No scleral icterus.   Neck:  Normal range of motion.   Cardiovascular: Normal rate, regular rhythm and normal heart sounds.    No murmur heard.  Pulmonary/Chest: Effort normal and breath sounds normal. No respiratory distress. He has no wheezes.   Abdominal: Soft. Bowel sounds are normal. He exhibits no distension. There is no tenderness. There is no rebound.   Musculoskeletal: He exhibits no edema.   Lymphadenopathy:     He has no cervical adenopathy.   Neurological: He is alert and oriented to person, place, and time. No cranial nerve deficit.   Skin: Skin is warm.   Psychiatric: He has a normal mood and affect.        2/7/18 SPEP: No paraprotein bands identified    3/8/18 bone marrow aspiration/biopsy    BONE MARROW ASPIRATE SMEARS, TOUCH IMPRINTS, CORE BIOPSY, LEFT ILIAC CREST, AND CLOT SECTION:    --Hypercellular bone marrow with megakaryocyte atypia and ring sideroblasts, see comment.  --Mildly increased reticulin fibrosis (MF-1)  --Mild polytypic plasmacytosis, 5.6%, see comment.    COMMENT: The core biopsy is hypercellular for age (50%) and features panhyperplasia. The megakaryocytes are predominantly small and hypolobated. Blasts are not increased by morphology or in the corresponding flow  cytometric analysis (please see separate report). There is mild erythroid atypia and approximately 7.5% of the erythroid precursors are ring sideroblasts. Taken together, these findings are concerning for a myelodysplastic  syndrome; however, all other non-neoplastic causes for these features must be excluded, and correlation with the corresponding cytogenetics analysis is required. Given the presence of ring sideroblasts, NGS including SF3B1  mutational analysis is being performed at [facility], and these results will be reported in a supplemental report. Additionally, there is a mild polytypic plasmacytosis, which may be seen in association with  bacterial and viral infections, autoimmune conditions, cirrhosis, and in association  with neoplastic disorders.    Cytogenetics    The result is abnormal. Of 20 metaphases, 13 metaphases were normal and 7 metaphases had an unbalanced 1;7 translocation resulting in a 1q duplication and a 7q deletion. This translocation has been associated with both de bill and therapy-related MDS and AML   These findings support a neoplastic etiology for the morphologic features seen, and this case is best classified as a myelodysplastic syndrome with single lineage dysplasia pending NGS for SF3B1 mutational analysis.    Flow cytometry      Flow cytometric analysis of  bone marrow shows populations of polyclonal B lymphocytes and T lymphocytes that are immunophenotypically unremarkable.  The plasma cells are polytypic.  The blast gate is not increased. Flow differential:  Lymphocytes 12.5%,    Monocytes 19.0%, Granulocytes  65.0%, Blast  1.3%, Debris/nRBC 2.1%,  Viability 93.3%.     Component      Latest Ref Rng & Units 3/16/2018   WBC      3.90 - 12.70 K/uL 2.68 (L)   RBC      4.60 - 6.20 M/uL 2.39 (L)   Hemoglobin      14.0 - 18.0 g/dL 7.7 (L)   Hematocrit      40.0 - 54.0 % 23.0 (L)   MCV      82 - 98 fL 96   MCH      27.0 - 31.0 pg 32.2 (H)   MCHC      32.0 - 36.0 g/dL 33.5   RDW      11.5 - 14.5 % 19.8 (H)   Platelets      150 - 350 K/uL 42 (L)   MPV      9.2 - 12.9 fL SEE COMMENT   Gran # (ANC)      1.8 - 7.7 K/uL 1.1 (L)   Lymph #      1.0 - 4.8 K/uL 1.1   Mono #      0.3 - 1.0 K/uL 0.4   Eos #      0.0 - 0.5 K/uL 0.0   Baso #      0.00 - 0.20 K/uL 0.01   Gran%      38.0 - 73.0 % 42.9   Lymph%      18.0 - 48.0 % 41.0   Mono%      4.0 - 15.0 % 14.2   Eosinophil%      0.0 - 8.0 % 1.5   Basophil%      0.0 - 1.9 % 0.4   Aniso       Slight   Ovalocytes       Occasional   Tear Drop Cells       Occasional   Differential Method       Automated     Assessment:    1. MDS: He has dysplastic changes in his marrow, with ringed sideroblasts comprising 7.5% of the RBC precursors. NGS panel pending. 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  Blasts were not increased. He was consuming significant amounts of alcohol daily, and more on weekends, at the time of this marrow biopsy. He was also noted to have low serum zinc. He was started on oral zinc, oral B12. However, even after a month of being on these supplements, his CBC has not changed significantly. His IPSS-R  Score is 4.5, putting him in the intermediate risk category. Median time to 25% AML evoluation is~3.2 years.I discussed treatment of intermediate risk MDS.   I explained that conservative/low intensity treatments with EPO analogues, GCSF, transfusions can be tried to assess response, especially if he has another malignancy and serum EPO level is < 500.  He has upcoming pulmonology evaluation next week, and will possibly need bronchoscopy/CT guided biopsy.   He has a sister and a brother, who could be potential matches if he is evaluated and found eligible for stem cell transplant( if studies are negative for pulmonary malignancy) . His HCT CI score is unclear at this time.        Plan:    1. Follow with pulmonology  2. Follow in the clinic here in 2-3 weeks, post biopsy / bronchoscopy  3. Leukoreduced, irradiated PRBC for hemoglobin < 7  4. Irradiated platelets for platelet count < 05188  5. Iron studies to r/o transfusion related iron overload and chelation if approrpaite

## 2018-03-21 NOTE — LETTER
March 21, 2018      Paula Westbrook MD  [address]  [address]  [place] [address]           [facility]  [address] [place] [id number]  Phone: [phone]          Patient: Glen Arroyo   MR Number: [id number]   YOB: 1950   Date of Visit: 3/21/2018       Dear Dr. Paula Westbrook:    Thank you for referring Glen Arroyo to me for evaluation. Attached you will find relevant portions of my assessment and plan of care.    If you have questions, please do not hesitate to call me. I look forward to following Glen Arroyo along with you.    Sincerely,    Lazara Truong MD    Enclosure  CC:  No Recipients    If you would like to receive this communication electronically, please contact [email or web address] or [phone] to request more information on [employer] access.    For providers and/or their staff who would like to refer a patient to [facility], please contact us through our one-stop-shop provider referral line, Cathy Islas, at [phone].    If you feel you have received this communication in error or would no longer like to receive these types of communications, please e-mail [email or web address]

## 2018-03-23 ENCOUNTER — HOSPITAL ENCOUNTER (OUTPATIENT)
Dept: RADIOLOGY | Facility: HOSPITAL | Age: 68
Discharge: HOME | End: 2018-03-23
Attending: SURGERY
Payer: MEDICARE

## 2018-03-23 DIAGNOSIS — I71.40 AAA (ABDOMINAL AORTIC ANEURYSM) WITHOUT RUPTURE: ICD-10-CM

## 2018-03-23 PROCEDURE — 74174 CTA ABD&PLVS W/CONTRAST: CPT | Mod: TC

## 2018-03-23 PROCEDURE — 25500020 PHARM REV CODE 255: Performed by: SURGERY

## 2018-03-23 PROCEDURE — 74174 CTA ABD&PLVS W/CONTRAST: CPT | Mod: 26,,, | Performed by: RADIOLOGY

## 2018-03-23 RX ADMIN — IOHEXOL 85 ML: 350 INJECTION, SOLUTION INTRAVENOUS at 09:03

## 2018-03-26 ENCOUNTER — HOSPITAL ENCOUNTER (OUTPATIENT)
Dept: PULMONOLOGY | Facility: CLINIC | Age: 68
Discharge: HOME | End: 2018-03-26
Payer: MEDICARE

## 2018-03-26 ENCOUNTER — OFFICE VISIT (OUTPATIENT)
Dept: PULMONOLOGY | Facility: CLINIC | Age: 68
End: 2018-03-26
Payer: MEDICARE

## 2018-03-26 VITALS
SYSTOLIC BLOOD PRESSURE: 110 MMHG | BODY MASS INDEX: 20.9 KG/M2 | OXYGEN SATURATION: 98 % | DIASTOLIC BLOOD PRESSURE: 68 MMHG | WEIGHT: 141.13 LBS | HEIGHT: 69 IN | HEART RATE: 88 BPM

## 2018-03-26 DIAGNOSIS — J43.9 PULMONARY EMPHYSEMA, UNSPECIFIED EMPHYSEMA TYPE: ICD-10-CM

## 2018-03-26 DIAGNOSIS — Z87.891 FORMER SMOKER: ICD-10-CM

## 2018-03-26 DIAGNOSIS — R91.1 LUNG NODULE: Primary | ICD-10-CM

## 2018-03-26 DIAGNOSIS — R91.1 LUNG NODULE: ICD-10-CM

## 2018-03-26 DIAGNOSIS — D46.9 MDS (MYELODYSPLASTIC SYNDROME): ICD-10-CM

## 2018-03-26 DIAGNOSIS — D61.818 PANCYTOPENIA: ICD-10-CM

## 2018-03-26 DIAGNOSIS — I70.0 AORTIC ATHEROSCLEROSIS: ICD-10-CM

## 2018-03-26 LAB
POST FEV1 FVC: 0.71
POST FEV1: 3.06
POST FVC: 4.33
PRE FEV1 FVC: 69
PRE FEV1: 2.94
PRE FVC: 4.27
PREDICTED FEV1 FVC: 79
PREDICTED FEV1: 3.14
PREDICTED FVC: 3.94

## 2018-03-26 PROCEDURE — 3074F SYST BP LT 130 MM HG: CPT | Mod: CPTII,S$GLB,, | Performed by: INTERNAL MEDICINE

## 2018-03-26 PROCEDURE — 99999 PR PBB SHADOW E&M-EST. PATIENT-LVL III: CPT | Mod: PBBFAC,,, | Performed by: INTERNAL MEDICINE

## 2018-03-26 PROCEDURE — 94060 EVALUATION OF WHEEZING: CPT | Mod: S$GLB,,, | Performed by: INTERNAL MEDICINE

## 2018-03-26 PROCEDURE — 94729 DIFFUSING CAPACITY: CPT | Mod: S$GLB,,, | Performed by: INTERNAL MEDICINE

## 2018-03-26 PROCEDURE — 99204 OFFICE O/P NEW MOD 45 MIN: CPT | Mod: 25,S$GLB,, | Performed by: INTERNAL MEDICINE

## 2018-03-26 PROCEDURE — 3078F DIAST BP <80 MM HG: CPT | Mod: CPTII,S$GLB,, | Performed by: INTERNAL MEDICINE

## 2018-03-26 PROCEDURE — 99499 UNLISTED E&M SERVICE: CPT | Mod: S$GLB,,, | Performed by: INTERNAL MEDICINE

## 2018-03-26 NOTE — PROGRESS NOTES
"Subjective:       Patient ID: Glen Arroyo is a 67 y.o. male.    Chief Complaint: Pulmonary Nodules    67 year old who went to ED with head and back pain and found an incidental pulmonary nodule.  MDS was also found incidentally when he was found to have asymptomatic anemia.  Quit smoking in December.  Fatigue.  Retired from the [employer], worked on [employer].        Review of Systems   Constitutional: Positive for weight loss and fatigue. Negative for fever, weight gain and appetite change.   HENT: Negative for postnasal drip, rhinorrhea and trouble swallowing.    Respiratory: Negative for hemoptysis and shortness of breath.    Cardiovascular: Negative for chest pain and leg swelling.   Genitourinary: Negative for difficulty urinating.   Endocrine: Negative for cold intolerance and heat intolerance.    Musculoskeletal: Positive for arthralgias. Negative for back pain.        Neck pain   Gastrointestinal: Negative for acid reflux.   Neurological: Negative for headaches.   Hematological: Negative for adenopathy.   Psychiatric/Behavioral: Negative for confusion.       Past medical and surgical history reviewed.  Social and family history reviewed.  Allergies and medications reviewed.  Objective:       Vitals:    03/26/18 0834   BP: 110/68   BP Location: Right arm   Patient Position: Sitting   Pulse: 88   SpO2: 98%   Weight: 64 kg (141 lb 1.5 oz)   Height: 5' 9" (1.753 m)     Physical Exam   Constitutional: He is oriented to person, place, and time. He appears well-developed and well-nourished. No distress.   HENT:   Head: Normocephalic.   Right Ear: External ear normal.   Left Ear: External ear normal.   Nose: Nose normal.   Mouth/Throat: Oropharynx is clear and moist.   Neck: Normal range of motion. Neck supple. No tracheal deviation present. No thyromegaly present.   Cardiovascular: Normal rate, regular rhythm, normal heart sounds and intact distal pulses.    Pulmonary/Chest: Normal expansion and symmetric chest wall " expansion. He has no wheezes. He has no rales. He exhibits no tenderness.   Abdominal: Soft. Bowel sounds are normal. He exhibits no distension and no mass. There is no hepatosplenomegaly. There is no tenderness.   Musculoskeletal: Normal range of motion.   Lymphadenopathy: No supraclavicular adenopathy is present.     He has no cervical adenopathy.   Neurological: He is alert and oriented to person, place, and time. No cranial nerve deficit.   Psychiatric: He has a normal mood and affect.   Vitals reviewed.       Personal Diagnostic Review  CT of chest performed on October 2016 with contrast revealed small left upper lobe nodule that is enlarged on most previous nodule from 2/25/2018.  No flowsheet data found.      Assessment:       1. Lung nodule    2. Former smoker    3. Pulmonary emphysema, unspecified emphysema type    4. MDS (myelodysplastic syndrome)    5. Pancytopenia    6. Aortic atherosclerosis        Outpatient Encounter Prescriptions as of 3/26/2018   Medication Sig Dispense Refill    carisoprodol (SOMA) 350 MG tablet take 1 tablet by mouth three times a day 10 days  0    cyanocobalamin (VITAMIN B-12) 100 MCG tablet Take 100 mcg by mouth once daily.      folic acid (FOLVITE) 800 MCG Tab Take 800 mcg by mouth once daily.      losartan-hydrochlorothiazide 50-12.5 mg (HYZAAR) 50-12.5 mg per tablet Take 1 tablet by mouth once daily.      oxyCODONE-acetaminophen (PERCOCET) 5-325 mg per tablet       zinc gluconate 50 mg tablet Take 50 mg by mouth once daily.       No facility-administered encounter medications on file as of 3/26/2018.      Orders Placed This Encounter   Procedures    NM PET CT Routine Skull to Mid Thigh     Standing Status:   Future     Standing Expiration Date:   3/26/2019    Spirometry with/without bronchodilator     Standing Status:   Future     Standing Expiration Date:   3/26/2019    DLCO-Carbon Monoxide Diffusing Capacity     Standing Status:   Future     Standing Expiration  Date:   3/26/2019     Plan:       Enlarging pulmonary nodule in a former smoker, highly suspicious for primary lung cancer.  PET/CT as this is a difficult location to biopsy.  Spirometry and dlco to evalutate surgical candidacy  With history of pancytopenia due to MDS will need stepwise approach.  History of AAA and aortic atherosclerosis, will likely will need cardiac eval prior to surgery followed by vascular surgery for triple AAA.    Multi-D approach, will discuss    Asymptomatic from emphysema standpoint, but will get baseline PFT

## 2018-03-26 NOTE — LETTER
March 26, 2018      Paula Westbrook MD  [address]  [address]  [place] [address]           [facility] - Pulmonary Services  [facility]  [place] [address]  Phone: [phone]          Patient: Glen Arroyo   MR Number: [id number]   YOB: 1950   Date of Visit: 3/26/2018       Dear Dr. Paula Westbrook:    Thank you for referring Glen Arroyo to me for evaluation. Attached you will find relevant portions of my assessment and plan of care.    If you have questions, please do not hesitate to call me. I look forward to following Glen Arroyo along with you.    Sincerely,    Karina Hernandez MD    Enclosure  CC:  No Recipients    If you would like to receive this communication electronically, please contact [email or web address] or [phone] to request more information on [email or web address] access.    For providers and/or their staff who would like to refer a patient to [facility], please contact us through our one-stop-shop provider referral line, [facility] , at [phone].    If you feel you have received this communication in error or would no longer like to receive these types of communications, please e-mail [email or web address]

## 2018-03-27 ENCOUNTER — TELEPHONE (OUTPATIENT)
Dept: HEMATOLOGY/ONCOLOGY | Facility: CLINIC | Age: 68
End: 2018-03-27

## 2018-03-27 NOTE — TELEPHONE ENCOUNTER
----- Message from Dianna Scott sent at 3/27/2018  2:39 PM CDT -----  Contact: Pt  Pt calling asking to speak with Mariela      Pt call back number [phone]

## 2018-03-28 ENCOUNTER — TELEPHONE (OUTPATIENT)
Dept: PULMONOLOGY | Facility: CLINIC | Age: 68
End: 2018-03-28

## 2018-03-28 ENCOUNTER — DOCUMENTATION ONLY (OUTPATIENT)
Dept: CARDIOTHORACIC SURGERY | Facility: CLINIC | Age: 68
End: 2018-03-28

## 2018-03-28 ENCOUNTER — TELEPHONE (OUTPATIENT)
Dept: CARDIOTHORACIC SURGERY | Facility: CLINIC | Age: 68
End: 2018-03-28

## 2018-03-28 DIAGNOSIS — R91.1 PULMONARY NODULE: Primary | ICD-10-CM

## 2018-03-28 NOTE — TELEPHONE ENCOUNTER
Multi_D conference safest option is thoracic surgery for definitive bx/excision.  PET/CT scheduled.  Message left with patient

## 2018-03-28 NOTE — TELEPHONE ENCOUNTER
Received referral from Dr. Hernandez for pt with small left upper lobe nodule to lung.  Informed that multidisciplinary conference meet today and recommended referral to thoracic surgery.  Agreeable with appointment to meet with Dr. Machado on 4/6.  Also informed that the PET scan is not needed at this time and will be cancelled. Agreeable and appointment slip mailed.

## 2018-03-28 NOTE — TELEPHONE ENCOUNTER
Advised patient he is being referred to [facility] for biopsy. Message sent to [facility] to call patient to schedule consult.

## 2018-03-28 NOTE — TELEPHONE ENCOUNTER
----- Message from Jen Justice sent at 3/28/2018  1:50 PM CDT -----  Contact: Self [phone]  PT returned Dr. Hernandez's call.

## 2018-03-28 NOTE — PLAN OF CARE
[facility]      THORACIC MULTIDISCIPLINARY TUMOR BOARD  PATIENT REVIEW FORM  ________________________________________________________________________    CLINIC #: [id number]  DATE: 03/28/2018    Diagnosis:   Pulmonary Nodule, MDS    PRESENTER:   Dr. Hernandez    PATIENT SUMMARY:   66y/o male former smoker with AAA s/p PTA and stenting of bilateral iliacs, diverticuliltis, HTN, tobacco abuse, ETOH, and newly diagnosed MDS presenting for incidentally found 2.0 x 0.8cm spiculated nodule in DEIRDRE apex. Nodule likely dating back to 2016 with an increasing solid component. Recent hospital admission for asymptomatic anemia and now follows with hematology for MDS. Recommendations: leukoreduced, irradiated PRBC for hemoglobin < 7, Irradiated platelets for platelet count < 57769. PFTS:  FEV1 2.94L 94%, DLCO 11.10 64%    BOARD RECOMMENDATIONS:   Lesion likely not amenable to IR biopsy. Given the increase in solid component and smoking history, recommend referral to Thoracic Surgery for evaluation for a nonanatomic lung resection of suspicious lesion.     CONSULT NEEDED:     [x] Surgery    [] Hem/Onc    [] Rad/Onc    [] Dietary                 [] Social Service    [] Psychology       [] Pulmonology    Clinical Stage: Tumor  Node(s)  Metastasis   Pathologic Stage: Tumor  Node(s)  Metastasis       GROUP STAGE:     [] O    [] 1A    [] IB    [] IIA    [] IIB     [] IIIA     [] IIIB     [] IIIC    [] IV                               [] Local recurrence     [] Regional recurrence     [] Distant recurrence                   [] NSCLC     [] SCLC     Tumor type     Unstageable:      [] Yes     [] No  Metastatic site(s):          [x] [place] Treatment Guidelines reviewed and care planned is consistent with guidelines.         (i.e., NCCN, NCI, PD, ACO, AUA, etc.)    PRESENTATION AT CANCER CONFERENCE:         [] Prospective    [] Retrospective     [] Follow-Up          [] Eligible for clinical trial

## 2018-04-06 ENCOUNTER — OFFICE VISIT (OUTPATIENT)
Dept: CARDIOTHORACIC SURGERY | Facility: CLINIC | Age: 68
End: 2018-04-06
Payer: MEDICARE

## 2018-04-06 VITALS
DIASTOLIC BLOOD PRESSURE: 60 MMHG | SYSTOLIC BLOOD PRESSURE: 94 MMHG | WEIGHT: 139.31 LBS | HEART RATE: 107 BPM | BODY MASS INDEX: 20.63 KG/M2 | HEIGHT: 69 IN | OXYGEN SATURATION: 99 %

## 2018-04-06 DIAGNOSIS — D69.6 THROMBOCYTOPENIA: ICD-10-CM

## 2018-04-06 DIAGNOSIS — Z01.818 PREOP TESTING: Primary | ICD-10-CM

## 2018-04-06 DIAGNOSIS — R91.1 PULMONARY NODULE, LEFT: ICD-10-CM

## 2018-04-06 PROCEDURE — 99999 PR PBB SHADOW E&M-EST. PATIENT-LVL IV: CPT | Mod: PBBFAC,,, | Performed by: THORACIC SURGERY (CARDIOTHORACIC VASCULAR SURGERY)

## 2018-04-06 PROCEDURE — 99499 UNLISTED E&M SERVICE: CPT | Mod: S$GLB,,, | Performed by: THORACIC SURGERY (CARDIOTHORACIC VASCULAR SURGERY)

## 2018-04-06 PROCEDURE — 3074F SYST BP LT 130 MM HG: CPT | Mod: CPTII,S$GLB,, | Performed by: THORACIC SURGERY (CARDIOTHORACIC VASCULAR SURGERY)

## 2018-04-06 PROCEDURE — 99204 OFFICE O/P NEW MOD 45 MIN: CPT | Mod: S$GLB,,, | Performed by: THORACIC SURGERY (CARDIOTHORACIC VASCULAR SURGERY)

## 2018-04-06 PROCEDURE — 3078F DIAST BP <80 MM HG: CPT | Mod: CPTII,S$GLB,, | Performed by: THORACIC SURGERY (CARDIOTHORACIC VASCULAR SURGERY)

## 2018-04-06 NOTE — LETTER
[place] - Thoracic Surgery  [address]  [place] [address]  Phone: [phone]  Fax: [phone] April 6, 2018      Karina Hernandez MD  [address]  [place] [address]    Patient: Glen Arroyo   MR Number: [id number]   YOB: 1950   Date of Visit: 4/6/2018     Dear Dr. Hernandez:    Thank you for referring Glen Arroyo to me for evaluation. He presents with a new diagnosis of myelodysplastic syndrome (MDS) and a left upper lobe lung nodule.  The lung nodule needs to be biopsied before instituting definitive therapy for the MDS.    I recommend a left VATS upper lobe wedge resection vs non anatomic segmentectomy.  I prefer this as the fastest and least invasive means to minimize postoperative convalescence.  This approach should allow for relatively quick administration of medical therapy for the MDS.     If you have questions, please do not hesitate to call me. I look forward to following Glen along with you.    Sincerely,      Clint Machado MD   Section of Thoracic Surgery  [facility] [place] [place]    BLP/hcr    CC  José Marquez MD

## 2018-04-09 ENCOUNTER — HOSPITAL ENCOUNTER (OUTPATIENT)
Dept: CARDIOLOGY | Facility: HOSPITAL | Age: 68
Discharge: HOME | End: 2018-04-09
Attending: THORACIC SURGERY (CARDIOTHORACIC VASCULAR SURGERY)
Payer: MEDICARE

## 2018-04-09 DIAGNOSIS — Z01.818 PREOP TESTING: ICD-10-CM

## 2018-04-09 DIAGNOSIS — I71.40 AAA (ABDOMINAL AORTIC ANEURYSM) WITHOUT RUPTURE: Primary | ICD-10-CM

## 2018-04-09 DIAGNOSIS — I10 ESSENTIAL HYPERTENSION: ICD-10-CM

## 2018-04-09 DIAGNOSIS — D46.9 MDS (MYELODYSPLASTIC SYNDROME): ICD-10-CM

## 2018-04-09 LAB
ANNOTATION COMMENT IMP: NORMAL
NGS CLINCIAL TRIALS: NORMAL
NGS INDICATION OF TEST: NORMAL
NGS INTERPRETATION: NORMAL
NGS ONCOHEME PANEL GENE LIST: NORMAL
NGS PATHOGENIC MUTATIONS DETECTED: NORMAL
NGS REVIEWED BY:: NORMAL
NGS VARIANTS OF UNKNOWN SIGNIFICANCE: NORMAL
REF LAB TEST METHOD: NORMAL
SPECIMEN SOURCE: NORMAL
TEST PERFORMANCE INFO SPEC: NORMAL

## 2018-04-09 PROCEDURE — 93325 DOPPLER ECHO COLOR FLOW MAPG: CPT | Mod: 26,,, | Performed by: INTERNAL MEDICINE

## 2018-04-09 PROCEDURE — 93325 DOPPLER ECHO COLOR FLOW MAPG: CPT

## 2018-04-09 PROCEDURE — 63600175 PHARM REV CODE 636 W HCPCS

## 2018-04-09 PROCEDURE — 93320 DOPPLER ECHO COMPLETE: CPT | Mod: 26,,, | Performed by: INTERNAL MEDICINE

## 2018-04-09 PROCEDURE — 93351 STRESS TTE COMPLETE: CPT | Mod: 26,,, | Performed by: INTERNAL MEDICINE

## 2018-04-09 RX ORDER — ATROPINE SULFATE 0.1 MG/ML
INJECTION INTRAVENOUS
Status: DISCONTINUED
Start: 2018-04-09 | End: 2018-04-09 | Stop reason: WASHOUT

## 2018-04-09 RX ORDER — DOBUTAMINE HYDROCHLORIDE 200 MG/100ML
INJECTION INTRAVENOUS
Status: DISPENSED
Start: 2018-04-09 | End: 2018-04-09

## 2018-04-10 LAB
DIASTOLIC DYSFUNCTION: NO
ESTIMATED PA SYSTOLIC PRESSURE: 28.81
GLOBAL PERICARDIAL EFFUSION: NORMAL
MITRAL VALVE REGURGITATION: NORMAL
RETIRED EF AND QEF - SEE NOTES: 55 (ref 55–65)
TRICUSPID VALVE REGURGITATION: NORMAL

## 2018-04-12 ENCOUNTER — ANESTHESIA EVENT (OUTPATIENT)
Dept: SURGERY | Facility: HOSPITAL | Age: 68
DRG: 164 | End: 2018-04-12
Payer: MEDICARE

## 2018-04-12 ENCOUNTER — HOSPITAL ENCOUNTER (OUTPATIENT)
Dept: RADIOLOGY | Facility: HOSPITAL | Age: 68
Discharge: HOME | End: 2018-04-12
Attending: INTERNAL MEDICINE
Payer: MEDICARE

## 2018-04-12 DIAGNOSIS — R91.1 LUNG NODULE: ICD-10-CM

## 2018-04-12 DIAGNOSIS — Z87.891 FORMER SMOKER: ICD-10-CM

## 2018-04-12 PROCEDURE — 78815 PET IMAGE W/CT SKULL-THIGH: CPT | Mod: TC

## 2018-04-12 PROCEDURE — 78815 PET IMAGE W/CT SKULL-THIGH: CPT | Mod: 26,PI,, | Performed by: RADIOLOGY

## 2018-04-12 PROCEDURE — A9552 F18 FDG: HCPCS

## 2018-04-12 NOTE — PRE ADMISSION SCREENING
Anesthesia Assessment: Preoperative EQUATION    Planned Procedure: Procedure(s) (LRB):  THORACOSCOPY-VIDEO ASSISTED (VATS) W/WEDGE LUNG RESECTION (Left)  DISSECTION-LYMPH NODE (Left)  Requested Anesthesia Type:General  Surgeon: Clint Machado MD  Service: Thoracic  Known or anticipated Date of Surgery:4/17/2018    Surgeon notes: reviewed and Pulmonary nodule  Previous anesthesia records:3/8/57-Cpnzfm-Gpop Marrow(left)-General-no apparent anesthetic complications and tolerated procedure well  Cardiovascular:   Exercise tolerance: poor Hypertension, well controlled    Last PCP note: within 1 month , outside [facility] , focused visit   Subspecialty notes: Hematology/Oncology, Pulmonary, Vascular Surgery, Urology/  Tests already available:  Results have been reviewed.Labs-3/23/18-BMP/CBC/ 2/7/18-CBC/BMP/Folate/Vitamin B/Haptoglobin/EKG/10/18/16/CXR-10/17/16      Plan:    Testing:  Hematology Profile, T&S and ordered by surgeon   Pre-anesthesia  visit       Visit focus: airway concerns     Consultation:IM Perioperative Hospitalist     Patient  has previously scheduled Medical Appointment:Apppintments on 4/16/18, prior to surgery date.    Navigation: Tests Scheduled. Labs-T&S/Hem Profile on 4/16/18 @ 12:45p(per surgeons' office)             Consults scheduled.POC & Perioperative IM Consult on 4/16/18 @ 1p & 3p             Results will be tracked by Preop Clinic.               Shyanne Rowe RN  4/12/18

## 2018-04-13 ENCOUNTER — TELEPHONE (OUTPATIENT)
Dept: CARDIOTHORACIC SURGERY | Facility: CLINIC | Age: 68
End: 2018-04-13

## 2018-04-13 NOTE — TELEPHONE ENCOUNTER
----- Message from Teri Blakely sent at 4/13/2018  9:53 AM CDT -----  Contact: Pt.Self   Pt. States he is returning missed call please call Pt. Back @ [phone] Thank You :)

## 2018-04-16 ENCOUNTER — HOSPITAL ENCOUNTER (OUTPATIENT)
Dept: PREADMISSION TESTING | Facility: HOSPITAL | Age: 68
Discharge: HOME | DRG: 164 | End: 2018-04-16
Attending: ANESTHESIOLOGY
Payer: MEDICARE

## 2018-04-16 ENCOUNTER — INITIAL CONSULT (OUTPATIENT)
Dept: INTERNAL MEDICINE | Facility: CLINIC | Age: 68
DRG: 164 | End: 2018-04-16
Payer: MEDICARE

## 2018-04-16 VITALS
HEART RATE: 102 BPM | OXYGEN SATURATION: 98 % | SYSTOLIC BLOOD PRESSURE: 90 MMHG | TEMPERATURE: 98 F | WEIGHT: 134.5 LBS | BODY MASS INDEX: 19.92 KG/M2 | HEIGHT: 69 IN | DIASTOLIC BLOOD PRESSURE: 56 MMHG

## 2018-04-16 DIAGNOSIS — J43.9 PULMONARY EMPHYSEMA, UNSPECIFIED EMPHYSEMA TYPE: ICD-10-CM

## 2018-04-16 DIAGNOSIS — R91.1 PULMONARY NODULE, LEFT: ICD-10-CM

## 2018-04-16 DIAGNOSIS — I10 ESSENTIAL HYPERTENSION: ICD-10-CM

## 2018-04-16 DIAGNOSIS — I71.40 AAA (ABDOMINAL AORTIC ANEURYSM) WITHOUT RUPTURE: ICD-10-CM

## 2018-04-16 DIAGNOSIS — Z01.818 PREOP EXAMINATION: Primary | ICD-10-CM

## 2018-04-16 DIAGNOSIS — M25.562 LEFT KNEE PAIN, UNSPECIFIED CHRONICITY: ICD-10-CM

## 2018-04-16 DIAGNOSIS — D46.9 MDS (MYELODYSPLASTIC SYNDROME): ICD-10-CM

## 2018-04-16 PROCEDURE — 99999 PR PBB SHADOW E&M-EST. PATIENT-LVL III: CPT | Mod: PBBFAC,,, | Performed by: HOSPITALIST

## 2018-04-16 PROCEDURE — 3078F DIAST BP <80 MM HG: CPT | Mod: CPTII,S$GLB,, | Performed by: HOSPITALIST

## 2018-04-16 PROCEDURE — 99214 OFFICE O/P EST MOD 30 MIN: CPT | Mod: S$GLB,,, | Performed by: HOSPITALIST

## 2018-04-16 PROCEDURE — 3074F SYST BP LT 130 MM HG: CPT | Mod: CPTII,S$GLB,, | Performed by: HOSPITALIST

## 2018-04-16 RX ORDER — ACETAMINOPHEN 500 MG
500 TABLET ORAL EVERY 6 HOURS PRN
COMMUNITY
End: 2018-08-29

## 2018-04-16 NOTE — OUTPATIENT SUBJECTIVE & OBJECTIVE
Outpatient Subjective & Objective     Chief complaint-Preoperative evaluation, Perioperative Medical management, complication reduction plan     Active cardiac conditions- none    Revised cardiac risk index predictors- high-risk type of surgery    Functional capacity -Examples of physical activity,less active lately , Until [facility] little over 1 year ago and was very active , took flight of stairs , used to cut grass, lately having Left knee pain , limiting activity ,   can take a flight of stairs holding on to the railing----- He can undertake all the above activities without  chest pain,chest tightness, Shortness of breath ,dizziness,lightheadedness making his exercise tolerance more,   than 4 Mets.       Review of Systems   Constitutional: Negative for chills and fever.   HENT:        STOPBANG score  4/ 8     Elevated BP  Age over 50 years  Neck size over 40 CM  Male gender    Occasional Left nostril , mild blood on blowing in the  Nose    Eyes:        No unusual vision changes   Respiratory:        No cough , phlegm  No Hemoptysis  Occasional clear phlegm   Cardiovascular:        As noted   Gastrointestinal:        Bowels- Regular   No overt GI/ blood losses   Endocrine:        Prednisone use > 20 mg daily for 3 weeks- none   Genitourinary: Negative for dysuria.        No urinary hesitancy    Musculoskeletal:        As above   Neck pain- 1 month on and off   Skin: Negative for rash.   Neurological: Negative for syncope.        No unilateral weakness   Hematological:        Current use of Anticoagulants  Antiplatelet agents  none   Psychiatric/Behavioral:        Looking forward for surgery   No SI/HI       No past medical history pertinent negatives.    Past Surgical History:   Procedure Laterality Date    ABDOMINAL AORTIC ANEURYSM REPAIR      APPENDECTOMY      COLOSTOMY      PLACEMENT AND REMOVAL    COLOSTOMY      diverticulitis.  removed 2 foot of colon.        No anesthesia, bleeding, cardiac  "problems, PONV with previous surgeries/procedures.  Medications and Allergies reviewed in epic.   Lives with partner   Help available post op  FH- No anesthesia, thrombosis/ bleeding  , early onset heart disease in family     Physical Exam   HENT:   Head: Normocephalic.       Physical Exam   HENT:   Head: Normocephalic.     Constitutional- Vitals - Body mass index is 19.86 kg/m².,   Vitals:    04/16/18 1053   BP: (!) 80/55   Pulse: 102   Temp: 97.7 °F (36.5 °C)     General appearance-Conscious,Coherent  Eyes- No conjunctival icterus,pupils  round  and reactive to light   ENT-Oral cavity- moist ,  upper denture and lower partial denture  , Hearing grossly normal   Neck- No thyromegaly ,Trachea -central, No jugular venous distension,   No Carotid Bruit   Cardiovascular -Heart Sounds- Normal  and  no murmur   , No gallop rhythm   Respiratory - Normal Respiratory Effort, Normal breath sounds, crepitations bases,  no wheeze  and  no forced expiratory wheeze    Peripheral pitting pedal edema-- none , no calf pain   Gastrointestinal -Soft abdomen, No palpable masses, Non Tender,Liver,Spleen not palpable. No-- free fluid and shifting dullness  Musculoskeletal- No finger Clubbing. Strength grossly normal   Lymphatic-No Palpable cervical, axillary,Inguinal lymphadenopathy   Psychiatric - normal effect,Orientation  Rt Dorsalis pedis pulses-palpable    Lt Dorsalis pedis pulses- palpable   Rt Posterior tibial pulses -palpable   Left posterior tibial pulses -palpable   Miscellaneous -  Surgical scarabdomen mid line RLQ ,  no renal bruit, no aortic bruit, palpable abdominal aorta and  bowel sounds positive   Blood pressure (!) 80/55, pulse 102, temperature 97.7 °F (36.5 °C), temperature source Oral, height 5' 9" (1.753 m), weight 61 kg (134 lb 8 oz), SpO2 98 %.       Investigations  Lab and Imaging have been reviewed in [facility].    Review of Medicine tests    EKG- I had independently reviewed the EKG from--10/18/2016   It was " reported to be showing     Normal sinus rhythm  Normal ECG  No previous ECGs available    4/9/2018     1 - Normal left ventricular systolic function (EF 55-60%).     2 - Concentric remodeling.     No evidence of stress induced myocardial ischemia  2009     No hemodynamically significant carotid  stenosis       Review of clinical lab tests:  Lab Results   Component Value Date    CREATININE 0.8 03/23/2018    HGB 7.7 (L) 03/16/2018    PLT 42 (L) 03/16/2018           Review of old records- Was done and information gathered regards to events leading to surgery and health conditions of significance in the perioperative period.    Outpatient Subjective & Objective

## 2018-04-16 NOTE — ASSESSMENT & PLAN NOTE
I suggest holding -Losartan- HCTZ- on the morning of the surgery and can continue that  post operatively under blood pressure, electrolyte and renal function monitoring as long as they are acceptable.I suggest addressing pain control as uncontrolled pain can increased blood pressure   Suggested hydration that he usually does not   BP has been low since loosing weight   May need either need reduction of BP medication or holding   Follow up on B P and home BP monitoring suggested

## 2018-04-16 NOTE — HPI
History of present illness- I had the pleasure of meeting this pleasant 67 y.o. gentleman in the pre op clinic prior to his elective Chest  surgery. The patient is new to me . Glen was accompanied by long term partner Arlen .    I have obtained the history by speaking to the patient and by reviewing the electronic health records.    Events leading up to surgery / History of presenting illness -    No pain from this .No aggravating factors. No relieving factors     Relevant health conditions of significance for the perioperative period/ History of presenting illness -    Patient Active Problem List    Diagnosis Date Noted    Pulmonary nodule, left 04/16/2018     incidentally found 2.0 x 0.8cm spiculated nodule in DEIRDRE apex.      Left knee pain 04/16/2018     Started March 30 th 2018   After after prolonged sitting   Has arthritis of the Neck   PET scan showed   Moderate degenerative change involving the osseous structures         Pulmonary emphysema 03/26/2018     No recent problem   Quit Tobacco smoking Dec 19 th 2018       Aortic atherosclerosis 03/26/2018    Weight loss, non-intentional 03/21/2018    MDS (myelodysplastic syndrome) 03/21/2018     Had Hematology evaluation      Pancytopenia 02/07/2018    Folate deficiency anemia 02/07/2018     Folate 3.2 on Feb 7, 2017.      Recent Labs  Lab 02/06/18  1440 02/06/18  1507 02/07/18  0523   WBC 2.54* 2.33* 2.19*   HGB 6.0* 6.1* 8.4*   HCT 17.4* 17.2* 24.2*   PLT 43* 41* 37*           Symptomatic anemia 02/06/2018    Former smoker 03/24/2017    Essential hypertension 03/24/2017     Losartan- HCTZ  Usual BP readings  Decreaed BP trend since Feb 2018   Lost about 13 pounds since Early feb 2018   Concerned about health       AAA (abdominal aortic aneurysm) without rupture 10/18/2016     7 cm AAA s/p percutaneous percutaneous intervention;

## 2018-04-16 NOTE — ASSESSMENT & PLAN NOTE
Plan for leukocyte-poor platelets  administration  at the time of surgery.    3/16/2018- Hb 7.7, PLT- 42  Plan for lab today

## 2018-04-16 NOTE — PROGRESS NOTES
[facility] - Pre Op Consult  Progress Note    Patient Name: Glen Arroyo  MRN: [id number]  Date of Evaluation- 04/16/2018  PCP- José Marquez MD    Future cases for Glen Arroyo [[id number]]     Case ID Status Date Time Zeeshan Procedure Provider Location    [id number] [facility] 4/17/2018  7:00  THORACOSCOPY-VIDEO ASSISTED (VATS) W/WEDGE LUNG RESECTION Clint Machado MD [[id number]] [facility] OR 2ND FLR          HPI:  History of present illness- I had the pleasure of meeting this pleasant 67 y.o. gentleman in the pre op clinic prior to his elective Chest  surgery. The patient is new to me . Glen was accompanied by long term partner Arlen .    I have obtained the history by speaking to the patient and by reviewing the electronic health records.    Events leading up to surgery / History of presenting illness -    No pain from this .No aggravating factors. No relieving factors     Relevant health conditions of significance for the perioperative period/ History of presenting illness -    Patient Active Problem List    Diagnosis Date Noted    Pulmonary nodule, left 04/16/2018     incidentally found 2.0 x 0.8cm spiculated nodule in DEIRDRE apex.      Left knee pain 04/16/2018     Started March 30 th 2018   After after prolonged sitting   Has arthritis of the Neck   PET scan showed   Moderate degenerative change involving the osseous structures         Pulmonary emphysema 03/26/2018     No recent problem   Quit Tobacco smoking Dec 19 th 2018       Aortic atherosclerosis 03/26/2018    Weight loss, non-intentional 03/21/2018    MDS (myelodysplastic syndrome) 03/21/2018     Had Hematology evaluation      Pancytopenia 02/07/2018    Folate deficiency anemia 02/07/2018     Folate 3.2 on Feb 7, 2017.      Recent Labs  Lab 02/06/18  1440 02/06/18  1507 02/07/18  0523   WBC 2.54* 2.33* 2.19*   HGB 6.0* 6.1* 8.4*   HCT 17.4* 17.2* 24.2*   PLT 43* 41* 37*           Symptomatic anemia 02/06/2018    Former smoker 03/24/2017     Essential hypertension 03/24/2017     Losartan- HCTZ  Usual BP readings  Decreaed BP trend since Feb 2018   Lost about 13 pounds since Early feb 2018   Concerned about health       AAA (abdominal aortic aneurysm) without rupture 10/18/2016     7 cm AAA s/p percutaneous percutaneous intervention;           Subjective/ Objective:          Chief complaint-Preoperative evaluation, Perioperative Medical management, complication reduction plan     Active cardiac conditions- none    Revised cardiac risk index predictors- high-risk type of surgery    Functional capacity -Examples of physical activity,less active lately , Until skilled nursing little over 1 year ago and was very active , took flight of stairs , used to cut grass, lately having Left knee pain , limiting activity ,   can take a flight of stairs holding on to the railing----- He can undertake all the above activities without  chest pain,chest tightness, Shortness of breath ,dizziness,lightheadedness making his exercise tolerance more,   than 4 Mets.       Review of Systems   Constitutional: Negative for chills and fever.   HENT:        STOPBANG score  4/ 8     Elevated BP  Age over 50 years  Neck size over 40 CM  Male gender    Occasional Left nostril , mild blood on blowing in the  Nose    Eyes:        No unusual vision changes   Respiratory:        No cough , phlegm  No Hemoptysis  Occasional clear phlegm   Cardiovascular:        As noted   Gastrointestinal:        Bowels- Regular   No overt GI/ blood losses   Endocrine:        Prednisone use > 20 mg daily for 3 weeks- none   Genitourinary: Negative for dysuria.        No urinary hesitancy    Musculoskeletal:        As above   Neck pain- 1 month on and off   Skin: Negative for rash.   Neurological: Negative for syncope.        No unilateral weakness   Hematological:        Current use of Anticoagulants  Antiplatelet agents  none   Psychiatric/Behavioral:        Looking forward for surgery   No SI/HI       No  past medical history pertinent negatives.    Past Surgical History:   Procedure Laterality Date    ABDOMINAL AORTIC ANEURYSM REPAIR      APPENDECTOMY      COLOSTOMY      PLACEMENT AND REMOVAL    COLOSTOMY      diverticulitis.  removed 2 foot of colon.        No anesthesia, bleeding, cardiac problems, PONV with previous surgeries/procedures.  Medications and Allergies reviewed in epic.   Lives with partner   Help available post op  FH- No anesthesia, thrombosis/ bleeding  , early onset heart disease in family     Physical Exam   HENT:   Head: Normocephalic.       Physical Exam   HENT:   Head: Normocephalic.     Constitutional- Vitals - Body mass index is 19.86 kg/m².,   Vitals:    04/16/18 1053   BP: (!) 80/55   Pulse: 102   Temp: 97.7 °F (36.5 °C)     General appearance-Conscious,Coherent  Eyes- No conjunctival icterus,pupils  round  and reactive to light   ENT-Oral cavity- moist ,  upper denture and lower partial denture  , Hearing grossly normal   Neck- No thyromegaly ,Trachea -central, No jugular venous distension,   No Carotid Bruit   Cardiovascular -Heart Sounds- Normal  and  no murmur   , No gallop rhythm   Respiratory - Normal Respiratory Effort, Normal breath sounds, crepitations bases,  no wheeze  and  no forced expiratory wheeze    Peripheral pitting pedal edema-- none , no calf pain   Gastrointestinal -Soft abdomen, No palpable masses, Non Tender,Liver,Spleen not palpable. No-- free fluid and shifting dullness  Musculoskeletal- No finger Clubbing. Strength grossly normal   Lymphatic-No Palpable cervical, axillary,Inguinal lymphadenopathy   Psychiatric - normal effect,Orientation  Rt Dorsalis pedis pulses-palpable    Lt Dorsalis pedis pulses- palpable   Rt Posterior tibial pulses -palpable   Left posterior tibial pulses -palpable   Miscellaneous -  Surgical scarabdomen mid line RLQ ,  no renal bruit, no aortic bruit, palpable abdominal aorta and  bowel sounds positive   Blood pressure (!) 80/55, pulse  "102, temperature 97.7 °F (36.5 °C), temperature source Oral, height 5' 9" (1.753 m), weight 61 kg (134 lb 8 oz), SpO2 98 %.       Investigations  Lab and Imaging have been reviewed in [facility].    Review of Medicine tests    EKG- I had independently reviewed the EKG from--10/18/2016   It was reported to be showing     Normal sinus rhythm  Normal ECG  No previous ECGs available    4/9/2018     1 - Normal left ventricular systolic function (EF 55-60%).     2 - Concentric remodeling.     No evidence of stress induced myocardial ischemia  2009     No hemodynamically significant carotid  stenosis       Review of clinical lab tests:  Lab Results   Component Value Date    CREATININE 0.8 03/23/2018    HGB 7.7 (L) 03/16/2018    PLT 42 (L) 03/16/2018           Review of old records- Was done and information gathered regards to events leading to surgery and health conditions of significance in the perioperative period.        Preoperative cardiac risk assessment-  The patient does not have any active cardiac conditions . Revised cardiac risk index predictors- 1---.Functional capacity is more than 4 Mets. He will be undergoing a chest  procedure that carries a high risk     The estimated risk of the rate of adverse cardiac outcomes  0.9%    No further cardiac work up is indicated prior to proceeding with the surgery          American Society of Anesthesiologists Physical status classification ( ASA ) class: 3     Postoperative pulmonary complication risk assessment:      ARISCAT ( Canet) risk index- risk class -  Intermediate , if duration of surgery is under 2 hours,higher, if duration of surgery is over 3 hours      [facility]- percentage risk of respiratory failure: 10.1 %    Assessment/Plan:     MDS (myelodysplastic syndrome)  Plan for leukocyte-poor platelets  administration  at the time of surgery.    3/16/2018- Hb 7.7, PLT- 42  Plan for lab today     Pulmonary nodule, left  For surgery    Pulmonary " emphysema  3/26/2018   PFT- Normal spirometry. Airflow not improved after bronchodilator. Diffusion capacity is mildly decreased     Essential hypertension   I suggest holding -Losartan- HCTZ- on the morning of the surgery and can continue that  post operatively under blood pressure, electrolyte and renal function monitoring as long as they are acceptable.I suggest addressing pain control as uncontrolled pain can increased blood pressure   Suggested hydration that he usually does not   BP has been low since loosing weight   May need either need reduction of BP medication or holding   Follow up on B P and home BP monitoring suggested     AAA (abdominal aortic aneurysm) without rupture  Doing well from that stand point     Left knee pain  Discussed about further evaluation   Surgery scheduled for tomorrow  Suggested follow up post op        Preventive perioperative care    Thromboembolic prophylaxis:  His risk factors for thrombosis include surgical procedure and age.I suggest  thromboembolic prophylaxis ( mechanical/pharmacological, weighing the risk benefits of pharmacological agent use considering bruna procedural bleeding )  during the perioperative period.I suggested being active in the post operative period.      Postoperative pulmonary complication prophylaxis-Risk factors for post operative pulmonary complications include age over 65 years, ASA class >2, COPD and proximity of the surgical site to the lungs- I suggest incentive spirometry use, early ambulation and pain control so as to avoid diaphragmatic splinting  , oral care , head end of bed elevation     Renal complication prophylaxis-Risk factors for renal complications include hypertension . I suggest keeping him well hydrated .I suggested drinking 2 litre's of water a day      Surgical site Infection Prophylaxis-I  suggest appropriate antibiotic for Prophylaxis against Surgical site infections    This visit was focused on Preoperative evaluation,  "Perioperative Medical management, complication reduction plans. I suggest that the patient follows up with primary care or relevant sub specialists for ongoing health care.    I appreciate the opportunity to be involved in this patients care. Please feel free to contact me if there were any questions about this consultation.    Patient is optimized     Patient  was instructed to call and update me about any changes to health, changes to medication, office visits ,testing out side of the [facility] operative care center , hospitalizations between now and surgery     María Anglin MD  Perioperative Medicine  [facility]   Pager [phone]  ------    4/16- 16 23     CBC from today showed a Hb of 9.6, platelet count of 52   BP (!) 90/56 Comment: lt and 92/52 rt  Pulse 102   Temp 97.7 °F (36.5 °C) (Oral)   Ht 5' 9" (1.753 m)   Wt 61 kg (134 lb 8 oz)   SpO2 98%   BMI 19.86 kg/m²   ARISCAT ( Canet) risk index- risk class -  Intermediate , if duration of surgery is under 2 hours,higher, if duration of surgery is over 3 hours   Called to follow up   Spoke to patient   Hydrating well   Urinating well  Hold losartan- HCTZ tomorrow morning and may not need in future also at this rate  Suggested follow up in the primary care setting   Left knee warm)( Not hot) , swollen   No suggestion of septic arthritis      "

## 2018-04-16 NOTE — ASSESSMENT & PLAN NOTE
3/26/2018   PFT- Normal spirometry. Airflow not improved after bronchodilator. Diffusion capacity is mildly decreased

## 2018-04-16 NOTE — DISCHARGE INSTRUCTIONS
Your surgery has been scheduled for:__________________________________________    You should report to:  ____[facility], located on the [place] side of the first floor of the           [facility] ([phone])  ____[facility], located on the [facility] side of the            Second floor of the [facility] ([phone])  ____3rd Floor [facility] located on the [facility] side of the [facility] [phone]  Please Note   - Tell your doctor if you take Aspirin, products containing Aspirin, herbal medications  or blood thinners, such as Coumadin, Ticlid, or Plavix.  (Consult your provider regarding holding or stopping before surgery).  - Arrange for someone to drive you home following surgery.  You will not be allowed to leave the surgical facility alone or drive yourself home following sedation and anesthesia.  Before Surgery  - Stop taking all herbal medications 14days prior to surgery  - No Motrin/Advil (Ibuprofen) 7 days before surgery  - No Aleve (Naproxen) 7 days before surgery  - Stop Taking Asprin, products containing Asprin _____days before surgery  - Stop taking blood thinners_______days before surgery  - Refrain from drinking alcoholic beverages for 24hours before and after surgery  - Stop or limit smoking _________days before surgery  Night before Surgery  - DO NOT EAT OR DRINK ANYTHING AFTER MIDNIGHT, INCLUDING GUM, HARD CANDY, MINTS, OR CHEWING TOBACCO.  - Take a shower or bath (shower is recommended).  Bathe with Hibiclens soap or an antibacterial soap from the neck down.  If not supplied by your surgeon, hibiclens soap will need to be purchased over the counter in pharmacy.  Rinse soap off thoroughly.  - Shampoo your hair with your regular shampoo  The Day of Surgery  - Take another bath or shower with hibiclens or any antibacterial soap, to reduce the chance of infection.  - Take heart and blood  pressure medications with a small sip of water, as advised by the perioperative team.  - Do not take fluid pills  - You may brush your teeth and rinse your mouth, but do not swall any additional water.   - Do not apply perfumes, powder, body lotions or deodorant on the day of surgery.  - Nail polish should be removed.  - Do not wear makeup or moisturizer  - Wear comfortable clothes, such as a button front shirt and loose fitting pants.  - Leave all jewelry, including body piercings, and valuables at home.    - Bring any devices you will neeed after surgery such as crutches or canes.  - If you have sleep apnea, please bring your CPAP machine  In the event that your physical condition changes including the onset of a cold or respiratory illness, or if you have to delay or cancel your surgery, please notify your surgeon.Anesthesia: General Anesthesia  Youre due to have surgery. During surgery, youll be given medication called anesthesia. (It is also called anesthetic.) This will keep you comfortable and pain-free. Your anesthesia provider will use general anesthesia. This sheet tells you more about it.  What is general anesthesia?     You are watched continuously during your procedure by the anesthesia provider   General anesthesia puts you into a state like deep sleep. It goes into the bloodstream (IV anesthetics), into the lungs (gas anesthetics), or both. You feel nothing during the procedure. You will not remember it. During the procedure, the anesthesia provider monitors you continuously. He or she checks your heart rate and rhythm, blood pressure, breathing, and blood oxygen.  · IV Anesthetics. IV anesthetics are given through an IV line in your arm. Theyre often given first. This is so you are asleep before a gas anesthetic is started. Some kinds of IV anesthetics relieve pain. Others relax you. Your doctor will decide which kind is best in your case.  · Gas Anesthetics. Gas anesthetics are breathed into the  lungs. They are often used to keep you asleep. They can be given through a facemask or a tube placed in your larynx or trachea (breathing tube).  ? If you have a facemask, your anesthesia provider will most likely place it over your nose and mouth while youre still awake. Youll breathe oxygen through the mask as your IV anesthetic is started. Gas anesthetic may be added through the mask.  ? If you have a tube in the larynx or trachea, it will be inserted into your throat after youre asleep.  Anesthesia tools and medications  You will likely have:  · IV anesthetics. These are put into an IV line into your bloodstream.  · Gas anesthetics. You breathe these anesthetics into your lungs, where they pass into your bloodstream.  · Pulse oximeter. This is a small clip that is attached to the end of your finger. This measures your blood oxygen level.  · Electrocardiography leads (electrodes). These are small sticky pads that are placed on your chest. They record your heart rate and rhythm.  · Blood pressure cuff. This reads your blood pressure.  Risks and possible complications  General anesthesia has some risks. These include:  · Breathing problems  · Nausea and vomiting  · Sore throat or hoarseness (usually temporary)  · Allergic reaction to the anesthetic  · Irregular heartbeat (rare)  · Cardiac arrest (rare)   Anesthesia safety  · Follow all instructions you are given for how long not to eat or drink before your procedure.  · Be sure your doctor knows what medications and drugs you take. This includes over-the-counter medications, herbs, supplements, alcohol or other drugs. You will be asked when those were last taken.  · Have an adult family member or friend drive you home after the procedure.  · For the first 24 hours after your surgery:  ? Do not drive or use heavy equipment.  ? Have a trusted family member or spouse make important decisions or sign documents.  ? Avoid alcohol.  ? Have a responsible adult stay with  you. He or she can watch for problems and help keep you safe.  Date Last Reviewed: 10/16/2014  © 2403-1910 [employer]. [address] [place] [address], [place] [address]. All rights reserved. This information is not intended as a substitute for professional medical care. Always follow your healthcare professional's instructions.

## 2018-04-16 NOTE — LETTER
April 16, 2018      Juliann Concepcion MD  [address] [facility] [address]           [facility] - Pre Op Consult  [address] [facility] [address]  Phone: [phone]          Patient: Glen Arroyo   MR Number: [id number]   YOB: 1950   Date of Visit: 4/16/2018       Dear Dr. Juliann Concepcion:    Thank you for referring Glen Arroyo to me for evaluation. Attached you will find relevant portions of my assessment and plan of care.    If you have questions, please do not hesitate to call me. I look forward to following Glen Arroyo along with you.    Sincerely,    María Anglin MD    Enclosure  CC:  MD Clint Cuevas MD Ambuga Badari, MD    If you would like to receive this communication electronically, please contact [email or web address] or [phone] to request more information on [employer] access.    For providers and/or their staff who would like to refer a patient to [facility], please contact us through our one-stop-shop provider referral line, [facility], at [phone].    If you feel you have received this communication in error or would no longer like to receive these types of communications, please e-mail [email or web address]

## 2018-04-17 ENCOUNTER — HOSPITAL ENCOUNTER (INPATIENT)
Facility: HOSPITAL | Age: 68
LOS: 2 days | Discharge: HOME | DRG: 164 | End: 2018-04-19
Attending: THORACIC SURGERY (CARDIOTHORACIC VASCULAR SURGERY) | Admitting: THORACIC SURGERY (CARDIOTHORACIC VASCULAR SURGERY)
Payer: MEDICARE

## 2018-04-17 ENCOUNTER — ANESTHESIA (OUTPATIENT)
Dept: SURGERY | Facility: HOSPITAL | Age: 68
DRG: 164 | End: 2018-04-17
Payer: MEDICARE

## 2018-04-17 DIAGNOSIS — R91.1 PULMONARY NODULE, LEFT: ICD-10-CM

## 2018-04-17 DIAGNOSIS — D46.9 MDS (MYELODYSPLASTIC SYNDROME): Primary | ICD-10-CM

## 2018-04-17 DIAGNOSIS — R91.1 LUNG NODULE: ICD-10-CM

## 2018-04-17 LAB
BLD PROD TYP BPU: NORMAL
BLOOD UNIT EXPIRATION DATE: NORMAL
BLOOD UNIT TYPE CODE: 6200
BLOOD UNIT TYPE: NORMAL
CODING SYSTEM: NORMAL
DISPENSE STATUS: NORMAL
NUM UNITS TRANS WBC-POOR PLATPHERESIS: NORMAL

## 2018-04-17 PROCEDURE — 88313 SPECIAL STAINS GROUP 2: CPT | Mod: 26,,, | Performed by: PATHOLOGY

## 2018-04-17 PROCEDURE — 94761 N-INVAS EAR/PLS OXIMETRY MLT: CPT

## 2018-04-17 PROCEDURE — 36000711: Performed by: THORACIC SURGERY (CARDIOTHORACIC VASCULAR SURGERY)

## 2018-04-17 PROCEDURE — 94640 AIRWAY INHALATION TREATMENT: CPT

## 2018-04-17 PROCEDURE — 36000710: Performed by: THORACIC SURGERY (CARDIOTHORACIC VASCULAR SURGERY)

## 2018-04-17 PROCEDURE — 63600175 PHARM REV CODE 636 W HCPCS: Performed by: THORACIC SURGERY (CARDIOTHORACIC VASCULAR SURGERY)

## 2018-04-17 PROCEDURE — 63600175 PHARM REV CODE 636 W HCPCS: Performed by: ANESTHESIOLOGY

## 2018-04-17 PROCEDURE — 0BBG4ZZ EXCISION OF LEFT UPPER LUNG LOBE, PERCUTANEOUS ENDOSCOPIC APPROACH: ICD-10-PCS | Performed by: THORACIC SURGERY (CARDIOTHORACIC VASCULAR SURGERY)

## 2018-04-17 PROCEDURE — 37000009 HC ANESTHESIA EA ADD 15 MINS: Performed by: THORACIC SURGERY (CARDIOTHORACIC VASCULAR SURGERY)

## 2018-04-17 PROCEDURE — 25000003 PHARM REV CODE 250: Performed by: THORACIC SURGERY (CARDIOTHORACIC VASCULAR SURGERY)

## 2018-04-17 PROCEDURE — 88309 TISSUE EXAM BY PATHOLOGIST: CPT | Mod: 26,,, | Performed by: PATHOLOGY

## 2018-04-17 PROCEDURE — 27201037 HC PRESSURE MONITORING SET UP

## 2018-04-17 PROCEDURE — 25000003 PHARM REV CODE 250: Performed by: ANESTHESIOLOGY

## 2018-04-17 PROCEDURE — D9220A PRA ANESTHESIA: Mod: ,,, | Performed by: ANESTHESIOLOGY

## 2018-04-17 PROCEDURE — 37000008 HC ANESTHESIA 1ST 15 MINUTES: Performed by: THORACIC SURGERY (CARDIOTHORACIC VASCULAR SURGERY)

## 2018-04-17 PROCEDURE — 63600175 PHARM REV CODE 636 W HCPCS: Performed by: PHYSICIAN ASSISTANT

## 2018-04-17 PROCEDURE — 27000221 HC OXYGEN, UP TO 24 HOURS

## 2018-04-17 PROCEDURE — 63600175 PHARM REV CODE 636 W HCPCS: Performed by: STUDENT IN AN ORGANIZED HEALTH CARE EDUCATION/TRAINING PROGRAM

## 2018-04-17 PROCEDURE — 20600001 HC STEP DOWN PRIVATE ROOM

## 2018-04-17 PROCEDURE — 36620 INSERTION CATHETER ARTERY: CPT | Mod: 59,,, | Performed by: ANESTHESIOLOGY

## 2018-04-17 PROCEDURE — P9037 PLATE PHERES LEUKOREDU IRRAD: HCPCS

## 2018-04-17 PROCEDURE — 71000033 HC RECOVERY, INTIAL HOUR: Performed by: THORACIC SURGERY (CARDIOTHORACIC VASCULAR SURGERY)

## 2018-04-17 PROCEDURE — 99499 UNLISTED E&M SERVICE: CPT | Mod: ,,, | Performed by: PHYSICIAN ASSISTANT

## 2018-04-17 PROCEDURE — 88342 IMHCHEM/IMCYTCHM 1ST ANTB: CPT | Performed by: PATHOLOGY

## 2018-04-17 PROCEDURE — 71000039 HC RECOVERY, EACH ADD'L HOUR: Performed by: THORACIC SURGERY (CARDIOTHORACIC VASCULAR SURGERY)

## 2018-04-17 PROCEDURE — 25000242 PHARM REV CODE 250 ALT 637 W/ HCPCS: Performed by: THORACIC SURGERY (CARDIOTHORACIC VASCULAR SURGERY)

## 2018-04-17 PROCEDURE — C1729 CATH, DRAINAGE: HCPCS | Performed by: THORACIC SURGERY (CARDIOTHORACIC VASCULAR SURGERY)

## 2018-04-17 PROCEDURE — C9290 INJ, BUPIVACAINE LIPOSOME: HCPCS | Performed by: THORACIC SURGERY (CARDIOTHORACIC VASCULAR SURGERY)

## 2018-04-17 PROCEDURE — 27201423 OPTIME MED/SURG SUP & DEVICES STERILE SUPPLY: Performed by: THORACIC SURGERY (CARDIOTHORACIC VASCULAR SURGERY)

## 2018-04-17 PROCEDURE — S0028 INJECTION, FAMOTIDINE, 20 MG: HCPCS | Performed by: THORACIC SURGERY (CARDIOTHORACIC VASCULAR SURGERY)

## 2018-04-17 PROCEDURE — 88342 IMHCHEM/IMCYTCHM 1ST ANTB: CPT | Mod: 26,,, | Performed by: PATHOLOGY

## 2018-04-17 PROCEDURE — 32666 THORACOSCOPY W/WEDGE RESECT: CPT | Mod: LT,,, | Performed by: THORACIC SURGERY (CARDIOTHORACIC VASCULAR SURGERY)

## 2018-04-17 PROCEDURE — 99900035 HC TECH TIME PER 15 MIN (STAT)

## 2018-04-17 PROCEDURE — 25000003 PHARM REV CODE 250: Performed by: PHYSICIAN ASSISTANT

## 2018-04-17 RX ORDER — PHENYLEPHRINE HYDROCHLORIDE 10 MG/ML
INJECTION INTRAVENOUS
Status: DISCONTINUED | OUTPATIENT
Start: 2018-04-17 | End: 2018-04-18

## 2018-04-17 RX ORDER — HYDROCODONE BITARTRATE AND ACETAMINOPHEN 5; 325 MG/1; MG/1
1 TABLET ORAL EVERY 4 HOURS PRN
Status: DISCONTINUED | OUTPATIENT
Start: 2018-04-17 | End: 2018-04-19 | Stop reason: HOSPADM

## 2018-04-17 RX ORDER — SODIUM CHLORIDE 9 MG/ML
INJECTION, SOLUTION INTRAVENOUS CONTINUOUS PRN
Status: DISCONTINUED | OUTPATIENT
Start: 2018-04-17 | End: 2018-04-18

## 2018-04-17 RX ORDER — AMOXICILLIN 250 MG
1 CAPSULE ORAL 2 TIMES DAILY
Status: DISCONTINUED | OUTPATIENT
Start: 2018-04-17 | End: 2018-04-19 | Stop reason: HOSPADM

## 2018-04-17 RX ORDER — FAMOTIDINE 10 MG/ML
20 INJECTION INTRAVENOUS EVERY 12 HOURS
Status: DISCONTINUED | OUTPATIENT
Start: 2018-04-17 | End: 2018-04-19 | Stop reason: HOSPADM

## 2018-04-17 RX ORDER — KETAMINE HYDROCHLORIDE 100 MG/ML
INJECTION, SOLUTION INTRAMUSCULAR; INTRAVENOUS
Status: DISCONTINUED | OUTPATIENT
Start: 2018-04-17 | End: 2018-04-18

## 2018-04-17 RX ORDER — HYDROCODONE BITARTRATE AND ACETAMINOPHEN 10; 325 MG/1; MG/1
TABLET ORAL
Status: DISPENSED
Start: 2018-04-17 | End: 2018-04-18

## 2018-04-17 RX ORDER — SODIUM CHLORIDE 0.9 % (FLUSH) 0.9 %
3 SYRINGE (ML) INJECTION
Status: DISCONTINUED | OUTPATIENT
Start: 2018-04-17 | End: 2018-04-17

## 2018-04-17 RX ORDER — ONDANSETRON 2 MG/ML
INJECTION INTRAMUSCULAR; INTRAVENOUS
Status: DISCONTINUED | OUTPATIENT
Start: 2018-04-17 | End: 2018-04-18

## 2018-04-17 RX ORDER — HYDROCODONE BITARTRATE AND ACETAMINOPHEN 500; 5 MG/1; MG/1
TABLET ORAL
Status: DISCONTINUED | OUTPATIENT
Start: 2018-04-17 | End: 2018-04-17

## 2018-04-17 RX ORDER — LACTULOSE 10 G/15ML
20 SOLUTION ORAL EVERY 6 HOURS PRN
Status: DISCONTINUED | OUTPATIENT
Start: 2018-04-17 | End: 2018-04-19 | Stop reason: HOSPADM

## 2018-04-17 RX ORDER — MIDAZOLAM HYDROCHLORIDE 1 MG/ML
INJECTION INTRAMUSCULAR; INTRAVENOUS
Status: DISCONTINUED | OUTPATIENT
Start: 2018-04-17 | End: 2018-04-18

## 2018-04-17 RX ORDER — ACETAMINOPHEN 10 MG/ML
INJECTION, SOLUTION INTRAVENOUS
Status: DISCONTINUED | OUTPATIENT
Start: 2018-04-17 | End: 2018-04-18

## 2018-04-17 RX ORDER — BISACODYL 10 MG
10 SUPPOSITORY, RECTAL RECTAL DAILY PRN
Status: DISCONTINUED | OUTPATIENT
Start: 2018-04-17 | End: 2018-04-19 | Stop reason: HOSPADM

## 2018-04-17 RX ORDER — HYDROMORPHONE HYDROCHLORIDE 1 MG/ML
0.5 INJECTION, SOLUTION INTRAMUSCULAR; INTRAVENOUS; SUBCUTANEOUS EVERY 4 HOURS PRN
Status: DISCONTINUED | OUTPATIENT
Start: 2018-04-17 | End: 2018-04-19 | Stop reason: HOSPADM

## 2018-04-17 RX ORDER — LIDOCAINE HCL/PF 100 MG/5ML
SYRINGE (ML) INTRAVENOUS
Status: DISCONTINUED | OUTPATIENT
Start: 2018-04-17 | End: 2018-04-18

## 2018-04-17 RX ORDER — ROCURONIUM BROMIDE 10 MG/ML
INJECTION, SOLUTION INTRAVENOUS
Status: DISCONTINUED | OUTPATIENT
Start: 2018-04-17 | End: 2018-04-18

## 2018-04-17 RX ORDER — METOCLOPRAMIDE HYDROCHLORIDE 5 MG/ML
5 INJECTION INTRAMUSCULAR; INTRAVENOUS EVERY 6 HOURS PRN
Status: DISCONTINUED | OUTPATIENT
Start: 2018-04-17 | End: 2018-04-17

## 2018-04-17 RX ORDER — FENTANYL CITRATE 50 UG/ML
INJECTION, SOLUTION INTRAMUSCULAR; INTRAVENOUS
Status: DISCONTINUED | OUTPATIENT
Start: 2018-04-17 | End: 2018-04-18

## 2018-04-17 RX ORDER — PROPOFOL 10 MG/ML
INJECTION, EMULSION INTRAVENOUS
Status: DISCONTINUED | OUTPATIENT
Start: 2018-04-17 | End: 2018-04-18

## 2018-04-17 RX ORDER — FENTANYL CITRATE 50 UG/ML
25 INJECTION, SOLUTION INTRAMUSCULAR; INTRAVENOUS EVERY 5 MIN PRN
Status: COMPLETED | OUTPATIENT
Start: 2018-04-17 | End: 2018-04-17

## 2018-04-17 RX ORDER — HYDROCODONE BITARTRATE AND ACETAMINOPHEN 10; 325 MG/1; MG/1
1 TABLET ORAL EVERY 4 HOURS PRN
Status: DISCONTINUED | OUTPATIENT
Start: 2018-04-17 | End: 2018-04-19 | Stop reason: HOSPADM

## 2018-04-17 RX ORDER — FENTANYL CITRATE 50 UG/ML
INJECTION, SOLUTION INTRAMUSCULAR; INTRAVENOUS
Status: DISCONTINUED
Start: 2018-04-17 | End: 2018-04-17 | Stop reason: WASHOUT

## 2018-04-17 RX ORDER — IPRATROPIUM BROMIDE AND ALBUTEROL SULFATE 2.5; .5 MG/3ML; MG/3ML
3 SOLUTION RESPIRATORY (INHALATION) EVERY 6 HOURS
Status: DISCONTINUED | OUTPATIENT
Start: 2018-04-17 | End: 2018-04-19 | Stop reason: HOSPADM

## 2018-04-17 RX ORDER — ACETAMINOPHEN 325 MG/1
650 TABLET ORAL EVERY 4 HOURS PRN
Status: DISCONTINUED | OUTPATIENT
Start: 2018-04-17 | End: 2018-04-19 | Stop reason: HOSPADM

## 2018-04-17 RX ORDER — CEFAZOLIN SODIUM 1 G/3ML
2 INJECTION, POWDER, FOR SOLUTION INTRAMUSCULAR; INTRAVENOUS
Status: COMPLETED | OUTPATIENT
Start: 2018-04-17 | End: 2018-04-17

## 2018-04-17 RX ADMIN — KETAMINE HYDROCHLORIDE 40 MG: 100 INJECTION, SOLUTION, CONCENTRATE INTRAMUSCULAR; INTRAVENOUS at 01:04

## 2018-04-17 RX ADMIN — SODIUM CHLORIDE: 0.9 INJECTION, SOLUTION INTRAVENOUS at 11:04

## 2018-04-17 RX ADMIN — PROPOFOL 150 MG: 10 INJECTION, EMULSION INTRAVENOUS at 01:04

## 2018-04-17 RX ADMIN — ROCURONIUM BROMIDE 30 MG: 10 INJECTION, SOLUTION INTRAVENOUS at 02:04

## 2018-04-17 RX ADMIN — SODIUM CHLORIDE: 0.9 INJECTION, SOLUTION INTRAVENOUS at 01:04

## 2018-04-17 RX ADMIN — PROPOFOL 30 MG: 10 INJECTION, EMULSION INTRAVENOUS at 02:04

## 2018-04-17 RX ADMIN — FENTANYL CITRATE 25 MCG: 50 INJECTION INTRAMUSCULAR; INTRAVENOUS at 03:04

## 2018-04-17 RX ADMIN — ONDANSETRON 4 MG: 2 INJECTION INTRAMUSCULAR; INTRAVENOUS at 02:04

## 2018-04-17 RX ADMIN — LIDOCAINE HYDROCHLORIDE 80 MG: 20 INJECTION, SOLUTION INTRAVENOUS at 01:04

## 2018-04-17 RX ADMIN — CEFAZOLIN 2 G: 330 INJECTION, POWDER, FOR SOLUTION INTRAMUSCULAR; INTRAVENOUS at 02:04

## 2018-04-17 RX ADMIN — PHENYLEPHRINE HYDROCHLORIDE 100 MCG: 10 INJECTION INTRAVENOUS at 02:04

## 2018-04-17 RX ADMIN — FENTANYL CITRATE 25 MCG: 50 INJECTION INTRAMUSCULAR; INTRAVENOUS at 04:04

## 2018-04-17 RX ADMIN — HYDROCODONE BITARTRATE AND ACETAMINOPHEN 1 TABLET: 10; 325 TABLET ORAL at 10:04

## 2018-04-17 RX ADMIN — ACETAMINOPHEN 1000 MG: 10 INJECTION, SOLUTION INTRAVENOUS at 02:04

## 2018-04-17 RX ADMIN — KETAMINE HYDROCHLORIDE 10 MG: 100 INJECTION, SOLUTION, CONCENTRATE INTRAMUSCULAR; INTRAVENOUS at 01:04

## 2018-04-17 RX ADMIN — IPRATROPIUM BROMIDE AND ALBUTEROL SULFATE 3 ML: .5; 3 SOLUTION RESPIRATORY (INHALATION) at 07:04

## 2018-04-17 RX ADMIN — FENTANYL CITRATE 50 MCG: 50 INJECTION, SOLUTION INTRAMUSCULAR; INTRAVENOUS at 01:04

## 2018-04-17 RX ADMIN — FAMOTIDINE 20 MG: 10 INJECTION INTRAVENOUS at 09:04

## 2018-04-17 RX ADMIN — SUGAMMADEX 200 MG: 100 INJECTION, SOLUTION INTRAVENOUS at 03:04

## 2018-04-17 RX ADMIN — MIDAZOLAM HYDROCHLORIDE 2 MG: 1 INJECTION, SOLUTION INTRAMUSCULAR; INTRAVENOUS at 01:04

## 2018-04-17 RX ADMIN — HYDROCODONE BITARTRATE AND ACETAMINOPHEN 1 TABLET: 10; 325 TABLET ORAL at 06:04

## 2018-04-17 RX ADMIN — SODIUM CHLORIDE, SODIUM GLUCONATE, SODIUM ACETATE, POTASSIUM CHLORIDE, MAGNESIUM CHLORIDE, SODIUM PHOSPHATE, DIBASIC, AND POTASSIUM PHOSPHATE: .53; .5; .37; .037; .03; .012; .00082 INJECTION, SOLUTION INTRAVENOUS at 01:04

## 2018-04-17 RX ADMIN — PHENYLEPHRINE HYDROCHLORIDE 50 MCG: 10 INJECTION INTRAVENOUS at 01:04

## 2018-04-17 RX ADMIN — ROCURONIUM BROMIDE 40 MG: 10 INJECTION, SOLUTION INTRAVENOUS at 01:04

## 2018-04-17 RX ADMIN — HYDROCODONE BITARTRATE AND ACETAMINOPHEN 1 TABLET: 10; 325 TABLET ORAL at 03:04

## 2018-04-17 RX ADMIN — HYDROMORPHONE HYDROCHLORIDE 0.5 MG: 1 INJECTION, SOLUTION INTRAMUSCULAR; INTRAVENOUS; SUBCUTANEOUS at 09:04

## 2018-04-17 NOTE — PLAN OF CARE
Problem: Patient Care Overview  Goal: Plan of Care Review  Outcome: Ongoing (interventions implemented as appropriate)  Plan of care reviewed with pt. Pt AAOx's4, vital signs stable. Currently on 2L O2 per nasal canula. No complaints of pain. Pt Left side chest tube intact and connected to wall suction. No nausea or vomiting noted. Bed in low and locked position with call light in reach. No acute events at this time. TM

## 2018-04-17 NOTE — OP NOTE
[facility]  Cardiothoracic Surgery  Operative Note    SUMMARY     Date of Procedure: 4/17/2018     Procedure: Procedure(s) (LRB):  THORACOSCOPY-VIDEO ASSISTED (VATS) W/WEDGE LUNG RESECTION (Left)    Surgeon(s) and Role:     * Clint Machado MD - Primary     * NICOLAS Olvera - Assisting     * Franklin Landis MD - Fellow    Pre-Operative Diagnosis: Pulmonary nodule, left [R91.1]    Post-Operative Diagnosis: Post-Op Diagnosis Codes:     * Pulmonary nodule, left [R91.1]    Anesthesia: General    Medications: 2 grams IV Ancef and 1 unit of platelets    Indications: This is a 67 year-old gentleman with a history of newly diagnosed myelodysplastic syndrome (MDS).  He was also found to have a 2.0 x 0.8cm spiculated nodule in the left upper lobe.  The nodule was first seen in 2016 and now had an increasing solid component.  The lesion was not amenable to interventional radiology biopsy.  His case was presented at the multidisciplinary tumor board and the consensus was for VATS wedge resection for diagnosis.  A more limited procedure was desirable to allow the patient to undergo therapy for his MDS as soon as possible.    Operative Findings: Three port sites.  Adhesiolysis performed of the left upper lobe.  Therapeutic wedge resection of left upper lobe.  Lesion confirmed within specimen.    Description of the Procedure: The patient was brought to the operating room.  General anesthesia was induced.  A double lumen endotracheal tube was placed and confirmed by anesthesia.  Platelets were administered prior to incision given a platelet count of 52,000 in the setting of MDS.  The patient was placed in the right lateral decubitus position.  Three ports were placed.  Adhesions of the left upper lobe to the chest wall and mediastinal fat were lysed with electrocautery.  There were small blood vessels within the adhesions that were carefully cauterized.  The lesion in question was palpated at  the apex.  A wedge resection was performed with serial fires of the [place] 45mm stapler with blue loads.  The specimen was removed in an endoscopic retrieval bag and the lesion was confirmed within the specimen.  One 24Fr Huseyin drain was placed through the anterior port and directed to the apex.  The lung was reinflated.  The port sites were closed with 3-0 Vicryl and 4-0 Monocryl.  The patient tolerated the procedure well and was taken to the post-anesthesia care unit in stable condition.    Complications: None apparent    Estimated Blood Loss (EBL): 10mL           Implants: None    Specimens:   Specimen (12h ago through future)    Start     Ordered    04/17/18 1520  Specimen to Pathology - Surgery  Once     Comments:  Left Upper Lobe Wedge Resection - Perm - PRIORITY      04/17/18 4467                  Condition: Good    Disposition: PACU - hemodynamically stable.    Postoperative Plan: The patient will be admitted to the surgical maki.  His diet will be advanced as tolerated.  His chest tube will be placed to 20cm H20 wall suction.  His pathology will be requested to be expedited to allow for treatment for MDS to begin as soon as possible.    Attestation: Dr. Machado was present and scrubbed for the critical portions of the case and otherwise immediately available.      Franklin Landis MD  Thoracic Surgery Resident, PGY6  General Thoracic Surgery  [facility]

## 2018-04-17 NOTE — BRIEF OP NOTE
[facility]  Brief Operative Note    SUMMARY     Surgery Date: 4/17/2018     Surgeon(s) and Role:     * Clint Machado MD - Primary     * Franklin Landis MD - Fellow     * NICOLAS Olvera - Assisting    Pre-op Diagnosis:  Pulmonary nodule, left [R91.1]    Post-op Diagnosis:  Post-Op Diagnosis Codes:     * Pulmonary nodule, left [R91.1]    Procedure(s) (LRB):  THORACOSCOPY-VIDEO ASSISTED (VATS) W/WEDGE LUNG RESECTION (Left)    Anesthesia: General    Description of Procedure: Three port sites.  Therapeutic wedge resection of left upper lobe.  Lesion confirmed within specimen.    Description of the findings of the procedure: One 24Fr Huseyin drain directed to apex.    Estimated Blood Loss: 10mL         Specimens:   Specimen (12h ago through future)    Start     Ordered    04/17/18 1520  Specimen to Pathology - Surgery  Once     Comments:  Left Upper Lobe Wedge Resection - Perm - PRIORITY      04/17/18 1519        Franklin Landis MD  Thoracic Surgery Resident, PGY6  General Thoracic Surgery  [facility]

## 2018-04-17 NOTE — NURSING TRANSFER
Nursing Transfer Note      4/17/2018     Transfer To: [facility] From:PACU    Transfer via stretcher    Transfer with     Transported by RN    Medicines sent:     Chart send with patient: Yes    Notified:     Patient reassessed at:  (4/17/18, 1224)    Upon arrival to floor: patient oriented to room, call bell in reach and bed in lowest position

## 2018-04-17 NOTE — PROGRESS NOTES
NICOLAS Hope at pt's bedside, stated she would review the Xray and to keep the CT to wall suction at all times over night. RULA

## 2018-04-17 NOTE — PROGRESS NOTES
Xray at the bedside to perform STAT xray. Informed PA on call for thoracic sx pt's chest tube was set to wall suction, but a possible airleak present. CT bubbling loudly and L chest incision CDI. Pt on 2 L NC denies SOB.

## 2018-04-17 NOTE — INTERVAL H&P NOTE
The patient has been examined and the H&P has been reviewed:    I concur with the findings and changes have been noted since the H&P was written: Platelets 52.  Will get 1 unit of platelets just prior to procedure today.  They will be irradiated as recommended by hematology.    Anesthesia/Surgery risks, benefits and alternative options discussed and understood by patient/family.    Active Hospital Problems    Diagnosis  POA    Lung nodule [R91.1]  Yes      Resolved Hospital Problems    Diagnosis Date Resolved POA   No resolved problems to display.     Franklin Landis MD  Thoracic Surgery Resident, PGY6  General Thoracic Surgery  [facility]

## 2018-04-17 NOTE — ANESTHESIA PROCEDURE NOTES
Arterial    Diagnosis: SOB    Patient location during procedure: done in OR  Procedure start time: 4/17/2018 2:01 PM  Timeout: 4/17/2018 2:00 PM  Procedure end time: 4/17/2018 2:04 PM  Staffing  Anesthesiologist: DOREEN ORTA  Resident/CRNA: TOPHER STOVER  Performed: resident/CRNA   Anesthesiologist was present at the time of the procedure.  Preanesthetic Checklist  Completed: patient identified, site marked, surgical consent, pre-op evaluation, timeout performed, IV checked, risks and benefits discussed, monitors and equipment checked and anesthesia consent givenArterial  Skin Prep: chlorhexidine gluconate  Local Infiltration: none  Orientation: right  Location: radial  Catheter Size: 20 G  Catheter placement by Anatomical landmarks. Heme positive aspiration all ports.Insertion Attempts: 3  Assessment  Dressing: secured with tape and tegaderm  Patient: Tolerated well

## 2018-04-17 NOTE — PLAN OF CARE
POC reviewed with pt and partner, both acknowledged understanding. Pt remains free of falls/injuries. Pt on telemetry remains SR. Pt VSS. Pt on RA denies SOB.  Pt tolerating clear liquid diet. Pt pain controlled with prescribed meds. Pt CT to cont wall suction per MD order. Pt jose score 9/10. No acute events throughout shift. No distress noted, will continue to monitor.

## 2018-04-18 LAB
ANION GAP SERPL CALC-SCNC: 8 MMOL/L
ANISOCYTOSIS BLD QL SMEAR: ABNORMAL
BASOPHILS # BLD AUTO: 0 K/UL
BASOPHILS NFR BLD: 0 %
BUN SERPL-MCNC: 17 MG/DL
BURR CELLS BLD QL SMEAR: ABNORMAL
CALCIUM SERPL-MCNC: 8.8 MG/DL
CHLORIDE SERPL-SCNC: 100 MMOL/L
CO2 SERPL-SCNC: 23 MMOL/L
CREAT SERPL-MCNC: 0.7 MG/DL
DIFFERENTIAL METHOD: ABNORMAL
EOSINOPHIL # BLD AUTO: 0 K/UL
EOSINOPHIL NFR BLD: 0.5 %
ERYTHROCYTE [DISTWIDTH] IN BLOOD BY AUTOMATED COUNT: 21.3 %
EST. GFR  (AFRICAN AMERICAN): >60 ML/MIN/1.73 M^2
EST. GFR  (NON AFRICAN AMERICAN): >60 ML/MIN/1.73 M^2
GLUCOSE SERPL-MCNC: 105 MG/DL
HCT VFR BLD AUTO: 23.6 %
HGB BLD-MCNC: 7.8 G/DL
IMM GRANULOCYTES # BLD AUTO: 0.04 K/UL
IMM GRANULOCYTES NFR BLD AUTO: 1 %
LYMPHOCYTES # BLD AUTO: 0.9 K/UL
LYMPHOCYTES NFR BLD: 21 %
MAGNESIUM SERPL-MCNC: 1.8 MG/DL
MCH RBC QN AUTO: 32 PG
MCHC RBC AUTO-ENTMCNC: 33.1 G/DL
MCV RBC AUTO: 97 FL
MONOCYTES # BLD AUTO: 1.4 K/UL
MONOCYTES NFR BLD: 32.9 %
NEUTROPHILS # BLD AUTO: 1.9 K/UL
NEUTROPHILS NFR BLD: 44.6 %
NRBC BLD-RTO: 0 /100 WBC
PHOSPHATE SERPL-MCNC: 3.2 MG/DL
PLATELET # BLD AUTO: 53 K/UL
PLATELET BLD QL SMEAR: ABNORMAL
PMV BLD AUTO: ABNORMAL FL
POIKILOCYTOSIS BLD QL SMEAR: SLIGHT
POTASSIUM SERPL-SCNC: 4.2 MMOL/L
RBC # BLD AUTO: 2.44 M/UL
SCHISTOCYTES BLD QL SMEAR: PRESENT
SODIUM SERPL-SCNC: 131 MMOL/L
SPHEROCYTES BLD QL SMEAR: ABNORMAL
WBC # BLD AUTO: 4.19 K/UL

## 2018-04-18 PROCEDURE — 83735 ASSAY OF MAGNESIUM: CPT

## 2018-04-18 PROCEDURE — 36415 COLL VENOUS BLD VENIPUNCTURE: CPT

## 2018-04-18 PROCEDURE — 84100 ASSAY OF PHOSPHORUS: CPT

## 2018-04-18 PROCEDURE — 94761 N-INVAS EAR/PLS OXIMETRY MLT: CPT

## 2018-04-18 PROCEDURE — 80048 BASIC METABOLIC PNL TOTAL CA: CPT

## 2018-04-18 PROCEDURE — 63600175 PHARM REV CODE 636 W HCPCS: Performed by: STUDENT IN AN ORGANIZED HEALTH CARE EDUCATION/TRAINING PROGRAM

## 2018-04-18 PROCEDURE — 94640 AIRWAY INHALATION TREATMENT: CPT

## 2018-04-18 PROCEDURE — 94799 UNLISTED PULMONARY SVC/PX: CPT

## 2018-04-18 PROCEDURE — 25000242 PHARM REV CODE 250 ALT 637 W/ HCPCS: Performed by: THORACIC SURGERY (CARDIOTHORACIC VASCULAR SURGERY)

## 2018-04-18 PROCEDURE — 63600175 PHARM REV CODE 636 W HCPCS: Performed by: PHYSICIAN ASSISTANT

## 2018-04-18 PROCEDURE — 25000003 PHARM REV CODE 250: Performed by: THORACIC SURGERY (CARDIOTHORACIC VASCULAR SURGERY)

## 2018-04-18 PROCEDURE — 85025 COMPLETE CBC W/AUTO DIFF WBC: CPT

## 2018-04-18 PROCEDURE — 20600001 HC STEP DOWN PRIVATE ROOM

## 2018-04-18 PROCEDURE — S0028 INJECTION, FAMOTIDINE, 20 MG: HCPCS | Performed by: THORACIC SURGERY (CARDIOTHORACIC VASCULAR SURGERY)

## 2018-04-18 PROCEDURE — 27000221 HC OXYGEN, UP TO 24 HOURS

## 2018-04-18 RX ORDER — FUROSEMIDE 10 MG/ML
20 INJECTION INTRAMUSCULAR; INTRAVENOUS ONCE
Status: COMPLETED | OUTPATIENT
Start: 2018-04-18 | End: 2018-04-18

## 2018-04-18 RX ADMIN — IPRATROPIUM BROMIDE AND ALBUTEROL SULFATE 3 ML: .5; 3 SOLUTION RESPIRATORY (INHALATION) at 07:04

## 2018-04-18 RX ADMIN — FUROSEMIDE 20 MG: 10 INJECTION, SOLUTION INTRAMUSCULAR; INTRAVENOUS at 09:04

## 2018-04-18 RX ADMIN — HYDROMORPHONE HYDROCHLORIDE 0.5 MG: 1 INJECTION, SOLUTION INTRAMUSCULAR; INTRAVENOUS; SUBCUTANEOUS at 02:04

## 2018-04-18 RX ADMIN — IPRATROPIUM BROMIDE AND ALBUTEROL SULFATE 3 ML: .5; 3 SOLUTION RESPIRATORY (INHALATION) at 12:04

## 2018-04-18 RX ADMIN — STANDARDIZED SENNA CONCENTRATE AND DOCUSATE SODIUM 1 TABLET: 8.6; 5 TABLET, FILM COATED ORAL at 08:04

## 2018-04-18 RX ADMIN — HYDROCODONE BITARTRATE AND ACETAMINOPHEN 1 TABLET: 10; 325 TABLET ORAL at 06:04

## 2018-04-18 RX ADMIN — HYDROCODONE BITARTRATE AND ACETAMINOPHEN 1 TABLET: 10; 325 TABLET ORAL at 02:04

## 2018-04-18 RX ADMIN — HYDROMORPHONE HYDROCHLORIDE 0.5 MG: 1 INJECTION, SOLUTION INTRAMUSCULAR; INTRAVENOUS; SUBCUTANEOUS at 06:04

## 2018-04-18 RX ADMIN — HYDROCODONE BITARTRATE AND ACETAMINOPHEN 1 TABLET: 10; 325 TABLET ORAL at 08:04

## 2018-04-18 RX ADMIN — FAMOTIDINE 20 MG: 10 INJECTION INTRAVENOUS at 08:04

## 2018-04-18 RX ADMIN — HYDROCODONE BITARTRATE AND ACETAMINOPHEN 1 TABLET: 10; 325 TABLET ORAL at 11:04

## 2018-04-18 RX ADMIN — HYDROCODONE BITARTRATE AND ACETAMINOPHEN 1 TABLET: 10; 325 TABLET ORAL at 04:04

## 2018-04-18 NOTE — NURSING
Notified Dr. Johnson that patient is c/o of severe pain to left lateral chest tube site. Only PO PRN medication curently ordered. MD stated ok to order Dilaudid 0.5mg IV Q4 PRN for severe, breakthrough pain. Also made MD aware of air leak and bubbling in chest tube and slight subcutaneous emphysema to left anterior chest.

## 2018-04-18 NOTE — PLAN OF CARE
S/P THORACOSCOPY-VIDEO ASSISTED (VATS) W/WEDGE LUNG RESECTION (Left) on 4/17/18. CM visited with pt to discuss dc plan. No dc needs noted. Will cont to follow.        04/18/18 1400   Discharge Assessment   Assessment Type Discharge Planning Assessment   Confirmed/corrected address and phone number on facesheet? Yes   Assessment information obtained from? Patient   Prior to hospitilization cognitive status: Alert/Oriented   Prior to hospitalization functional status: Independent   Current cognitive status: Alert/Oriented   Current Functional Status: Independent   Lives With other (see comments)  (partner)   Able to Return to Prior Arrangements yes   Is patient able to care for self after discharge? Yes   Who are your caregiver(s) and their phone number(s)? Arlen Vieyar [phone]   Patient's perception of discharge disposition home or selfcare   Readmission Within The Last 30 Days no previous admission in last 30 days   Patient currently being followed by outpatient case management? No   Patient currently receives any other outside agency services? No   Equipment Currently Used at Home none   Do you have any problems affording any of your prescribed medications? No   Is the patient taking medications as prescribed? yes   Does the patient have transportation home? Yes   Transportation Available family or friend will provide   Discharge Plan A Home with family   Discharge Plan B Home with family;Home Health   Patient/Family In Agreement With Plan yes

## 2018-04-18 NOTE — ANESTHESIA RELEASE NOTE
"Anesthesia Release from PACU Note    Patient: Glen Arroyo    Procedure(s) Performed: Procedure(s) (LRB):  THORACOSCOPY-VIDEO ASSISTED (VATS) W/WEDGE LUNG RESECTION (Left)    Anesthesia type: general    Post pain: Adequate analgesia    Post assessment: no apparent anesthetic complications    Last Vitals:   Visit Vitals  BP (!) 125/59 (Patient Position: Lying)   Pulse 87   Temp 36.8 °C (98.2 °F)   Resp 18   Ht 5' 9" (1.753 m)   Wt 64 kg (141 lb)   SpO2 (!) 92%   BMI 20.82 kg/m²       Post vital signs: stable    Level of consciousness: awake, alert  and oriented    Nausea/Vomiting: no nausea/no vomiting    Complications: none    Airway Patency: patent    Respiratory: unassisted    Cardiovascular: stable and blood pressure at baseline    Hydration: euvolemic  "

## 2018-04-18 NOTE — SUBJECTIVE & OBJECTIVE
Interval History: NAEON. Tolerating diet. Pain well controlled. Chest tube with continuous air leak on suction.     Medications:  Continuous Infusions:  Scheduled Meds:   albuterol-ipratropium 2.5mg-0.5mg/3mL  3 mL Nebulization Q6H    famotidine (PF)  20 mg Intravenous Q12H    senna-docusate 8.6-50 mg  1 tablet Oral BID     PRN Meds:acetaminophen, bisacodyl, hydrocodone-acetaminophen 10-325mg, hydrocodone-acetaminophen 5-325mg, HYDROmorphone, lactulose, promethazine (PHENERGAN) IVPB     Review of patient's allergies indicates:  No Known Allergies  Objective:     Vital Signs (Most Recent):  Temp: 98.2 °F (36.8 °C) (04/18/18 0728)  Pulse: 87 (04/18/18 0741)  Resp: 18 (04/18/18 0741)  BP: (!) 125/59 (04/18/18 0728)  SpO2: (!) 92 % (04/18/18 0741) Vital Signs (24h Range):  Temp:  [97.5 °F (36.4 °C)-98.3 °F (36.8 °C)] 98.2 °F (36.8 °C)  Pulse:  [82-98] 87  Resp:  [16-28] 18  SpO2:  [91 %-100 %] 92 %  BP: (102-140)/(55-75) 125/59     Intake/Output - Last 3 Shifts       04/16 0700 - 04/17 0659 04/17 0700 - 04/18 0659 04/18 0700 - 04/19 0659    P.O.  360     I.V. (mL/kg)  1200 (18.8)     Blood  218     Total Intake(mL/kg)  1778 (27.8)     Urine (mL/kg/hr)  550     Chest Tube  55     Total Output   605      Net   +1173                   SpO2: (!) 92 %  O2 Device (Oxygen Therapy): nasal cannula    Physical Exam   Constitutional: He is oriented to person, place, and time. He appears well-developed and well-nourished.   HENT:   Head: Normocephalic.   Eyes: Pupils are equal, round, and reactive to light.   Neck: Normal range of motion. Neck supple.   Cardiovascular: Normal rate, regular rhythm, normal heart sounds and intact distal pulses.    Pulmonary/Chest: Effort normal and breath sounds normal.   Chest tube site c/d/i. Minimal output. Airleak on suction.   Abdominal: Soft. Bowel sounds are normal. He exhibits no distension.   Musculoskeletal: Normal range of motion. He exhibits no edema.   Lymphadenopathy:     He has no  cervical adenopathy.   Neurological: He is alert and oriented to person, place, and time.   Skin: Skin is warm.   Vitals reviewed.      Significant Labs:  BMP:   Recent Labs  Lab 04/18/18  0447      *   K 4.2      CO2 23   BUN 17   CREATININE 0.7   CALCIUM 8.8   MG 1.8     CBC:   Recent Labs  Lab 04/18/18 0447   WBC 4.19   RBC 2.44*   HGB 7.8*   HCT 23.6*   PLT 53*   MCV 97   MCH 32.0*   MCHC 33.1     CMP:   Recent Labs  Lab 04/18/18  0447      CALCIUM 8.8   *   K 4.2   CO2 23      BUN 17   CREATININE 0.7     Coagulation: No results for input(s): PT, INR, APTT in the last 48 hours.    Significant Diagnostics:  CXR: I have reviewed all pertinent results/findings within the past 24 hours    VTE Risk Mitigation         Ordered     IP VTE HIGH RISK PATIENT  Once      04/17/18 1536     Place sequential compression device  Until discontinued      04/17/18 1536     Place MICHELINE hose  Until discontinued      04/17/18 1536     Reason for No Pharmacological VTE Prophylaxis  Once      04/17/18 1536

## 2018-04-18 NOTE — PLAN OF CARE
Problem: Patient Care Overview  Goal: Plan of Care Review  Outcome: Ongoing (interventions implemented as appropriate)  POC reviewed with patient who verbalized understanding. AAOx4. VSS on 2L Nc. Left lateral lap sites x2 intact with dressings in place. Left lateral chest tube in place to CWS -20, air leak present and subcutaneous emphysema to left anterior chest wall, MD aware. Dried drainage noted to occlusive dressing chest tube insertion site. Pain controlled with PRN medications per MAR. Tolerating PO fluids, voiding per urinal. Denies any nausea. BLE TEDS/SCDS in place. Safety precautions maintained, call light within reach. Remains free from falls and injury. No acute events. No distress noted. Will continue to monitor.

## 2018-04-18 NOTE — ANESTHESIA POSTPROCEDURE EVALUATION
"Anesthesia Post Evaluation    Patient: Glen Arroyo    Procedure(s) Performed: Procedure(s) (LRB):  THORACOSCOPY-VIDEO ASSISTED (VATS) W/WEDGE LUNG RESECTION (Left)    Final Anesthesia Type: general  Patient location during evaluation: PACU  Patient participation: Yes- Able to Participate  Level of consciousness: awake and alert  Post-procedure vital signs: reviewed and stable  Pain management: adequate  Airway patency: patent  PONV status at discharge: No PONV  Anesthetic complications: no      Cardiovascular status: blood pressure returned to baseline  Respiratory status: unassisted  Hydration status: euvolemic  Follow-up not needed.        Visit Vitals  BP (!) 125/59 (Patient Position: Lying)   Pulse 87   Temp 36.8 °C (98.2 °F)   Resp 18   Ht 5' 9" (1.753 m)   Wt 64 kg (141 lb)   SpO2 (!) 92%   BMI 20.82 kg/m²       Pain/Salvador Score: Pain Assessment Performed: Yes (4/18/2018  7:28 AM)  Presence of Pain: complains of pain/discomfort (4/18/2018  7:28 AM)  Pain Rating Prior to Med Admin: 6 (4/18/2018  6:39 AM)  Salvador Score: 9 (4/17/2018  4:33 PM)      "

## 2018-04-18 NOTE — ASSESSMENT & PLAN NOTE
67 year old male POD1 s/p left VATS for left upper lobe wedge     - Regular diet  - Chest tube to water seal  - Wean NC O2 for saturations above 90%  - Pulmonary toilet -IS/Duonebs  - Gentle diuresis- Lasix 20mg.  - Mechanical DVT prophylaxis

## 2018-04-18 NOTE — HPI
68 y/o male former smoker with AAA s/p PTA and stenting of bilateral iliacs, diverticulitis, HTN, tobacco abuse, and newly diagnosed MDS presenting for incidentally found 2.0 x 0.8cm spiculated nodule in DEIRDRE apex. Nodule likely dating back to 2016 with an increasing solid component. Recent hospital admission for asymptomatic anemia and now follows with hematology for MDS. Hematology would like treatment for lung nodule prior to treatment for MDS. Recommendations: leukoreduced, irradiated PRBC for hemoglobin < 7, Irradiated platelets for platelet count < 84548. He is using a walker today but reports left knee pain starting this weekend. Otherwise, he does not typically need a walker. Endorses decreased appetite and fatigue. Denies fever, chills, CP, SOB, nausea, vomiting. Denies prior cardiac history. Not on blood thinners.      Former smoker. Quit smoking Dec '17. 45 pack year history.   PSH: partial colectomy, colostomy and reversal, appendectomy, AAA endovascular repair

## 2018-04-18 NOTE — HOSPITAL COURSE
4/17/18- Left VATS for left upper lobe wedge  4/18/18- Chest tube to water seal. Ambulate. Pulmonary toilet.   4/19/18- Chest tube removed. Repeat CXR stable. Supplemental O2 weaned. Tolerating diet. Pain well controlled.

## 2018-04-18 NOTE — ASSESSMENT & PLAN NOTE
Per Hematology, leukoreduced, irradiated PRBC for hemoglobin < 7. Irradiated platelets for platelet count < 37640.   Will monitor with daily CBC

## 2018-04-18 NOTE — PLAN OF CARE
Problem: Patient Care Overview  Goal: Plan of Care Review  Outcome: Ongoing (interventions implemented as appropriate)  Plan of care reviewed with pt. Pt AAOx's4, vital signs stable. Curently on 2l per nasal canula for comfort. Chest tube site dry and intact ton water seal. Air leak present. Few complaints of pain relieved with prn meds. Pt ambulates with assistance and remains free from falls. Bed in low and halie position with call light in reach. TM

## 2018-04-18 NOTE — PROGRESS NOTES
[facility]  Thoracic Surgery  Progress Note    Subjective:     History of Present Illness:  68 y/o male former smoker with AAA s/p PTA and stenting of bilateral iliacs, diverticulitis, HTN, tobacco abuse, and newly diagnosed MDS presenting for incidentally found 2.0 x 0.8cm spiculated nodule in DEIRDRE apex. Nodule likely dating back to 2016 with an increasing solid component. Recent hospital admission for asymptomatic anemia and now follows with hematology for MDS. Hematology would like treatment for lung nodule prior to treatment for MDS. Recommendations: leukoreduced, irradiated PRBC for hemoglobin < 7, Irradiated platelets for platelet count < 12696. He is using a walker today but reports left knee pain starting this weekend. Otherwise, he does not typically need a walker. Endorses decreased appetite and fatigue. Denies fever, chills, CP, SOB, nausea, vomiting. Denies prior cardiac history. Not on blood thinners.      Former smoker. Quit smoking Dec '17. 45 pack year history.   PSH: partial colectomy, colostomy and reversal, appendectomy, AAA endovascular repair       Post-Op Info:  Procedure(s) (LRB):  THORACOSCOPY-VIDEO ASSISTED (VATS) W/WEDGE LUNG RESECTION (Left)   1 Day Post-Op     Interval History: NAEON. Tolerating diet. Pain well controlled. Chest tube with continuous air leak on suction.     Medications:  Continuous Infusions:  Scheduled Meds:   albuterol-ipratropium 2.5mg-0.5mg/3mL  3 mL Nebulization Q6H    famotidine (PF)  20 mg Intravenous Q12H    senna-docusate 8.6-50 mg  1 tablet Oral BID     PRN Meds:acetaminophen, bisacodyl, hydrocodone-acetaminophen 10-325mg, hydrocodone-acetaminophen 5-325mg, HYDROmorphone, lactulose, promethazine (PHENERGAN) IVPB     Review of patient's allergies indicates:  No Known Allergies  Objective:     Vital Signs (Most Recent):  Temp: 98.2 °F (36.8 °C) (04/18/18 0728)  Pulse: 87 (04/18/18 0741)  Resp: 18 (04/18/18 0741)  BP: (!) 125/59 (04/18/18  0728)  SpO2: (!) 92 % (04/18/18 0741) Vital Signs (24h Range):  Temp:  [97.5 °F (36.4 °C)-98.3 °F (36.8 °C)] 98.2 °F (36.8 °C)  Pulse:  [82-98] 87  Resp:  [16-28] 18  SpO2:  [91 %-100 %] 92 %  BP: (102-140)/(55-75) 125/59     Intake/Output - Last 3 Shifts       04/16 0700 - 04/17 0659 04/17 0700 - 04/18 0659 04/18 0700 - 04/19 0659    P.O.  360     I.V. (mL/kg)  1200 (18.8)     Blood  218     Total Intake(mL/kg)  1778 (27.8)     Urine (mL/kg/hr)  550     Chest Tube  55     Total Output   605      Net   +1173                   SpO2: (!) 92 %  O2 Device (Oxygen Therapy): nasal cannula    Physical Exam   Constitutional: He is oriented to person, place, and time. He appears well-developed and well-nourished.   HENT:   Head: Normocephalic.   Eyes: Pupils are equal, round, and reactive to light.   Neck: Normal range of motion. Neck supple.   Cardiovascular: Normal rate, regular rhythm, normal heart sounds and intact distal pulses.    Pulmonary/Chest: Effort normal and breath sounds normal.   Chest tube site c/d/i. Minimal output. Airleak on suction.   Abdominal: Soft. Bowel sounds are normal. He exhibits no distension.   Musculoskeletal: Normal range of motion. He exhibits no edema.   Lymphadenopathy:     He has no cervical adenopathy.   Neurological: He is alert and oriented to person, place, and time.   Skin: Skin is warm.   Vitals reviewed.      Significant Labs:  BMP:   Recent Labs  Lab 04/18/18 0447      *   K 4.2      CO2 23   BUN 17   CREATININE 0.7   CALCIUM 8.8   MG 1.8     CBC:   Recent Labs  Lab 04/18/18 0447   WBC 4.19   RBC 2.44*   HGB 7.8*   HCT 23.6*   PLT 53*   MCV 97   MCH 32.0*   MCHC 33.1     CMP:   Recent Labs  Lab 04/18/18 0447      CALCIUM 8.8   *   K 4.2   CO2 23      BUN 17   CREATININE 0.7     Coagulation: No results for input(s): PT, INR, APTT in the last 48 hours.    Significant Diagnostics:  CXR: I have reviewed all pertinent results/findings within the  past 24 hours    VTE Risk Mitigation         Ordered     IP VTE HIGH RISK PATIENT  Once      04/17/18 1536     Place sequential compression device  Until discontinued      04/17/18 1536     Place MICHELINE hose  Until discontinued      04/17/18 1536     Reason for No Pharmacological VTE Prophylaxis  Once      04/17/18 1536        Assessment/Plan:     * Lung nodule    67 year old male POD1 s/p left VATS for left upper lobe wedge     - Regular diet  - Chest tube to water seal  - Wean NC O2 for saturations above 90%  - Pulmonary toilet -IS/Duonebs  - Gentle diuresis- Lasix 20mg.  - Mechanical DVT prophylaxis           MDS (myelodysplastic syndrome)    Per Hematology, leukoreduced, irradiated PRBC for hemoglobin < 7. Irradiated platelets for platelet count < 06777.   Will monitor with daily CBC        Essential hypertension    Restart home meds.             NICOLAS Aragon  Thoracic Surgery  [facility]

## 2018-04-19 VITALS
SYSTOLIC BLOOD PRESSURE: 112 MMHG | RESPIRATION RATE: 16 BRPM | TEMPERATURE: 98 F | OXYGEN SATURATION: 93 % | HEIGHT: 69 IN | HEART RATE: 88 BPM | BODY MASS INDEX: 20.88 KG/M2 | DIASTOLIC BLOOD PRESSURE: 62 MMHG | WEIGHT: 141 LBS

## 2018-04-19 DIAGNOSIS — R91.1 LUNG NODULE: Primary | ICD-10-CM

## 2018-04-19 LAB
ANION GAP SERPL CALC-SCNC: 7 MMOL/L
ANISOCYTOSIS BLD QL SMEAR: ABNORMAL
BASOPHILS # BLD AUTO: 0.02 K/UL
BASOPHILS NFR BLD: 0.3 %
BUN SERPL-MCNC: 11 MG/DL
BURR CELLS BLD QL SMEAR: ABNORMAL
CALCIUM SERPL-MCNC: 8.5 MG/DL
CHLORIDE SERPL-SCNC: 95 MMOL/L
CO2 SERPL-SCNC: 25 MMOL/L
CREAT SERPL-MCNC: 0.6 MG/DL
DACRYOCYTES BLD QL SMEAR: ABNORMAL
DIFFERENTIAL METHOD: ABNORMAL
EOSINOPHIL # BLD AUTO: 0.1 K/UL
EOSINOPHIL NFR BLD: 0.8 %
ERYTHROCYTE [DISTWIDTH] IN BLOOD BY AUTOMATED COUNT: 20.9 %
EST. GFR  (AFRICAN AMERICAN): >60 ML/MIN/1.73 M^2
EST. GFR  (NON AFRICAN AMERICAN): >60 ML/MIN/1.73 M^2
GLUCOSE SERPL-MCNC: 133 MG/DL
HCT VFR BLD AUTO: 23.4 %
HGB BLD-MCNC: 7.8 G/DL
HYPOCHROMIA BLD QL SMEAR: ABNORMAL
IMM GRANULOCYTES # BLD AUTO: 0.09 K/UL
IMM GRANULOCYTES NFR BLD AUTO: 1.5 %
LYMPHOCYTES # BLD AUTO: 1 K/UL
LYMPHOCYTES NFR BLD: 16.6 %
MAGNESIUM SERPL-MCNC: 1.7 MG/DL
MCH RBC QN AUTO: 32.1 PG
MCHC RBC AUTO-ENTMCNC: 33.3 G/DL
MCV RBC AUTO: 96 FL
MONOCYTES # BLD AUTO: 1.9 K/UL
MONOCYTES NFR BLD: 29.8 %
NEUTROPHILS # BLD AUTO: 3.2 K/UL
NEUTROPHILS NFR BLD: 51 %
NRBC BLD-RTO: 0 /100 WBC
OVALOCYTES BLD QL SMEAR: ABNORMAL
PHOSPHATE SERPL-MCNC: 2.9 MG/DL
PLATELET # BLD AUTO: 41 K/UL
PLATELET BLD QL SMEAR: ABNORMAL
PMV BLD AUTO: ABNORMAL FL
POIKILOCYTOSIS BLD QL SMEAR: SLIGHT
POLYCHROMASIA BLD QL SMEAR: ABNORMAL
POTASSIUM SERPL-SCNC: 4.3 MMOL/L
RBC # BLD AUTO: 2.43 M/UL
SCHISTOCYTES BLD QL SMEAR: ABNORMAL
SODIUM SERPL-SCNC: 127 MMOL/L
WBC # BLD AUTO: 6.2 K/UL

## 2018-04-19 PROCEDURE — 36415 COLL VENOUS BLD VENIPUNCTURE: CPT

## 2018-04-19 PROCEDURE — 94761 N-INVAS EAR/PLS OXIMETRY MLT: CPT

## 2018-04-19 PROCEDURE — 27000221 HC OXYGEN, UP TO 24 HOURS

## 2018-04-19 PROCEDURE — 84100 ASSAY OF PHOSPHORUS: CPT

## 2018-04-19 PROCEDURE — 25000242 PHARM REV CODE 250 ALT 637 W/ HCPCS: Performed by: THORACIC SURGERY (CARDIOTHORACIC VASCULAR SURGERY)

## 2018-04-19 PROCEDURE — 25000003 PHARM REV CODE 250: Performed by: THORACIC SURGERY (CARDIOTHORACIC VASCULAR SURGERY)

## 2018-04-19 PROCEDURE — 83735 ASSAY OF MAGNESIUM: CPT

## 2018-04-19 PROCEDURE — 85025 COMPLETE CBC W/AUTO DIFF WBC: CPT

## 2018-04-19 PROCEDURE — 94640 AIRWAY INHALATION TREATMENT: CPT

## 2018-04-19 PROCEDURE — 80048 BASIC METABOLIC PNL TOTAL CA: CPT

## 2018-04-19 PROCEDURE — 63600175 PHARM REV CODE 636 W HCPCS: Performed by: STUDENT IN AN ORGANIZED HEALTH CARE EDUCATION/TRAINING PROGRAM

## 2018-04-19 PROCEDURE — S0028 INJECTION, FAMOTIDINE, 20 MG: HCPCS | Performed by: THORACIC SURGERY (CARDIOTHORACIC VASCULAR SURGERY)

## 2018-04-19 RX ORDER — OXYCODONE AND ACETAMINOPHEN 5; 325 MG/1; MG/1
1 TABLET ORAL EVERY 4 HOURS PRN
Qty: 41 TABLET | Refills: 0 | Status: SHIPPED | OUTPATIENT
Start: 2018-04-19 | End: 2018-05-11 | Stop reason: SDUPTHER

## 2018-04-19 RX ADMIN — IPRATROPIUM BROMIDE AND ALBUTEROL SULFATE 3 ML: .5; 3 SOLUTION RESPIRATORY (INHALATION) at 12:04

## 2018-04-19 RX ADMIN — IPRATROPIUM BROMIDE AND ALBUTEROL SULFATE 3 ML: .5; 3 SOLUTION RESPIRATORY (INHALATION) at 01:04

## 2018-04-19 RX ADMIN — HYDROCODONE BITARTRATE AND ACETAMINOPHEN 1 TABLET: 10; 325 TABLET ORAL at 04:04

## 2018-04-19 RX ADMIN — FAMOTIDINE 20 MG: 10 INJECTION INTRAVENOUS at 08:04

## 2018-04-19 RX ADMIN — IPRATROPIUM BROMIDE AND ALBUTEROL SULFATE 3 ML: .5; 3 SOLUTION RESPIRATORY (INHALATION) at 07:04

## 2018-04-19 RX ADMIN — HYDROCODONE BITARTRATE AND ACETAMINOPHEN 1 TABLET: 10; 325 TABLET ORAL at 12:04

## 2018-04-19 RX ADMIN — HYDROMORPHONE HYDROCHLORIDE 0.5 MG: 1 INJECTION, SOLUTION INTRAMUSCULAR; INTRAVENOUS; SUBCUTANEOUS at 06:04

## 2018-04-19 NOTE — ASSESSMENT & PLAN NOTE
67 year old male POD1 s/p left VATS for left upper lobe wedge     - Regular diet  - Chest tube clamped this morning. Repeat CXR at 1000  - Wean NC O2 for saturations above 90%.   - Will hold on further diuresis.   - Hyponatremia- 127 today. Repeat BMP this afternoon.   - Pulmonary toilet -IS/Duonebs  - Mechanical DVT prophylaxis

## 2018-04-19 NOTE — SUBJECTIVE & OBJECTIVE
Interval History: NAEON. Tolerating diet. Pain well controlled. Chest tube to water seal yesterday. Comfortable on 2LNC 93%. UOP responded to diuresis.     Medications:  Continuous Infusions:  Scheduled Meds:   albuterol-ipratropium 2.5mg-0.5mg/3mL  3 mL Nebulization Q6H    famotidine (PF)  20 mg Intravenous Q12H    senna-docusate 8.6-50 mg  1 tablet Oral BID     PRN Meds:acetaminophen, bisacodyl, hydrocodone-acetaminophen 10-325mg, hydrocodone-acetaminophen 5-325mg, HYDROmorphone, lactulose, promethazine (PHENERGAN) IVPB     Review of patient's allergies indicates:  No Known Allergies  Objective:     Vital Signs (Most Recent):  Temp: 98.3 °F (36.8 °C) (04/19/18 0712)  Pulse: 85 (04/19/18 0715)  Resp: 16 (04/19/18 0715)  BP: 132/60 (04/19/18 0712)  SpO2: (!) 93 % (04/19/18 0715) Vital Signs (24h Range):  Temp:  [97.7 °F (36.5 °C)-99.2 °F (37.3 °C)] 98.3 °F (36.8 °C)  Pulse:  [85-99] 85  Resp:  [12-18] 16  SpO2:  [90 %-99 %] 93 %  BP: (116-134)/(58-63) 132/60     Intake/Output - Last 3 Shifts       04/17 0700 - 04/18 0659 04/18 0700 - 04/19 0659 04/19 0700 - 04/20 0659    P.O. 360 360     I.V. (mL/kg) 1200 (18.8)      Blood 218      Total Intake(mL/kg) 1778 (27.8) 360 (5.6)     Urine (mL/kg/hr) 550 1370 (0.9)     Stool  0 (0)     Chest Tube 55 95 (0.1)     Total Output 605 1465      Net +1173 -1105             Stool Occurrence  0 x 0 x          SpO2: (!) 93 %  O2 Device (Oxygen Therapy): nasal cannula    Physical Exam   Constitutional: He is oriented to person, place, and time. He appears well-developed and well-nourished.   HENT:   Head: Normocephalic.   Eyes: Pupils are equal, round, and reactive to light.   Neck: Normal range of motion. Neck supple.   Cardiovascular: Normal rate, regular rhythm, normal heart sounds and intact distal pulses.    Pulmonary/Chest: Effort normal and breath sounds normal.   Chest tube site c/d/i. Minimal output. No air leak.    Abdominal: Soft. Bowel sounds are normal. He exhibits no  distension.   Musculoskeletal: Normal range of motion. He exhibits no edema.   Lymphadenopathy:     He has no cervical adenopathy.   Neurological: He is alert and oriented to person, place, and time.   Skin: Skin is warm.   Vitals reviewed.      Significant Labs:  CBC:   Recent Labs  Lab 04/18/18  0447   WBC 4.19   RBC 2.44*   HGB 7.8*   HCT 23.6*   PLT 53*   MCV 97   MCH 32.0*   MCHC 33.1     CMP:   Recent Labs  Lab 04/19/18  0504   *   CALCIUM 8.5*   *   K 4.3   CO2 25   CL 95   BUN 11   CREATININE 0.6     Coagulation: No results for input(s): PT, INR, APTT in the last 48 hours.    Significant Diagnostics:  CXR: I have reviewed all pertinent results/findings within the past 24 hours    VTE Risk Mitigation         Ordered     IP VTE HIGH RISK PATIENT  Once      04/17/18 1536     Place sequential compression device  Until discontinued      04/17/18 1536     Place MICHELINE hose  Until discontinued      04/17/18 1536     Reason for No Pharmacological VTE Prophylaxis  Once      04/17/18 1536

## 2018-04-19 NOTE — PROGRESS NOTES
[facility]  Thoracic Surgery  Progress Note    Subjective:     History of Present Illness:  68 y/o male former smoker with AAA s/p PTA and stenting of bilateral iliacs, diverticulitis, HTN, tobacco abuse, and newly diagnosed MDS presenting for incidentally found 2.0 x 0.8cm spiculated nodule in DEIRDRE apex. Nodule likely dating back to 2016 with an increasing solid component. Recent hospital admission for asymptomatic anemia and now follows with hematology for MDS. Hematology would like treatment for lung nodule prior to treatment for MDS. Recommendations: leukoreduced, irradiated PRBC for hemoglobin < 7, Irradiated platelets for platelet count < 32367. He is using a walker today but reports left knee pain starting this weekend. Otherwise, he does not typically need a walker. Endorses decreased appetite and fatigue. Denies fever, chills, CP, SOB, nausea, vomiting. Denies prior cardiac history. Not on blood thinners.      Former smoker. Quit smoking Dec '17. 45 pack year history.   PSH: partial colectomy, colostomy and reversal, appendectomy, AAA endovascular repair       Post-Op Info:  Procedure(s) (LRB):  THORACOSCOPY-VIDEO ASSISTED (VATS) W/WEDGE LUNG RESECTION (Left)   2 Days Post-Op     Interval History: NAEON. Tolerating diet. Pain well controlled. Chest tube to water seal yesterday. Comfortable on 2LNC 93%. UOP responded to diuresis.     Medications:  Continuous Infusions:  Scheduled Meds:   albuterol-ipratropium 2.5mg-0.5mg/3mL  3 mL Nebulization Q6H    famotidine (PF)  20 mg Intravenous Q12H    senna-docusate 8.6-50 mg  1 tablet Oral BID     PRN Meds:acetaminophen, bisacodyl, hydrocodone-acetaminophen 10-325mg, hydrocodone-acetaminophen 5-325mg, HYDROmorphone, lactulose, promethazine (PHENERGAN) IVPB     Review of patient's allergies indicates:  No Known Allergies  Objective:     Vital Signs (Most Recent):  Temp: 98.3 °F (36.8 °C) (04/19/18 0712)  Pulse: 85 (04/19/18 0715)  Resp: 16  (04/19/18 0715)  BP: 132/60 (04/19/18 0712)  SpO2: (!) 93 % (04/19/18 0715) Vital Signs (24h Range):  Temp:  [97.7 °F (36.5 °C)-99.2 °F (37.3 °C)] 98.3 °F (36.8 °C)  Pulse:  [85-99] 85  Resp:  [12-18] 16  SpO2:  [90 %-99 %] 93 %  BP: (116-134)/(58-63) 132/60     Intake/Output - Last 3 Shifts       04/17 0700 - 04/18 0659 04/18 0700 - 04/19 0659 04/19 0700 - 04/20 0659    P.O. 360 360     I.V. (mL/kg) 1200 (18.8)      Blood 218      Total Intake(mL/kg) 1778 (27.8) 360 (5.6)     Urine (mL/kg/hr) 550 1370 (0.9)     Stool  0 (0)     Chest Tube 55 95 (0.1)     Total Output 605 1465      Net +1173 -1105             Stool Occurrence  0 x 0 x          SpO2: (!) 93 %  O2 Device (Oxygen Therapy): nasal cannula    Physical Exam   Constitutional: He is oriented to person, place, and time. He appears well-developed and well-nourished.   HENT:   Head: Normocephalic.   Eyes: Pupils are equal, round, and reactive to light.   Neck: Normal range of motion. Neck supple.   Cardiovascular: Normal rate, regular rhythm, normal heart sounds and intact distal pulses.    Pulmonary/Chest: Effort normal and breath sounds normal.   Chest tube site c/d/i. Minimal output. No air leak.    Abdominal: Soft. Bowel sounds are normal. He exhibits no distension.   Musculoskeletal: Normal range of motion. He exhibits no edema.   Lymphadenopathy:     He has no cervical adenopathy.   Neurological: He is alert and oriented to person, place, and time.   Skin: Skin is warm.   Vitals reviewed.      Significant Labs:  CBC:   Recent Labs  Lab 04/18/18  0447   WBC 4.19   RBC 2.44*   HGB 7.8*   HCT 23.6*   PLT 53*   MCV 97   MCH 32.0*   MCHC 33.1     CMP:   Recent Labs  Lab 04/19/18  0504   *   CALCIUM 8.5*   *   K 4.3   CO2 25   CL 95   BUN 11   CREATININE 0.6     Coagulation: No results for input(s): PT, INR, APTT in the last 48 hours.    Significant Diagnostics:  CXR: I have reviewed all pertinent results/findings within the past 24 hours    VTE  Risk Mitigation         Ordered     IP VTE HIGH RISK PATIENT  Once      04/17/18 1536     Place sequential compression device  Until discontinued      04/17/18 1536     Place MICHELINE hose  Until discontinued      04/17/18 1536     Reason for No Pharmacological VTE Prophylaxis  Once      04/17/18 1536        Assessment/Plan:     * Lung nodule    67 year old male POD1 s/p left VATS for left upper lobe wedge     - Regular diet  - Chest tube clamped this morning. Repeat CXR at 1000  - Wean NC O2 for saturations above 90%.   - Will hold on further diuresis.   - Hyponatremia- 127 today. Repeat BMP this afternoon.   - Pulmonary toilet -IS/Duonebs  - Mechanical DVT prophylaxis           MDS (myelodysplastic syndrome)    Per Hematology, leukoreduced, irradiated PRBC for hemoglobin < 7. Irradiated platelets for platelet count < 80961.   Will monitor with daily CBC        Essential hypertension    Restart home meds.             NICOLAS Aragon  Thoracic Surgery  [facility]

## 2018-04-19 NOTE — DISCHARGE SUMMARY
[facility]  Thoracic Surgery  Discharge Summary    Patient Name: Glen Arroyo  MRN: [id number]  Admission Date: 4/17/2018  Hospital Length of Stay: 2 days  Discharge Date and Time: 4/19/2018  3:28 PM  Attending Physician: No att. providers found   Discharging Provider: NICOLAS Aragon  Primary Care Provider: José Marquez MD    HPI:   68 y/o male former smoker with AAA s/p PTA and stenting of bilateral iliacs, diverticulitis, HTN, tobacco abuse, and newly diagnosed MDS presenting for incidentally found 2.0 x 0.8cm spiculated nodule in DEIRDRE apex. Nodule likely dating back to 2016 with an increasing solid component. Recent hospital admission for asymptomatic anemia and now follows with hematology for MDS. Hematology would like treatment for lung nodule prior to treatment for MDS. Recommendations: leukoreduced, irradiated PRBC for hemoglobin < 7, Irradiated platelets for platelet count < 56753. He is using a walker today but reports left knee pain starting this weekend. Otherwise, he does not typically need a walker. Endorses decreased appetite and fatigue. Denies fever, chills, CP, SOB, nausea, vomiting. Denies prior cardiac history. Not on blood thinners.      Former smoker. Quit smoking Dec '17. 45 pack year history.   PSH: partial colectomy, colostomy and reversal, appendectomy, AAA endovascular repair       Procedure(s) (LRB):  THORACOSCOPY-VIDEO ASSISTED (VATS) W/WEDGE LUNG RESECTION (Left)      Hospital Course: 4/17/18- Left VATS for left upper lobe wedge  4/18/18- Chest tube to water seal. Ambulate. Pulmonary toilet.   4/19/18- Chest tube removed. Repeat CXR stable. Supplemental O2 weaned. Tolerating diet. Pain well controlled.         Significant Diagnostic Studies: Labs:   BMP:   Recent Labs  Lab 04/18/18  0447 04/19/18  0504    133*   * 127*   K 4.2 4.3    95   CO2 23 25   BUN 17 11   CREATININE 0.7 0.6   CALCIUM 8.8 8.5*   MG 1.8 1.7   , CMP   Recent  Labs  Lab 04/18/18  0447 04/19/18  0504   * 127*   K 4.2 4.3    95   CO2 23 25    133*   BUN 17 11   CREATININE 0.7 0.6   CALCIUM 8.8 8.5*   ANIONGAP 8 7*   ESTGFRAFRICA >60.0 >60.0   EGFRNONAA >60.0 >60.0    and CBC   Recent Labs  Lab 04/18/18  0447 04/19/18  0504   WBC 4.19 6.20   HGB 7.8* 7.8*   HCT 23.6* 23.4*   PLT 53* 41*     Radiology: X-Ray: CXR: X-Ray Chest 1 View (CXR):   Results for orders placed or performed during the hospital encounter of 04/17/18   X-Ray Chest 1 View    Narrative    EXAMINATION:  XR CHEST 1 VIEW    CLINICAL HISTORY:  chest tube removal;    COMPARISON:  Comparison is made to 04/19/2018 at 9:58 a.m..    FINDINGS:  Previously present left chest tube has been removed.  A tiny left apical pneumothorax is now observed, although it is of inconsequential volume at present.  The appearance of the chest is otherwise unchanged since the examination noted above.      Impression    Tiny left pneumothorax following left chest tube removal.      Electronically signed by: Adair Rouse MD  Date:    04/19/2018  Time:    14:24    and X-Ray Chest PA and Lateral (CXR): No results found for this visit on 04/17/18.    Pending Diagnostic Studies:     None        Final Active Diagnoses:    Diagnosis Date Noted POA    PRINCIPAL PROBLEM:  Lung nodule [R91.1] 04/17/2018 Yes    MDS (myelodysplastic syndrome) [D46.9] 03/21/2018 Yes    Essential hypertension [I10] 03/24/2017 Yes      Problems Resolved During this Admission:    Diagnosis Date Noted Date Resolved POA      Discharged Condition: good    Disposition: Home or Self Care    Follow Up:  Follow-up Information     Clint Machado MD In 2 weeks.    Specialty:  Cardiothoracic Surgery  Contact information:  [address]  [facility] [address] [phone]                 Patient Instructions:     Diet Adult Regular     Activity as tolerated     Lifting restrictions   Order Comments: No lifting greater than 20 pounds for 6 weeks.      Shower on day dressing removed (No bath)     Notify your health care provider if you experience any of the following:  severe persistent headache     Notify your health care provider if you experience any of the following:  temperature >100.4     Notify your health care provider if you experience any of the following:  persistent nausea and vomiting or diarrhea     Notify your health care provider if you experience any of the following:  severe uncontrolled pain     Notify your health care provider if you experience any of the following:  redness, tenderness, or signs of infection (pain, swelling, redness, odor or green/yellow discharge around incision site)     Notify your health care provider if you experience any of the following:  difficulty breathing or increased cough     Notify your health care provider if you experience any of the following:  worsening rash     Notify your health care provider if you experience any of the following:  persistent dizziness, light-headedness, or visual disturbances     Notify your health care provider if you experience any of the following:  increased confusion or weakness     Remove dressing in 24 hours       Medications:  Reconciled Home Medications:      Medication List      START taking these medications    oxyCODONE-acetaminophen 5-325 mg per tablet  Commonly known as:  PERCOCET  Take 1 tablet by mouth every 4 (four) hours as needed for Pain (Pain related to surgery and cancer diagnosis).        CONTINUE taking these medications    acetaminophen 500 MG tablet  Commonly known as:  TYLENOL  Take 500 mg by mouth every 6 (six) hours as needed for Pain.     folic acid 800 MCG Tab  Commonly known as:  FOLVITE  Take 800 mcg by mouth once daily.     losartan-hydrochlorothiazide 50-12.5 mg 50-12.5 mg per tablet  Commonly known as:  HYZAAR  Take 1 tablet by mouth every morning.     VITAMIN B-12 100 MCG tablet  Generic drug:  cyanocobalamin  Take 100 mcg by mouth once daily.      zinc gluconate 50 mg tablet  Take 50 mg by mouth once daily.            NICOLAS Aragon  Thoracic Surgery  [facility]

## 2018-04-19 NOTE — PLAN OF CARE
Problem: Patient Care Overview  Goal: Plan of Care Review  Outcome: Ongoing (interventions implemented as appropriate)  POC reviewed with patient who verbalized understanding. AAOx4. VSS on 2L Nc. Left lateral lap sites x2 intact with dressings in place. Left lateral chest tube in place to water seal, air leak present and subcutaneous emphysema to left anterior chest wall, MD aware. Dried drainage noted to occlusive dressing chest tube insertion site. Pain controlled with PRN medications per MAR. Tolerating PO fluids, voiding per urinal. Tolerating regular diet, denies any nausea. BLE TEDS/SCDS in place. Safety precautions maintained, call light within reach. Remains free from falls and injury. No acute events. No distress noted. Will continue to monitor.

## 2018-04-20 ENCOUNTER — TELEPHONE (OUTPATIENT)
Dept: CARDIOTHORACIC SURGERY | Facility: CLINIC | Age: 68
End: 2018-04-20

## 2018-04-20 NOTE — TELEPHONE ENCOUNTER
Called to check on pt after recent surgery and hosptial  Discharge.  Report pain scale a 5-7 on a 10 scale.  Instructed to take aleve or ibuprofen in between taking pain medication for added relief of pain.  Verbalized understanding.  Reviewed post op appointment date and times, instructed to remove bandages today and shower. Discharge instructions reviewed again to pt by NICOLAS Garay who re-instructed on dressing care.

## 2018-04-27 NOTE — PHYSICIAN QUERY
PT Name: Glen Arroyo  MR #: [id number]    Physician Query Form - Pathology Findings Clarification     CDS/: Charlette Card               Contact information:[email or web address]  This form is a permanent document in the medical record.     Query Date: April 27, 2018      By submitting this query, we are merely seeking further clarification of documentation.  Please utilize your independent clinical judgment when addressing the question(s) below.      The medical record contains the following:     Findings Supporting Clinical Information Location in Medical Record   - Invasive adenocarcinoma, acinar predominant           This is a 67 year-old gentleman with a history of newly diagnosed myelodysplastic syndrome (MDS).  He was also found to have a 2.0 x 0.8cm spiculated nodule in the left upper lobe.  The nodule was first seen in 2016 and now had an increasing solid component Path report 4/17          Op Note 4/17     Please document the clinical significance of the Pathologists findings of ___Invasive adenocarcinoma, acinar predominant___.          [ X] I agree with the Pathology Findings        [  ] I do not agree with the Pathology Findings        [  ] Clinically Insignificant        [  ] Clinically Undetermined        [  ] Other/Clarification of Findings: ______________________________________________    Please document in your progress notes daily for the duration of treatment until resolved and include in your discharge summary.

## 2018-05-02 ENCOUNTER — DOCUMENTATION ONLY (OUTPATIENT)
Dept: CARDIOTHORACIC SURGERY | Facility: HOSPITAL | Age: 68
End: 2018-05-02

## 2018-05-02 NOTE — PATIENT CARE CONFERENCE
[facility]      THORACIC MULTIDISCIPLINARY TUMOR BOARD  PATIENT REVIEW FORM  ________________________________________________________________________    CLINIC #: [id number]  DATE: 05/02/2018    DIAGNOSIS:  Left upper lobe adenocarcinoma in setting of myelodysplastic syndrome     PRESENTER: Dr. Machado    PATIENT SUMMARY:   67 year old male with PMH of AAA s/p PTA and stenting of bilateral iliacs, diverticulitis, HTN, tobacco abuse, and newly diagnosed MDS presented to thoracic surgery for incidentally found 2.0 x 0.8cm spiculated nodule in DEIRDRE apex. Underwent a left VATS and left upper lobe wedge on 4/17/18. Due to transfusion requirements and underlying co-morbidities, lobar resection was not performed. Pathology positive for 1.4cm invasive adenocarcinoma, acinar predominant. 3mm margin. Visceral pleural invasion present.     BOARD RECOMMENDATIONS: Recommend proceeding with MDS treatment and routine lung cancer surveillance. Agree with wedge resection as the patient's MDS requires relatively immediate treatment.  Lobectomy may have resulted in a prolonged recovery time.  Monitor prevascular LN and consider EBRT if signs of LN progression.    CONSULT NEEDED:     [] Surgery    [x] Hem/Onc    [] Rad/Onc    [] Dietary                 [] Social Service    [] Psychology       [] Pulmonology    Clinical Stage: Tumor  Node(s)  Metastasis   Pathologic Stage: Tumor- T2  Node(s)- X  Metastasis- X    GROUP STAGE:     [] O    [] 1A    [x] IB    [] IIA    [] IIB     [] IIIA     [] IIIB     [] IIIC    [] IV                               [] Local recurrence     [] Regional recurrence     [] Distant recurrence                   [x] NSCLC     [] SCLC     Tumor type- Adenocarcinoma      Unstageable:      [] Yes     [] No  Metastatic site(s):          [x] [place] Treatment Guidelines reviewed and care planned is consistent with guidelines.         (i.e., NCCN, NCI, PD, ACO, AUA, etc.)    PRESENTATION AT CANCER CONFERENCE:          [] Prospective    [] Retrospective     [] Follow-Up          [] Eligible for clinical trial

## 2018-05-04 ENCOUNTER — OFFICE VISIT (OUTPATIENT)
Dept: CARDIOTHORACIC SURGERY | Facility: CLINIC | Age: 68
End: 2018-05-04
Payer: MEDICARE

## 2018-05-04 ENCOUNTER — HOSPITAL ENCOUNTER (OUTPATIENT)
Dept: RADIOLOGY | Facility: HOSPITAL | Age: 68
Discharge: HOME | End: 2018-05-04
Payer: MEDICARE

## 2018-05-04 VITALS
HEIGHT: 69 IN | SYSTOLIC BLOOD PRESSURE: 89 MMHG | OXYGEN SATURATION: 99 % | BODY MASS INDEX: 19.56 KG/M2 | DIASTOLIC BLOOD PRESSURE: 60 MMHG | WEIGHT: 132.06 LBS

## 2018-05-04 DIAGNOSIS — C34.12 MALIGNANT NEOPLASM OF UPPER LOBE OF LEFT LUNG: ICD-10-CM

## 2018-05-04 DIAGNOSIS — R91.1 PULMONARY NODULE: Primary | ICD-10-CM

## 2018-05-04 DIAGNOSIS — R91.1 LUNG NODULE: ICD-10-CM

## 2018-05-04 PROCEDURE — 99999 PR PBB SHADOW E&M-EST. PATIENT-LVL III: CPT | Mod: PBBFAC,,, | Performed by: THORACIC SURGERY (CARDIOTHORACIC VASCULAR SURGERY)

## 2018-05-04 PROCEDURE — 99024 POSTOP FOLLOW-UP VISIT: CPT | Mod: S$GLB,,, | Performed by: THORACIC SURGERY (CARDIOTHORACIC VASCULAR SURGERY)

## 2018-05-04 PROCEDURE — 71046 X-RAY EXAM CHEST 2 VIEWS: CPT | Mod: TC,FY

## 2018-05-04 PROCEDURE — 71046 X-RAY EXAM CHEST 2 VIEWS: CPT | Mod: 26,,, | Performed by: RADIOLOGY

## 2018-05-04 NOTE — PROGRESS NOTES
"Subjective:       Patient ID: Glen Arroyo is a 67 y.o. male.    Chief Complaint: Post-op Evaluation    Diagnosis:  MDS, NSCLC - adenocarcinoma    Pre-operative therapy: none    Procedure(s) and date(s): 1. 4/17/18 - Left VATS DEIRDRE wedge resection    Pathology: 1. 1.4cm moderately differentiated invasive adenocarcinoma, VPI present, LVI present, 3mm parenchyml margin, pT2NX     Post-operative therapy: treatment for MDS, surveillance of NSCLC    HPI   66 y/o male former smoker with AAA s/p PTA and stenting of bilateral iliacs, diverticulitis, HTN, tobacco abuse, newly diagnosed MDS, and DEIRDRE nodule s/p left VATS DEIRDRE wedge resection here today for 2 week f/u. Pathology pT2aNx. Hospital course uncomplicated. Received platelets perioperative.        Review of Systems      Objective:       Vitals:    05/04/18 0807   BP: (!) 89/60   SpO2: 99%   Weight: 59.9 kg (132 lb 0.9 oz)   Height: 5' 9" (1.753 m)   PainSc: 0-No pain       Physical Exam      Pathology:   DEIRDRE wedge resection   1.4 cm in greatest dimension Invasive adenocarcinoma, acinar predominant; moderately differentiated  Visceral Pleura Invasion: Present  Lymphovascular Invasion: Present  Margins: All margins are uninvolved by carcinoma  Distance of invasive carcinoma from closest margin: 3 mm from the parenchymal margin  Pathologic Stage Classification (pTNM, AJCC 8th Edition): p T2 NX    CXR 5/4/18:  Modest postoperative changes described in the left hemithorax of this patient status post sub lobar resection in the left upper lobe.  No unusual postoperative finding or new disease detected.    Assessment:       66 y/o male former smoker with AAA s/p PTA and stenting of bilateral iliacs, diverticulitis, HTN, tobacco abuse, newly diagnosed MDS, and DEIRDRE nodule s/p left VATS DEIRDRE wedge resection here today for 2 week f/u. Pathology (pT2aNx) reviewed with patient. Recommendation of Thoracic Tumor Board reviewed with patient.      Plan:       Refer back to Dr. Truong " for treatment of MDS.   Repeat Chest CT in 4 months for surveillance of NSCLC.     ATTENDING ATTESTATION:    I evaluated the patient and I agree with the assessment and plan.  The patient's case was discussed at multidisciplinary lung conference and the current management deemed appropriate.

## 2018-05-09 ENCOUNTER — LAB VISIT (OUTPATIENT)
Dept: LAB | Facility: HOSPITAL | Age: 68
End: 2018-05-09
Attending: INTERNAL MEDICINE
Payer: MEDICARE

## 2018-05-09 ENCOUNTER — OFFICE VISIT (OUTPATIENT)
Dept: HEMATOLOGY/ONCOLOGY | Facility: CLINIC | Age: 68
End: 2018-05-09
Payer: MEDICARE

## 2018-05-09 VITALS
SYSTOLIC BLOOD PRESSURE: 121 MMHG | HEIGHT: 69 IN | WEIGHT: 132.5 LBS | BODY MASS INDEX: 19.62 KG/M2 | DIASTOLIC BLOOD PRESSURE: 59 MMHG | OXYGEN SATURATION: 97 % | HEART RATE: 76 BPM

## 2018-05-09 DIAGNOSIS — D46.9 MDS (MYELODYSPLASTIC SYNDROME): Primary | ICD-10-CM

## 2018-05-09 DIAGNOSIS — C34.92 ADENOCARCINOMA, LUNG, LEFT: ICD-10-CM

## 2018-05-09 DIAGNOSIS — D46.9 MDS (MYELODYSPLASTIC SYNDROME): ICD-10-CM

## 2018-05-09 DIAGNOSIS — I71.40 AAA (ABDOMINAL AORTIC ANEURYSM) WITHOUT RUPTURE: ICD-10-CM

## 2018-05-09 LAB
ALBUMIN SERPL BCP-MCNC: 3.8 G/DL
ALP SERPL-CCNC: 76 U/L
ALT SERPL W/O P-5'-P-CCNC: 14 U/L
ANION GAP SERPL CALC-SCNC: 9 MMOL/L
AST SERPL-CCNC: 15 U/L
BASOPHILS # BLD AUTO: 0.04 K/UL
BASOPHILS NFR BLD: 0.9 %
BILIRUB SERPL-MCNC: 0.6 MG/DL
BUN SERPL-MCNC: 12 MG/DL
CALCIUM SERPL-MCNC: 9.2 MG/DL
CHLORIDE SERPL-SCNC: 101 MMOL/L
CO2 SERPL-SCNC: 25 MMOL/L
CREAT SERPL-MCNC: 0.7 MG/DL
DIFFERENTIAL METHOD: ABNORMAL
EOSINOPHIL # BLD AUTO: 0.2 K/UL
EOSINOPHIL NFR BLD: 3.6 %
ERYTHROCYTE [DISTWIDTH] IN BLOOD BY AUTOMATED COUNT: 19.1 %
EST. GFR  (AFRICAN AMERICAN): >60 ML/MIN/1.73 M^2
EST. GFR  (NON AFRICAN AMERICAN): >60 ML/MIN/1.73 M^2
GLUCOSE SERPL-MCNC: 92 MG/DL
HCT VFR BLD AUTO: 30 %
HGB BLD-MCNC: 9.7 G/DL
IMM GRANULOCYTES # BLD AUTO: 0.02 K/UL
IMM GRANULOCYTES NFR BLD AUTO: 0.5 %
LDH SERPL L TO P-CCNC: 135 U/L
LYMPHOCYTES # BLD AUTO: 1.7 K/UL
LYMPHOCYTES NFR BLD: 37.7 %
MCH RBC QN AUTO: 30.4 PG
MCHC RBC AUTO-ENTMCNC: 32.3 G/DL
MCV RBC AUTO: 94 FL
MONOCYTES # BLD AUTO: 1.2 K/UL
MONOCYTES NFR BLD: 26.1 %
NEUTROPHILS # BLD AUTO: 1.4 K/UL
NEUTROPHILS NFR BLD: 31.2 %
NRBC BLD-RTO: 0 /100 WBC
PLATELET # BLD AUTO: 60 K/UL
PMV BLD AUTO: ABNORMAL FL
POTASSIUM SERPL-SCNC: 4.7 MMOL/L
PROT SERPL-MCNC: 7.2 G/DL
RBC # BLD AUTO: 3.19 M/UL
SODIUM SERPL-SCNC: 135 MMOL/L
WBC # BLD AUTO: 4.4 K/UL

## 2018-05-09 PROCEDURE — 36415 COLL VENOUS BLD VENIPUNCTURE: CPT

## 2018-05-09 PROCEDURE — 99999 PR PBB SHADOW E&M-EST. PATIENT-LVL III: CPT | Mod: PBBFAC,,, | Performed by: INTERNAL MEDICINE

## 2018-05-09 PROCEDURE — 99214 OFFICE O/P EST MOD 30 MIN: CPT | Mod: S$GLB,,, | Performed by: INTERNAL MEDICINE

## 2018-05-09 PROCEDURE — 3074F SYST BP LT 130 MM HG: CPT | Mod: CPTII,S$GLB,, | Performed by: INTERNAL MEDICINE

## 2018-05-09 PROCEDURE — 85025 COMPLETE CBC W/AUTO DIFF WBC: CPT

## 2018-05-09 PROCEDURE — 80053 COMPREHEN METABOLIC PANEL: CPT

## 2018-05-09 PROCEDURE — 3078F DIAST BP <80 MM HG: CPT | Mod: CPTII,S$GLB,, | Performed by: INTERNAL MEDICINE

## 2018-05-09 PROCEDURE — 99499 UNLISTED E&M SERVICE: CPT | Mod: S$GLB,,, | Performed by: INTERNAL MEDICINE

## 2018-05-09 PROCEDURE — 83615 LACTATE (LD) (LDH) ENZYME: CPT

## 2018-05-09 NOTE — PROGRESS NOTES
CC: Pancytopenia, followup visit   Referred by:      HPI:  is a 68y/o male with AAA, diverticuliltis, hypertension, h/o smoking, and pancytopenia, here for follow up. He has been evaluated by  for pancytopenia, including severe anemia with Hb 6 g/dl . He denied any overt bleeding at the time of his initial evaluation, including melena, hematochezia or upper GI bleeding, epistaxis or hematuria. CBC on 2/7/2018 showed WBC count of  2190/mm3, Hb 8.4g/dL, HCT 24.2%, platelets of 37k. Reticulocytes were 1.4%. Iron studies showed very high serum iron saturation, but was done post transfusion. Serum B12 was normal. Serum folate was low, but RBC folate was normal. He had normal renal and liver functions. Haptoglobin was low, < 10. LDH was normal. Stool OB was negative. Zinc was low. Copper was normal. SPEP did not show any paraprotein. CHAPO was negative. HIV, HBV, HCV serologies were negative. His CBC in October 2016 showed macrocytic anemia but no leukopenia or thrombocytopenia. He had bone marrow biopsy on 3/8/18.  He had CT chest on 2/25/18 which showed a left upper lung lobe nodule.    Interval history: He had left VATS and DEIRDRE wedge resection on 4/17/18. Pathology showed 1.4cm, moderately differentiated invasive adenocarcinoma, VPI present, LVI present, 3mm parenchyml margin, pT2NX      Review of Systems   Constitutional: Positive for malaise/fatigue and weight loss. Negative for fever.   HENT: Negative.    Eyes: Negative.    Respiratory: Negative.    Cardiovascular: Negative.    Gastrointestinal: Negative for abdominal pain, heartburn, nausea and vomiting.   Genitourinary: Negative for dysuria, frequency and urgency.   Musculoskeletal: Negative.  Negative for back pain, myalgias and neck pain.   Skin: Negative.    Neurological: Positive for weakness. Negative for dizziness, sensory change, speech change and focal weakness.   Endo/Heme/Allergies: Negative.    Psychiatric/Behavioral:  Negative.        Current Outpatient Prescriptions   Medication Sig    cyanocobalamin (VITAMIN B-12) 100 MCG tablet Take 100 mcg by mouth once daily.    folic acid (FOLVITE) 800 MCG Tab Take 800 mcg by mouth once daily.    losartan-hydrochlorothiazide 50-12.5 mg (HYZAAR) 50-12.5 mg per tablet Take 1 tablet by mouth every morning.     oxyCODONE-acetaminophen (PERCOCET) 5-325 mg per tablet Take 1 tablet by mouth every 4 (four) hours as needed for Pain (Pain related to surgery and cancer diagnosis).    zinc gluconate 50 mg tablet Take 50 mg by mouth once daily.    acetaminophen (TYLENOL) 500 MG tablet Take 500 mg by mouth every 6 (six) hours as needed for Pain.     No current facility-administered medications for this visit.        Vitals:    05/09/18 1354   BP: (!) 121/59   Pulse: 76       PS: ECOG 1      Physical Exam   Constitutional: He is oriented to person, place, and time. He appears well-developed. No distress.   HENT:   Head: Normocephalic and atraumatic.   Mouth/Throat: No oropharyngeal exudate.   Eyes: Pupils are equal, round, and reactive to light. No scleral icterus.   Neck: Normal range of motion.   Cardiovascular: Normal rate and regular rhythm.    No murmur heard.  Pulmonary/Chest: Effort normal and breath sounds normal. No respiratory distress. He has no wheezes. He has no rales.   Abdominal: Soft. He exhibits no distension. There is no tenderness. There is no rebound.   Musculoskeletal: He exhibits no edema.   Lymphadenopathy:     He has no cervical adenopathy.   Neurological: He is alert and oriented to person, place, and time. No cranial nerve deficit.   Skin: Skin is warm.   Psychiatric: He has a normal mood and affect.      2/7/18 SPEP: No paraprotein bands identified     3/8/18 bone marrow aspiration/biopsy     BONE MARROW ASPIRATE SMEARS, TOUCH IMPRINTS, CORE BIOPSY, LEFT ILIAC CREST, AND CLOT SECTION:     --Hypercellular bone marrow with megakaryocyte atypia and ring sideroblasts, see  "comment.  --Mildly increased reticulin fibrosis (MF-1)  --Mild polytypic plasmacytosis, 5.6%, see comment.     COMMENT: The core biopsy is hypercellular for age (50%) and features panhyperplasia. The megakaryocytes are predominantly small and hypolobated. Blasts are not increased by morphology or in the corresponding flow  cytometric analysis (please see separate report). There is mild erythroid atypia and approximately 7.5% of the erythroid precursors are ring sideroblasts. Taken together, these findings are concerning for a myelodysplastic  syndrome; however, all other non-neoplastic causes for these features must be excluded, and correlation with the corresponding cytogenetics analysis is required. Given the presence of ring sideroblasts, NGS including SF3B1  mutational analysis is being performed at [facility], and these results will be reported in a supplemental report. Additionally, there is a mild polytypic plasmacytosis, which may be seen in association with  bacterial and viral infections, autoimmune conditions, cirrhosis, and in association with neoplastic disorders.       NGS panel    NGS Pathogenic Mutations Detected SEE BELOW    Comment: 1. ASXL1: Chr20(GRCh37):g.[id number];   NM_015338.5(ASXL1):c.2290del; p.Pfi054Qcehe*8 (26%)   2. CBL: Chr11(GRCh37):g.670652154G>A; NM_005188.3(CBL):c.1211G>A; p.Gow600Huk (38%)   3. SETBP1: Chr18(GRCh37):g.04867831U>A; NM_015559.2(SETBP1):c.2606G>A; p.Nde039Jzr (27%) 4. U2AF1: Chr21(GRCh37):g.74437555D>G; NM_001025203.1(U2AF1):c.470A>C; p.Koo959Oxy (38%)   No other pathogenic mutations were detected in the other genes tested on the panel at the reportable limit of assay detection.  See below for Variants of Unknown Significance   and Additional Notes.  Please see the section of "Panel Gene List" below for the complete list of genes tested.              Cytogenetics     The result is abnormal. 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 This translocation has been associated with both de bill and therapy-related MDS and AML   These findings support a neoplastic etiology for the morphologic features seen, and this case is best classified as a myelodysplastic syndrome with single lineage dysplasia pending NGS for SF3B1 mutational analysis.     Flow cytometry        Flow cytometric analysis of  bone marrow shows populations of polyclonal B lymphocytes and T lymphocytes that are immunophenotypically unremarkable.  The plasma cells are polytypic.  The blast gate is not increased. Flow differential:  Lymphocytes 12.5%,    Monocytes 19.0%, Granulocytes  65.0%, Blast  1.3%, Debris/nRBC 2.1%,  Viability 93.3%.       4/12/18 PET CT    FINDINGS:  Quality of the study: Adequate.    Abnormal focus of hypermetabolic activity within the apex at the left lung.  Corresponding CT findings demonstrated spiculated pulmonary nodule measuring approximately 1.2 cm.  Prevascular lymph node measuring approximately 1.0  cm with mildly increased tracer uptake.  Constellation of findings concerning for neoplastic process with indeterminate prevascular node.  Metastasis to the noted is not excluded.    Index lesion:    - Left apex lung mass: SUV max 10.2    - Pre-vascular lymph node: SUV max 2.9    Physiologic uptake of the tracer is present within the brain, salivary glands, myocardium, GI and  tracts.    Incidental CT findings: Paraseptal emphysematous changes of bilateral lungs with an upper lobe zone predominance.  Bibasilar atelectasis.  Extensive coronary artery calcifications.  Abdominal aortic aneurysm status post endovascular therapy.  Stable hepatic cyst which does not demonstrate increased tracer uptake.  Diverticulosis without diverticulitis.  Stable left renal cyst.  Moderate degenerative change involving the osseous structures without acute fracture or destructive osseous  process.   Impression       Spiculated pulmonary nodule within the left upper lobe demonstrating hypermetabolic activity.  Prevascular lymph node with mildly increased tracer uptake.  Constellation of findings concerning for neoplastic process with indeterminate prevascular node.           4/17/18 left upper lobe wedge resection    Specimen Laterality: Left  Tumor Site: Upper lobe  Tumor Size: 1.4 cm in greatest dimension macroscopically  Tumor Focality: Single tumor  Histologic Type: Invasive adenocarcinoma, acinar predominant  Histologic Grade: G2, moderately differentiated  Spread Through Air Spaces (JOSÉ MIGUEL): Not identified  Visceral Pleura Invasion: Present  Lymphovascular Invasion: Indeterminate, CD34 pending, results will be issued in an addendum  Direct Invasion of Adjacent Structures: No adjacent structures present  Margins: All margins are uninvolved by carcinoma  Margins examined: parenchymal  Distance of invasive carcinoma from closest margin: 3 mm from the parenchymal margin  Treatment Effect: No known presurgical therapy  Regional Lymph Nodes: No lymph nodes submitted or found  Pathologic Stage Classification (pTNM, AJCC 8th Edition): p T2 NX    COMMENT: The specimen shows a 1.4 cm mass. The tumor is positive for TTF-1 and CK 7 and negative for p63 and CK 5/6. The tumor is invasive with an acinar predominant pattern. This consistent with an invasive  adenocarcinoma, acinar predominant. There is no significant in situ component identified. There is visceral pleural involvement shown on elastin stain performed on blocks B and C which makes this tumor a T 2. Immunostain for  CD34 to evaluate for lymph-vascular invasion pending, results will be issued in an addendum.    Component      Latest Ref Rng & Units 4/19/2018   WBC      3.90 - 12.70 K/uL 6.20   RBC      4.60 - 6.20 M/uL 2.43 (L)   Hemoglobin      14.0 - 18.0 g/dL 7.8 (L)   Hematocrit      40.0 - 54.0 % 23.4 (L)   MCV      82 - 98 fL 96   MCH      27.0  - 31.0 pg 32.1 (H)   MCHC      32.0 - 36.0 g/dL 33.3   RDW      11.5 - 14.5 % 20.9 (H)   Platelets      150 - 350 K/uL 41 (L)   MPV      9.2 - 12.9 fL SEE COMMENT   Immature Granulocytes      0.0 - 0.5 % 1.5 (H)   Gran # (ANC)      1.8 - 7.7 K/uL 3.2   Immature Grans (Abs)      0.00 - 0.04 K/uL 0.09 (H)   Lymph #      1.0 - 4.8 K/uL 1.0   Mono #      0.3 - 1.0 K/uL 1.9 (H)   Eos #      0.0 - 0.5 K/uL 0.1   Baso #      0.00 - 0.20 K/uL 0.02   nRBC      0 /100 WBC 0   Gran%      38.0 - 73.0 % 51.0   Lymph%      18.0 - 48.0 % 16.6 (L)   Mono%      4.0 - 15.0 % 29.8 (H)   Eosinophil%      0.0 - 8.0 % 0.8   Basophil%      0.0 - 1.9 % 0.3   Platelet Estimate       Decreased (A)   Aniso       Moderate   Poik       Slight   Poly       Occasional   Hypo       Occasional   Ovalocytes       Occasional   Tear Drop Cells       Occasional   Xiomara Cells       Occasional   Fragmented Cells       Occasional   Differential Method       Automated   Sodium      136 - 145 mmol/L 127 (L)   Potassium      3.5 - 5.1 mmol/L 4.3   Chloride      95 - 110 mmol/L 95   CO2      23 - 29 mmol/L 25   Glucose      70 - 110 mg/dL 133 (H)   BUN, Bld      8 - 23 mg/dL 11   Creatinine      0.5 - 1.4 mg/dL 0.6   Calcium      8.7 - 10.5 mg/dL 8.5 (L)   Anion Gap      8 - 16 mmol/L 7 (L)   eGFR if African American      >60 mL/min/1.73 m:2 >60.0   eGFR if non African American      >60 mL/min/1.73 m:2 >60.0     Assessment:     1. MDS: He has dysplastic changes in his marrow, with ringed sideroblasts comprising 7.5% of the RBC precursors. NGS panel pending. 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  Blasts were not increased. He was consuming significant amounts of alcohol daily, and more on weekends, at the time of this marrow biopsy. He was also noted to have low serum zinc. He was started on oral zinc, oral B12. However, even after a month of being on these supplements, his  CBC has not changed significantly. His IPSS-R  Score is 4.5, putting him in the intermediate risk category. Median time to 25% AML evolution is~3.2 years.I discussed treatment of intermediate risk MDS.   I explained that conservative/low intensity treatments with EPO analogues, GCSF, transfusions can be tried to assess response.  Hypomethylating agent is an option as well.  NGS panel showed ASXL1, CBL and SEPBP1 mutations. There are no direct therapeutic implications for these mutations at this time.  There are no clinical trials available here at this time for MDS without excess blasts.  He will follow with .     2. Adenocarcinoma lung: pT2Nx. S/p wedge resection. He could get EBUS. There was an indeterminate prevascular lymph node on PET CT. Surveillance is needed. Repeat PET CT/ CT in 3 months. He will have CT head with contrast ( he cannot have MRI due to aneurysmal clip)

## 2018-05-11 ENCOUNTER — APPOINTMENT (OUTPATIENT)
Dept: RADIOLOGY | Facility: HOSPITAL | Age: 68
End: 2018-05-11
Attending: FAMILY MEDICINE
Payer: MEDICARE

## 2018-05-11 ENCOUNTER — OFFICE VISIT (OUTPATIENT)
Dept: FAMILY MEDICINE | Facility: CLINIC | Age: 68
End: 2018-05-11
Payer: MEDICARE

## 2018-05-11 ENCOUNTER — TELEPHONE (OUTPATIENT)
Dept: HEMATOLOGY/ONCOLOGY | Facility: CLINIC | Age: 68
End: 2018-05-11

## 2018-05-11 VITALS
OXYGEN SATURATION: 97 % | HEIGHT: 69 IN | BODY MASS INDEX: 20.14 KG/M2 | RESPIRATION RATE: 17 BRPM | TEMPERATURE: 98 F | HEART RATE: 75 BPM | SYSTOLIC BLOOD PRESSURE: 120 MMHG | DIASTOLIC BLOOD PRESSURE: 78 MMHG | WEIGHT: 136 LBS

## 2018-05-11 DIAGNOSIS — Z00.00 VISIT FOR WELL MAN HEALTH CHECK: Primary | ICD-10-CM

## 2018-05-11 DIAGNOSIS — Z13.220 ENCOUNTER FOR LIPID SCREENING FOR CARDIOVASCULAR DISEASE: ICD-10-CM

## 2018-05-11 DIAGNOSIS — D52.9 ANEMIA DUE TO FOLIC ACID DEFICIENCY, UNSPECIFIED DEFICIENCY TYPE: ICD-10-CM

## 2018-05-11 DIAGNOSIS — F17.201 TOBACCO DEPENDENCE IN REMISSION: ICD-10-CM

## 2018-05-11 DIAGNOSIS — J43.9 PULMONARY EMPHYSEMA, UNSPECIFIED EMPHYSEMA TYPE: ICD-10-CM

## 2018-05-11 DIAGNOSIS — M25.562 LEFT KNEE PAIN, UNSPECIFIED CHRONICITY: ICD-10-CM

## 2018-05-11 DIAGNOSIS — R63.4 WEIGHT LOSS, NON-INTENTIONAL: ICD-10-CM

## 2018-05-11 DIAGNOSIS — I70.0 AORTIC ATHEROSCLEROSIS: ICD-10-CM

## 2018-05-11 DIAGNOSIS — Z13.6 ENCOUNTER FOR LIPID SCREENING FOR CARDIOVASCULAR DISEASE: ICD-10-CM

## 2018-05-11 DIAGNOSIS — I10 ESSENTIAL HYPERTENSION: ICD-10-CM

## 2018-05-11 DIAGNOSIS — C34.92 ADENOCARCINOMA, LUNG, LEFT: ICD-10-CM

## 2018-05-11 DIAGNOSIS — D46.9 MDS (MYELODYSPLASTIC SYNDROME): ICD-10-CM

## 2018-05-11 DIAGNOSIS — Z23 NEED FOR DIPHTHERIA, TETANUS, PERTUSSIS, AND HIB VACCINATION: ICD-10-CM

## 2018-05-11 DIAGNOSIS — I71.40 AAA (ABDOMINAL AORTIC ANEURYSM) WITHOUT RUPTURE: ICD-10-CM

## 2018-05-11 PROCEDURE — 3078F DIAST BP <80 MM HG: CPT | Mod: CPTII,S$GLB,, | Performed by: FAMILY MEDICINE

## 2018-05-11 PROCEDURE — 73562 X-RAY EXAM OF KNEE 3: CPT | Mod: 26,LT,, | Performed by: RADIOLOGY

## 2018-05-11 PROCEDURE — 3074F SYST BP LT 130 MM HG: CPT | Mod: CPTII,S$GLB,, | Performed by: FAMILY MEDICINE

## 2018-05-11 PROCEDURE — 99214 OFFICE O/P EST MOD 30 MIN: CPT | Mod: S$GLB,,, | Performed by: FAMILY MEDICINE

## 2018-05-11 PROCEDURE — 73562 X-RAY EXAM OF KNEE 3: CPT | Mod: TC,FY,PN,LT

## 2018-05-11 PROCEDURE — 99999 PR PBB SHADOW E&M-EST. PATIENT-LVL III: CPT | Mod: PBBFAC,,, | Performed by: FAMILY MEDICINE

## 2018-05-11 PROCEDURE — 99499 UNLISTED E&M SERVICE: CPT | Mod: S$GLB,,, | Performed by: FAMILY MEDICINE

## 2018-05-11 RX ORDER — OXYCODONE AND ACETAMINOPHEN 5; 325 MG/1; MG/1
1 TABLET ORAL EVERY 4 HOURS PRN
Qty: 60 TABLET | Refills: 0 | Status: SHIPPED | OUTPATIENT
Start: 2018-05-11 | End: 2018-06-11 | Stop reason: SDUPTHER

## 2018-05-11 NOTE — TELEPHONE ENCOUNTER
----- Message from Lazara Truong MD sent at 5/9/2018  2:47 PM CDT -----  Ct head with contrast this week  Labs today  F/u in 2 months

## 2018-05-11 NOTE — PROGRESS NOTES
"Well Man VISIT      CHIEF COMPLAINT  Chief Complaint   Patient presents with    Establish Care    Knee Pain     left       HPI  Glen Arroyo is a 67 y.o. male who presents for physical.     Social Factors  Tobacco use: No  Ready to Quit: quit 5 months ago  Alcohol: No  Intimate partner violence screening  "Do you feel safe in your current relationship?" Yes   "Have you ever been in a relationship in which your partner frightened you or hurt you?" No  Living Will/POA: No  Regular Exercise: No    Depression  Over the past two weeks, have you felt down, depressed, or hopeless? Yes due to recent diagnosis of CA  Over the past two weeks, have you felt little interest or pleasure in doing things? no    Reproductive Health  STD screening in last year: deferred  HIV screening: deferrred    Screen for Chronic Disease  CHD Risk Factors: advanced age (older than 55 for men, 65 for women) and male gender  Estimated body mass index is 20.09 kg/m² as calculated from the following:    Height as of this encounter: 5' 9" (1.753 m).    Weight as of this encounter: 61.7 kg (136 lb 0.4 oz).  Dyslipidemia screening needed: order 5/11/18  T2DM screening needed: order done recently  Colonoscopy needed: UTD  PSA needed: n/a  AAA screening needed:UTD due to hx of AAA  Screen men 35 years and older, and men 20 to 34 years of age who have cardiovascular risk factors for dyslipidemia  Begin screening colonoscopies at 50 years of age in men of average risk, and continue until 75 years of age; offer fecal occult blood testing every year, flexible sigmoidoscopy every five years combined with fecal occult blood testing every three years, or colonoscopy every 10 years   The American Urological Association recommends offering PSA testing and digital rectal examination to well-informed men beginning at 40 years of age and continuing until life expectancy is less than 10 years  Screen once with ultrasonography in men 65 to 75 years of age if " "they have a family history or have smoked at [id number] cigarettes in their lifetime  Screen men with a sustained blood pressure greater than 135/80 mm Hg for T2DM      Immunizations  delayed    ALLERGIES and MEDS were verified.   PMHx, PSHx, FHx, SOCIALHx were updated as pertinent.    REVIEW OF SYSTEMS  Review of Systems   Constitutional: Negative.    HENT: Negative.    Eyes: Negative.    Respiratory: Negative.    Cardiovascular: Negative.    Gastrointestinal: Negative.    Genitourinary: Negative.    Musculoskeletal: Positive for joint pain and myalgias.   Skin: Negative.    Neurological: Negative.          PHYSICAL EXAM  VITAL SIGNS: /78 (BP Location: Right arm, Patient Position: Sitting, BP Method: Medium (Manual))   Pulse 75   Temp 97.7 °F (36.5 °C) (Oral)   Resp 17   Ht 5' 9" (1.753 m)   Wt 61.7 kg (136 lb 0.4 oz)   SpO2 97%   BMI 20.09 kg/m²   GEN: Well developed, Well nourished, No acute distress.  HENT: Normocephalic, Atraumatic, Bilateral external ears normal, Nose normal, Oropharynx moist, No oral exudates.   Eyes: PERRL, EOMI, Conjunctiva normal, No discharge.   Neck: Supple, No tenderness.  Lymphatic: No cervical or supraclavicular lymphadenopathy noted.   Cardiovascular: Normal heart rate, Normal rhythm, No murmurs, No rubs, No gallops.   Thorax & Lungs: Normal breath sounds, No respiratory distress, No wheezing.  Abdomen: Soft, No tenderness, Bowel sounds normal.  Genital: deferred  Skin: Warm, Dry, No erythema, No rash.   Extremities: No edema, No tenderness.       ASSESSMENT/PLAN    Glen was seen today for establish care and knee pain.    Diagnoses and all orders for this visit:    Visit for [employer] [place] health check    Need for diphtheria, tetanus, pertussis, and Hib vaccination    Encounter for lipid screening for cardiovascular disease  -     Lipid panel; Future    AAA (abdominal aortic aneurysm) without rupture    Aortic atherosclerosis    Essential hypertension    Pulmonary emphysema, " unspecified emphysema type    MDS (myelodysplastic syndrome)  -     oxyCODONE-acetaminophen (PERCOCET) 5-325 mg per tablet; Take 1 tablet by mouth every 4 (four) hours as needed for Pain (Pain related to surgery and cancer diagnosis).    Anemia due to folic acid deficiency, unspecified deficiency type    Adenocarcinoma, lung, left  -     oxyCODONE-acetaminophen (PERCOCET) 5-325 mg per tablet; Take 1 tablet by mouth every 4 (four) hours as needed for Pain (Pain related to surgery and cancer diagnosis).    Weight loss, non-intentional    Left knee pain, unspecified chronicity  -     X-Ray Knee 3 View Left; Future  -     oxyCODONE-acetaminophen (PERCOCET) 5-325 mg per tablet; Take 1 tablet by mouth every 4 (four) hours as needed for Pain (Pain related to surgery and cancer diagnosis).    Tobacco dependence in remission    Other orders  -     Cancel: Td Vaccine (Adult)       1.  Follow up with CT surgery as scheduled  2.  Follow up with oncology as scheduled  3.  Take all medications as prescribed  4.  Call with any concerns      Sheldon Bailey MD

## 2018-05-14 ENCOUNTER — TELEPHONE (OUTPATIENT)
Dept: FAMILY MEDICINE | Facility: CLINIC | Age: 68
End: 2018-05-14

## 2018-05-14 RX ORDER — LOSARTAN POTASSIUM AND HYDROCHLOROTHIAZIDE 12.5; 5 MG/1; MG/1
1 TABLET ORAL EVERY MORNING
Qty: 90 TABLET | Refills: 1 | Status: SHIPPED | OUTPATIENT
Start: 2018-05-14 | End: 2018-07-12 | Stop reason: ALTCHOICE

## 2018-05-14 NOTE — TELEPHONE ENCOUNTER
----- Message from Sheldon Bailey MD sent at 5/11/2018 11:59 AM CDT -----  Please let patient know that his xray did show significant arthritis in the left knee.  He should see ortho for their opinion as to what he can do.    Thanks,  Dr. Bailey

## 2018-05-14 NOTE — TELEPHONE ENCOUNTER
----- Message from Yue Larson sent at 5/14/2018  9:43 AM CDT -----  Contact: Self/ [phone]  REFILL: [losartan-hydrochlorothiazide 50-12.5 mg (HYZAAR) 50-12.5 mg per tablet] Thank you.  [facility] [address] - [facility] [address] [address]  Phone: [phone] Fax: [phone]

## 2018-05-16 ENCOUNTER — HOSPITAL ENCOUNTER (OUTPATIENT)
Dept: RADIOLOGY | Facility: HOSPITAL | Age: 68
Discharge: HOME | End: 2018-05-16
Attending: INTERNAL MEDICINE
Payer: MEDICARE

## 2018-05-16 DIAGNOSIS — C34.92 ADENOCARCINOMA, LUNG, LEFT: ICD-10-CM

## 2018-05-16 PROCEDURE — 70470 CT HEAD/BRAIN W/O & W/DYE: CPT | Mod: 26,,, | Performed by: RADIOLOGY

## 2018-05-16 PROCEDURE — 70470 CT HEAD/BRAIN W/O & W/DYE: CPT | Mod: TC

## 2018-05-16 PROCEDURE — 25500020 PHARM REV CODE 255: Performed by: INTERNAL MEDICINE

## 2018-05-16 RX ADMIN — IOHEXOL 80 ML: 350 INJECTION, SOLUTION INTRAVENOUS at 11:05

## 2018-05-18 ENCOUNTER — TELEPHONE (OUTPATIENT)
Dept: FAMILY MEDICINE | Facility: CLINIC | Age: 68
End: 2018-05-18

## 2018-05-18 DIAGNOSIS — Z12.11 COLON CANCER SCREENING: ICD-10-CM

## 2018-05-18 NOTE — TELEPHONE ENCOUNTER
----- Message from Delia Brenner sent at 5/18/2018  1:40 PM CDT -----  Contact: Self   Patient is returning a call. [phone].

## 2018-05-18 NOTE — TELEPHONE ENCOUNTER
----- Message from Delia Brenner sent at 5/18/2018  1:40 PM CDT -----  Contact: Self   Patient is returning a call. [phone].

## 2018-05-18 NOTE — TELEPHONE ENCOUNTER
----- Message from Ally Smith sent at 5/18/2018  2:24 PM CDT -----  Contact: [phone]/PT  Calling TO speak with nurse to get results

## 2018-05-18 NOTE — TELEPHONE ENCOUNTER
----- Message from Del Alejo sent at 5/18/2018 12:46 PM CDT -----  Contact: Self  Pt states he has to see specialist for his bones and wants to make sure his X-rays are in Epic. Pt can be reached @ [phone].

## 2018-05-18 NOTE — TELEPHONE ENCOUNTER
Attempted to call back pt. To let him know that his x-ray results is in the Epic system. No answerer, left message on VM to call the clinic.

## 2018-05-22 ENCOUNTER — LAB VISIT (OUTPATIENT)
Dept: LAB | Facility: HOSPITAL | Age: 68
End: 2018-05-22
Attending: FAMILY MEDICINE
Payer: MEDICARE

## 2018-05-22 DIAGNOSIS — Z13.220 ENCOUNTER FOR LIPID SCREENING FOR CARDIOVASCULAR DISEASE: ICD-10-CM

## 2018-05-22 DIAGNOSIS — Z13.6 ENCOUNTER FOR LIPID SCREENING FOR CARDIOVASCULAR DISEASE: ICD-10-CM

## 2018-05-22 LAB
CHOLEST SERPL-MCNC: 106 MG/DL
CHOLEST/HDLC SERPL: 3.7 {RATIO}
HDLC SERPL-MCNC: 29 MG/DL
HDLC SERPL: 27.4 %
LDLC SERPL CALC-MCNC: 62.6 MG/DL
NONHDLC SERPL-MCNC: 77 MG/DL
TRIGL SERPL-MCNC: 72 MG/DL

## 2018-05-22 PROCEDURE — 80061 LIPID PANEL: CPT

## 2018-05-22 PROCEDURE — 36415 COLL VENOUS BLD VENIPUNCTURE: CPT | Mod: PN

## 2018-05-23 ENCOUNTER — TELEPHONE (OUTPATIENT)
Dept: CARDIOTHORACIC SURGERY | Facility: CLINIC | Age: 68
End: 2018-05-23

## 2018-05-23 NOTE — TELEPHONE ENCOUNTER
----- Message from Kristal Victor sent at 5/23/2018  9:58 AM CDT -----  Contact: heather[employer]  [phone] ref num [id number]//caller states that she needs to speak with nurse in ref to getting a copy of pts original path report and the cpt code for the surgery/please call/thank you [phone] is the number where the info can be faxed

## 2018-05-24 ENCOUNTER — TELEPHONE (OUTPATIENT)
Dept: FAMILY MEDICINE | Facility: CLINIC | Age: 68
End: 2018-05-24

## 2018-05-24 NOTE — TELEPHONE ENCOUNTER
No answer, LVM instructing patient to call the clinic at his earliest convenience regarding his lab results.    *Patient's cholesterol doing well per Dr. Bailey.

## 2018-05-24 NOTE — TELEPHONE ENCOUNTER
----- Message from Sheldon Bailey MD sent at 5/24/2018 11:07 AM CDT -----  Please let patient know that his cholesterol is doing well.    Thanks,  Dr. Bailey

## 2018-05-24 NOTE — TELEPHONE ENCOUNTER
Notified pt. Of lab results. Pt. States he saw ortho and received a cortisone injection in that knee. He states he is feeling better.

## 2018-05-28 ENCOUNTER — TELEPHONE (OUTPATIENT)
Dept: FAMILY MEDICINE | Facility: CLINIC | Age: 68
End: 2018-05-28

## 2018-05-28 DIAGNOSIS — F51.01 PRIMARY INSOMNIA: Primary | ICD-10-CM

## 2018-05-28 NOTE — TELEPHONE ENCOUNTER
----- Message from Sheldon Bailey MD sent at 5/28/2018  1:24 PM CDT -----  Contact: self  Please check to see if he has tried any OTC medications.    Thanks,  Dr. Bailey     ----- Message -----  From: Keyona Nails MA  Sent: 5/28/2018   1:09 PM  To: Sheldon Bailey MD        ----- Message -----  From: Tod York  Sent: 5/28/2018  11:34 AM  To: Lynn Chung Staff    Pt called requesting sleep medication. States he can not sleep. Contact pt at [phone].    Thanks-

## 2018-05-29 ENCOUNTER — TELEPHONE (OUTPATIENT)
Dept: HEMATOLOGY/ONCOLOGY | Facility: CLINIC | Age: 68
End: 2018-05-29

## 2018-05-29 NOTE — TELEPHONE ENCOUNTER
Spoke with pt. Explained that Dr. Truong stated he should call Dr. Westbrook's office to schedule treatments at [facility]. Pt verbalized understanding.  Gaby

## 2018-05-29 NOTE — TELEPHONE ENCOUNTER
----- Message from Cesar Zheng sent at 5/29/2018  9:16 AM CDT -----  Good Morning , I called Mr. Arroyo to reschedule his appt. Because Dr. Flores will be on Vac. July 11th. He has questions as to when he will be setup for treatment or what is the next step. Please call back.

## 2018-05-29 NOTE — TELEPHONE ENCOUNTER
----- Message from Marta Shin sent at 5/28/2018  4:31 PM CDT -----  Contact: self  Pt calling to check on the status of medication request. [phone].

## 2018-05-29 NOTE — TELEPHONE ENCOUNTER
Notified pt. Request for sleep medication is still pending. Dr. Bailey will be in this afternoon.

## 2018-05-30 RX ORDER — HYDROXYZINE PAMOATE 50 MG/1
50 CAPSULE ORAL NIGHTLY PRN
Qty: 30 CAPSULE | Refills: 0 | Status: SHIPPED | OUTPATIENT
Start: 2018-05-30 | End: 2018-06-12

## 2018-05-30 NOTE — TELEPHONE ENCOUNTER
Prescription was sent to his pharmacy for Visaril.  He can take this 30 minutes prior to going to bed.    Thanks,  Dr. Bailey

## 2018-05-30 NOTE — TELEPHONE ENCOUNTER
----- Message from Del Alejo sent at 5/30/2018  8:43 AM CDT -----  Contact: Self  Pt called to f/u on previous mesg. Pt can be reached @ [phone]

## 2018-06-01 ENCOUNTER — TELEPHONE (OUTPATIENT)
Dept: HEMATOLOGY/ONCOLOGY | Facility: CLINIC | Age: 68
End: 2018-06-01

## 2018-06-01 DIAGNOSIS — D46.9 MYELODYSPLASTIC SYNDROME: Primary | ICD-10-CM

## 2018-06-01 NOTE — TELEPHONE ENCOUNTER
----- Message from Lazara Truong MD sent at 5/29/2018 10:44 AM CDT -----  He should be seen when I come back.He follows with oncology at [facility]  ----- Message -----  From: Cesar Zheng  Sent: 5/29/2018   9:16 AM  To: Lazara Truong MD, Dionne Haile Staff    Good Morning , I called Mr. Arroyo to reschedule his appt. Because Dr. Flores will be on Vac. July 11th. He has questions as to when he will be setup for treatment or what is the next step. Please call back.

## 2018-06-04 ENCOUNTER — OFFICE VISIT (OUTPATIENT)
Dept: FAMILY MEDICINE | Facility: CLINIC | Age: 68
End: 2018-06-04
Payer: MEDICARE

## 2018-06-04 ENCOUNTER — LAB VISIT (OUTPATIENT)
Dept: LAB | Facility: HOSPITAL | Age: 68
End: 2018-06-04
Attending: INTERNAL MEDICINE
Payer: MEDICARE

## 2018-06-04 VITALS
BODY MASS INDEX: 19.52 KG/M2 | RESPIRATION RATE: 16 BRPM | OXYGEN SATURATION: 97 % | SYSTOLIC BLOOD PRESSURE: 94 MMHG | TEMPERATURE: 98 F | HEIGHT: 69 IN | HEART RATE: 101 BPM | WEIGHT: 131.81 LBS | DIASTOLIC BLOOD PRESSURE: 60 MMHG

## 2018-06-04 DIAGNOSIS — D46.9 MYELODYSPLASTIC SYNDROME: ICD-10-CM

## 2018-06-04 DIAGNOSIS — D46.9 MDS (MYELODYSPLASTIC SYNDROME): ICD-10-CM

## 2018-06-04 DIAGNOSIS — F51.01 PRIMARY INSOMNIA: Primary | ICD-10-CM

## 2018-06-04 LAB
BASOPHILS # BLD AUTO: 0.03 K/UL
BASOPHILS NFR BLD: 0.4 %
DIFFERENTIAL METHOD: ABNORMAL
EOSINOPHIL # BLD AUTO: 0 K/UL
EOSINOPHIL NFR BLD: 0.4 %
ERYTHROCYTE [DISTWIDTH] IN BLOOD BY AUTOMATED COUNT: 19.1 %
FERRITIN SERPL-MCNC: 1971 NG/ML
HCT VFR BLD AUTO: 30.3 %
HGB BLD-MCNC: 10.2 G/DL
IRON SERPL-MCNC: 41 UG/DL
LYMPHOCYTES # BLD AUTO: 1.2 K/UL
LYMPHOCYTES NFR BLD: 17.3 %
MCH RBC QN AUTO: 28 PG
MCHC RBC AUTO-ENTMCNC: 33.7 G/DL
MCV RBC AUTO: 83 FL
MONOCYTES # BLD AUTO: 3.7 K/UL
MONOCYTES NFR BLD: 51.7 %
NEUTROPHILS # BLD AUTO: 2.2 K/UL
NEUTROPHILS NFR BLD: 30.9 %
PLATELET # BLD AUTO: 89 K/UL
PLATELET BLD QL SMEAR: ABNORMAL
PMV BLD AUTO: ABNORMAL FL
RBC # BLD AUTO: 3.64 M/UL
SATURATED IRON: 26 %
TOTAL IRON BINDING CAPACITY: 160 UG/DL
TRANSFERRIN SERPL-MCNC: 108 MG/DL
WBC # BLD AUTO: 7.18 K/UL

## 2018-06-04 PROCEDURE — 99999 PR PBB SHADOW E&M-EST. PATIENT-LVL III: CPT | Mod: PBBFAC,,, | Performed by: FAMILY MEDICINE

## 2018-06-04 PROCEDURE — 85025 COMPLETE CBC W/AUTO DIFF WBC: CPT

## 2018-06-04 PROCEDURE — 99214 OFFICE O/P EST MOD 30 MIN: CPT | Mod: S$GLB,,, | Performed by: FAMILY MEDICINE

## 2018-06-04 PROCEDURE — 83540 ASSAY OF IRON: CPT

## 2018-06-04 PROCEDURE — 82728 ASSAY OF FERRITIN: CPT

## 2018-06-04 PROCEDURE — 3078F DIAST BP <80 MM HG: CPT | Mod: CPTII,S$GLB,, | Performed by: FAMILY MEDICINE

## 2018-06-04 PROCEDURE — 36415 COLL VENOUS BLD VENIPUNCTURE: CPT | Mod: PN

## 2018-06-04 PROCEDURE — 82668 ASSAY OF ERYTHROPOIETIN: CPT

## 2018-06-04 PROCEDURE — 3074F SYST BP LT 130 MM HG: CPT | Mod: CPTII,S$GLB,, | Performed by: FAMILY MEDICINE

## 2018-06-04 RX ORDER — MIRTAZAPINE 15 MG/1
15 TABLET, ORALLY DISINTEGRATING ORAL NIGHTLY
Qty: 30 TABLET | Refills: 0 | Status: SHIPPED | OUTPATIENT
Start: 2018-06-04 | End: 2018-07-02 | Stop reason: SDUPTHER

## 2018-06-05 ENCOUNTER — TELEPHONE (OUTPATIENT)
Dept: HEMATOLOGY/ONCOLOGY | Facility: CLINIC | Age: 68
End: 2018-06-05

## 2018-06-05 DIAGNOSIS — D64.9 ANEMIA: Primary | ICD-10-CM

## 2018-06-05 LAB — ERYTHROPOIETIN: 90.1 MIU/ML

## 2018-06-05 NOTE — TELEPHONE ENCOUNTER
----- Message from Paula Westbrook MD sent at 6/1/2018  4:13 PM CDT -----  EPO orders completed    Pt needs EPO level, cbc, and FE studies drawn MON    IF EPO level < 500 , schedule procrit    IF EPO level >500 I will need to discuss Vidaza w/pt at f/u visit.

## 2018-06-05 NOTE — TELEPHONE ENCOUNTER
Called pt & informed him that we are going to set him up for Procrit next week after MD visit, he voiced understanding.

## 2018-06-11 ENCOUNTER — TELEPHONE (OUTPATIENT)
Dept: FAMILY MEDICINE | Facility: CLINIC | Age: 68
End: 2018-06-11

## 2018-06-11 DIAGNOSIS — C34.92 ADENOCARCINOMA, LUNG, LEFT: ICD-10-CM

## 2018-06-11 DIAGNOSIS — M25.562 LEFT KNEE PAIN, UNSPECIFIED CHRONICITY: ICD-10-CM

## 2018-06-11 DIAGNOSIS — D46.9 MDS (MYELODYSPLASTIC SYNDROME): ICD-10-CM

## 2018-06-11 RX ORDER — OXYCODONE AND ACETAMINOPHEN 5; 325 MG/1; MG/1
1 TABLET ORAL EVERY 4 HOURS PRN
Qty: 60 TABLET | Refills: 0 | Status: SHIPPED | OUTPATIENT
Start: 2018-06-11 | End: 2018-07-02 | Stop reason: SDUPTHER

## 2018-06-11 NOTE — TELEPHONE ENCOUNTER
Pt. Wanted to let Dr. Bailey know he has an oncology appt tomorrow and injection of procrit scheduled.

## 2018-06-11 NOTE — TELEPHONE ENCOUNTER
Percocet has been refilled.  He is to increase Remeron to 2 pills at bedtime.    Thanks,  Dr. Bailey

## 2018-06-11 NOTE — TELEPHONE ENCOUNTER
----- Message from Tod York sent at 6/11/2018  8:16 AM CDT -----  Contact: self  Pt called to give update regarding mirtazapine (REMERON SOL-TAB) 15 MG disintegrating tablet. Contact pt at [phone].

## 2018-06-11 NOTE — TELEPHONE ENCOUNTER
Contacted patient, patient states the Remeron is not working. Pt. States on 6/6 he took the Percocet and Remeron togather and slept well. Would you like for him to take 2 Remeron's at bedtime?    Please refill Percocet.

## 2018-06-12 ENCOUNTER — OFFICE VISIT (OUTPATIENT)
Dept: HEMATOLOGY/ONCOLOGY | Facility: CLINIC | Age: 68
End: 2018-06-12
Payer: MEDICARE

## 2018-06-12 ENCOUNTER — LAB VISIT (OUTPATIENT)
Dept: LAB | Facility: HOSPITAL | Age: 68
End: 2018-06-12
Attending: INTERNAL MEDICINE
Payer: MEDICARE

## 2018-06-12 VITALS
TEMPERATURE: 98 F | DIASTOLIC BLOOD PRESSURE: 64 MMHG | WEIGHT: 129.31 LBS | SYSTOLIC BLOOD PRESSURE: 107 MMHG | HEIGHT: 69 IN | BODY MASS INDEX: 19.15 KG/M2 | OXYGEN SATURATION: 98 % | HEART RATE: 118 BPM

## 2018-06-12 DIAGNOSIS — D46.9 MYELODYSPLASTIC SYNDROME: ICD-10-CM

## 2018-06-12 DIAGNOSIS — F10.11 HISTORY OF ETOH ABUSE: ICD-10-CM

## 2018-06-12 DIAGNOSIS — D46.9 MDS (MYELODYSPLASTIC SYNDROME): Primary | ICD-10-CM

## 2018-06-12 DIAGNOSIS — D69.6 THROMBOCYTOPENIA: ICD-10-CM

## 2018-06-12 DIAGNOSIS — C34.92 ADENOCARCINOMA, LUNG, LEFT: ICD-10-CM

## 2018-06-12 LAB
BASOPHILS # BLD AUTO: 0.01 K/UL
BASOPHILS NFR BLD: 0.1 %
DIFFERENTIAL METHOD: ABNORMAL
EOSINOPHIL # BLD AUTO: 0.1 K/UL
EOSINOPHIL NFR BLD: 0.7 %
ERYTHROCYTE [DISTWIDTH] IN BLOOD BY AUTOMATED COUNT: 18.8 %
HCT VFR BLD AUTO: 26.6 %
HGB BLD-MCNC: 9 G/DL
LYMPHOCYTES # BLD AUTO: 1.6 K/UL
LYMPHOCYTES NFR BLD: 22.1 %
MCH RBC QN AUTO: 27.8 PG
MCHC RBC AUTO-ENTMCNC: 33.8 G/DL
MCV RBC AUTO: 82 FL
MONOCYTES # BLD AUTO: 3.7 K/UL
MONOCYTES NFR BLD: 53 %
NEUTROPHILS # BLD AUTO: 1.7 K/UL
NEUTROPHILS NFR BLD: 24.5 %
PLATELET # BLD AUTO: 72 K/UL
PLATELET BLD QL SMEAR: ABNORMAL
PMV BLD AUTO: ABNORMAL FL
RBC # BLD AUTO: 3.24 M/UL
WBC # BLD AUTO: 7 K/UL

## 2018-06-12 PROCEDURE — 85025 COMPLETE CBC W/AUTO DIFF WBC: CPT

## 2018-06-12 PROCEDURE — 99215 OFFICE O/P EST HI 40 MIN: CPT | Mod: S$GLB,,, | Performed by: INTERNAL MEDICINE

## 2018-06-12 PROCEDURE — 99999 PR PBB SHADOW E&M-EST. PATIENT-LVL IV: CPT | Mod: PBBFAC,,, | Performed by: INTERNAL MEDICINE

## 2018-06-12 PROCEDURE — 36415 COLL VENOUS BLD VENIPUNCTURE: CPT

## 2018-06-12 PROCEDURE — 3074F SYST BP LT 130 MM HG: CPT | Mod: CPTII,S$GLB,, | Performed by: INTERNAL MEDICINE

## 2018-06-12 PROCEDURE — 3078F DIAST BP <80 MM HG: CPT | Mod: CPTII,S$GLB,, | Performed by: INTERNAL MEDICINE

## 2018-06-12 PROCEDURE — 99499 UNLISTED E&M SERVICE: CPT | Mod: S$GLB,,, | Performed by: INTERNAL MEDICINE

## 2018-06-12 NOTE — PROGRESS NOTES
"Subjective:       Patient ID: Glen Arroyo is a 67 y.o. male.    Chief Complaint: Follow-up    HPI   Diagnosis: 1. MDS                     2. NSCLC    HPI: Pt is 68y/o male with pmhx of AAA, diverticuliltis, HTN , tobacco abuse and ETOH abuse hospitalized Feb 2018.. He was  referred to ED from his PCP for abnormal labs revealing severe anemia with Hb 6g/dl . Pt reports fatigue and mild generalized weakness past month. No SOB or CP. No N/V. No rashes. No HA/vision changes. No melena, hematochezia or change in bowel habits.No bleeding-rectal/nasal/urinary.  No known prior hx of abnormal blood counts.Pt s/p 2uprbc transfusion. CBC 2/7/2018 reveals wbc 2190/mm3 Hb 8.4g/dL Hct 24.2% Plt 37k.  Reticulocytes were 1.4%. Iron studies showed very high serum iron saturation, but was done post transfusion. Serum B12 was normal. Serum folate was low, but RBC folate was normal. He had normal renal and liver functions. Haptoglobin was low, < 10. LDH was normal. Stool OB was negative. Zinc was low. Copper was normal. SPEP did not show any paraprotein. CHAPO was negative. HIV, HBV, HCV serologies were negative. He underwent bmbx 3/2018 and diagnosed with MDS         He  visited ED with neck pain and HA   Workup included CT chest 2/25/2018 reveals spiculated 2.0 x 0.8 cm nodule in the left lung apex    He is followed by CTS at [facility]  He had left VATS and DEIRDRE wedge resection on 4/17/18. Pathology showed 1.4cm, moderately differentiated invasive adenocarcinoma, VPI present, LVI present, 3mm parenchyml margin, pT2NX    He reports arthralgias in knees  He is followed by Ortho   He reports he received steroid injection 3 wks ago   He ambulates with assitance of cane secondary to pain   No cough/CP  Mild fatigue  No bleeding-nasal/rectal/urinary     He has hx of ETOH abuse  He quit smoking " cold turkey" >6 mos ago            FINAL PATHOLOGIC DIAGNOSIS  BONE MARROW ASPIRATE SMEARS, TOUCH IMPRINTS, CORE BIOPSY, LEFT ILIAC CREST, AND " "CLOT  SECTION:  --Hypercellular bone marrow with megakaryocyte atypia and ring sideroblasts, see comment.  --Mildly increased reticulin fibrosis (MF-1).  --Mild polytypic plasmacytosis, 5.6%, see comment.    Cytogenetics abnormal Of 20 metaphases, 13 metaphases were normal and 7 metaphases had an unbalanced 1;7  translocation resulting in a 1q duplication and a 7q deletion.    Findings show myelodysplastic syndrome with single lineage dysplasia     Past Medical History:   Diagnosis Date    AAA (abdominal aortic aneurysm) 11/20/2016    Diverticulitis     HTN (hypertension)        Past Surgical History:   Procedure Laterality Date    ABDOMINAL AORTIC ANEURYSM REPAIR      APPENDECTOMY      COLOSTOMY      PLACEMENT AND REMOVAL- Diveticulitis related     COLOSTOMY      diverticulitis.  removed 2 foot of colon.        NKDA      Current MEDS: Reviewed and as per MEDCHART      Review of Systems   Constitutional: Positive for fatigue. Negative for appetite change, fever and unexpected weight change.   HENT: Negative for mouth sores.    Eyes: Negative for visual disturbance.   Respiratory: Negative for cough and shortness of breath.    Cardiovascular: Negative for chest pain.   Gastrointestinal: Negative for abdominal pain and diarrhea.   Genitourinary: Negative for frequency.   Musculoskeletal: Positive for arthralgias. Negative for back pain.   Skin: Negative for rash.   Neurological: Negative for headaches.   Hematological: Negative for adenopathy.   Psychiatric/Behavioral: The patient is not nervous/anxious.        Objective:       Vitals:    06/12/18 1031 06/12/18 1033 06/12/18 1034 06/12/18 1035   BP: 107/64      BP Location: Right arm      Patient Position: Sitting      BP Method: Small (Automatic)      Pulse: (!) 121 (!) 120 (!) 119 (!) 118   Temp: 98.1 °F (36.7 °C)      TempSrc: Oral      SpO2: 98%      Weight: 58.7 kg (129 lb 4.8 oz)      Height: 5' 9" (1.753 m)          Physical Exam   Constitutional: He is " oriented to person, place, and time. He appears well-developed and well-nourished.   HENT:   Head: Normocephalic.   Mouth/Throat: Oropharynx is clear and moist. No oropharyngeal exudate.   Eyes: Conjunctivae are normal. Pupils are equal, round, and reactive to light. No scleral icterus.   Neck: Normal range of motion. Neck supple. No thyromegaly present.   Cardiovascular: Normal rate, regular rhythm and normal heart sounds.    No murmur heard.  Pulmonary/Chest: Effort normal and breath sounds normal. He has no wheezes. He has no rales.   Abdominal: Soft. Bowel sounds are normal. He exhibits no distension and no mass. There is no hepatosplenomegaly. There is no tenderness. There is no rebound and no guarding.   Musculoskeletal: Normal range of motion. He exhibits no edema.   Lymphadenopathy:     He has no cervical adenopathy.     He has no axillary adenopathy.        Right: No supraclavicular adenopathy present.        Left: No supraclavicular adenopathy present.   Neurological: He is alert and oriented to person, place, and time. No cranial nerve deficit.   Skin: No rash noted. No erythema.   Psychiatric: He has a normal mood and affect.       Results for FUNMI PALACIOS (MRN [id number]) as of 6/27/2018 11:16   Ref. Range 6/12/2018 11:31   WBC Latest Ref Range: 3.90 - 12.70 K/uL 7.00   RBC Latest Ref Range: 4.60 - 6.20 M/uL 3.24 (L)   Hemoglobin Latest Ref Range: 14.0 - 18.0 g/dL 9.0 (L)   Hematocrit Latest Ref Range: 40.0 - 54.0 % 26.6 (L)   MCV Latest Ref Range: 82 - 98 fL 82   MCH Latest Ref Range: 27.0 - 31.0 pg 27.8   MCHC Latest Ref Range: 32.0 - 36.0 g/dL 33.8   RDW Latest Ref Range: 11.5 - 14.5 % 18.8 (H)   Platelets Latest Ref Range: 150 - 350 K/uL 72 (L)       Results for FUNMI PALACIOS (MRN [id number]) as of 3/20/2018 14:26   Ref. Range 2/6/2018 15:07 2/7/2018 05:23   Thiamine Latest Ref Range: 38 - 122 ug/L  35 (L)   Vitamin B-12 Latest Ref Range: 210 - 950 pg/mL 280    Results for FUNMI PALACIOS (MRN  "[id number]) as of 3/20/2018 14:26   Ref. Range 2/7/2018 13:29   Zinc, Serum-ALT Latest Ref Range: 60 - 130 ug/dL 38 (L)   Results for FUNMI PALACIOS (MRN [id number]) as of 3/20/2018 14:26   Ref. Range 2/7/2018 13:29   Copper Latest Ref Range: 665 - 1480 ug/L 1178       Pathology 3/8/2018   FINAL PATHOLOGIC DIAGNOSIS  BONE MARROW ASPIRATE SMEARS, TOUCH IMPRINTS, CORE BIOPSY, LEFT ILIAC CREST, AND CLOT  SECTION:  --Hypercellular bone marrow with megakaryocyte atypia and ring sideroblasts, see comment.  --Mildly increased reticulin fibrosis (MF-1).  --Mild polytypic plasmacytosis, 5.6%, see comment.  COMMENT: The core biopsy is hypercellular for age (50%) and features panhyperplasia. The megakaryocytes are  predominantly small and hypolobated. Blasts are not increased by morphology or in the corresponding flow  cytometric analysis (please see separate report). There is mild erythroid atypia and approximately 7.5% of the  erythroid precursors are ring sideroblasts. Taken together, these findings are concerning for a myelodysplastic  syndrome; however, all other non-neoplastic causes for these features must be excluded, and correlation with the  corresponding cytogenetics analysis is required. Given the presence of ring sideroblasts, NGS including SF3B1  mutational analysis is being performed at [facility], and these results will be reported in a  supplemental report. Additionally, there is a mild polytypic plasmacytosis, which may be seen in association with  bacterial and viral infections, autoimmune conditions, cirrhosis, and in association with neoplastic disorders.    Supplemental Diagnosis  Please also see cytogenetic karyotype results reported in Epic from [facility], [address] [place], [place] [id number], which give, in part, the following results:  "The result is abnormal. Of 20 metaphases, 13 metaphases were normal and 7 metaphases had an unbalanced " "1;7  translocation resulting in a 1q duplication and a 7q deletion. This translocation has been associated with both de  bill and therapy-related MDS and AML (Sanada M et al., Leukemia 21(5);992-7, 2007)."  These findings support a neoplastic etiology for the morphologic features seen, and this case is best classified as a  myelodysplastic syndrome with single lineage dysplasia pending NGS for SF3B1 mutational analysis.      CT chest w/out contrast 2/25/2018   1.  Spiculated 2.0 x 0.8 cm nodule in the left lung apex on a background of diffuse paraseptal emphysema.  The findings are concerning for primary pulmonary malignancy.  PET/CT scan or tissue sampling is suggested for further evaluation.    2.  Airspace opacities in the bilateral lower lobes.    3. Extensive coronary artery calcifications.  This may be a marker of coronary artery disease    Results for FUNMI PALACIOS (MRN [id number]) as of 6/12/2018 10:43   Ref. Range 6/4/2018 09:31   Iron Latest Ref Range: 45 - 160 ug/dL 41 (L)   TIBC Latest Ref Range: 250 - 450 ug/dL 160 (L)   Saturated Iron Latest Ref Range: 20 - 50 % 26   Transferrin Latest Ref Range: 200 - 375 mg/dL 108 (L)   Ferritin Latest Ref Range: 20.0 - 300.0 ng/mL 1,971 (H)   Erythropoietin Latest Ref Range: 2.6 - 18.5 mIU/mL 90.1 (H)   Results for FUNMI PALACIOS (MRN [id number]) as of 6/27/2018 08:30   Ref. Range 6/4/2018 09:31   Iron Latest Ref Range: 45 - 160 ug/dL 41 (L)   TIBC Latest Ref Range: 250 - 450 ug/dL 160 (L)   Saturated Iron Latest Ref Range: 20 - 50 % 26   Transferrin Latest Ref Range: 200 - 375 mg/dL 108 (L)   Ferritin Latest Ref Range: 20.0 - 300.0 ng/mL 1,971 (H)   Erythropoietin Latest Ref Range: 2.6 - 18.5 mIU/mL 90.1 (H)     CT head 5/16/2018 -No acute intracranial abnormality.  Assessment:       1. MDS (myelodysplastic syndrome)    2. Thrombocytopenia    3. Adenocarcinoma, lung, left    4. History of ETOH abuse        Plan:   [id number] male with significant comorbidities " with  diagnosed MDS 3/18/2018  He has dysplastic changes in his marrow, with ringed sideroblasts comprising 7.5% of the RBC precursors. NGS panel pending. Cytogenetics revealed unbalanced 1;7 translocation resulting in a 1q duplication and a 7q deletion in 7 of the 20 metaphases.Blasts were not increased.  IPSS-R INT  Score is 4.5, putting him in the intermediate risk category. Median time to 25% AML evolution is~3.2 years.I discussed treatment of intermediate risk MDS.   Pt followed by Dr. Orellana  It has been determined that conservative/low intensity treatments with EPO analogues, GCSF, transfusions can be tried to assess response.  Hypomethylating agent is an option as well.  NGS panel showed ASXL1, CBL and SEPBP1 mutations. There are no direct therapeutic implications for these mutations at this time.  There are no clinical trials available here at this time for MDS without excess blasts.      Hb 9.0g/dl  ANC 1700  plt ct 72k  HIV NEG  Hep panel NEG    Plan to begin weekly  EPO inj ( pending lab parameters)   EPO level < 500      3. Adenocarcinoma lung: pT2Nx. S/p wedge resection. He could get EBUS. There was an indeterminate prevascular lymph node on PET CT. Surveillance is needed. Plan Repeat PET CT/ CT in 3 months.Repeat Chest CT in 4 months for surveillance of NSCLC.   CT head 5/16/2018 -No acute intracranial abnormality.    Greater than 45 minutes spent during this visit of which greater than 50% devoted to counseling and coordination of care regarding diagnosis and management plan.

## 2018-06-20 ENCOUNTER — INFUSION (OUTPATIENT)
Dept: INFUSION THERAPY | Facility: HOSPITAL | Age: 68
End: 2018-06-20
Attending: INTERNAL MEDICINE
Payer: MEDICARE

## 2018-06-20 VITALS — DIASTOLIC BLOOD PRESSURE: 53 MMHG | SYSTOLIC BLOOD PRESSURE: 101 MMHG | HEART RATE: 92 BPM | RESPIRATION RATE: 16 BRPM

## 2018-06-20 DIAGNOSIS — D46.9 MDS (MYELODYSPLASTIC SYNDROME): Primary | ICD-10-CM

## 2018-06-20 PROCEDURE — 63600175 PHARM REV CODE 636 W HCPCS: Mod: JG,EA | Performed by: INTERNAL MEDICINE

## 2018-06-20 PROCEDURE — 96372 THER/PROPH/DIAG INJ SC/IM: CPT

## 2018-06-20 RX ADMIN — ERYTHROPOIETIN 40000 UNITS: 40000 INJECTION, SOLUTION INTRAVENOUS; SUBCUTANEOUS at 08:06

## 2018-06-27 ENCOUNTER — INFUSION (OUTPATIENT)
Dept: INFUSION THERAPY | Facility: HOSPITAL | Age: 68
End: 2018-06-27
Attending: INTERNAL MEDICINE
Payer: MEDICARE

## 2018-06-27 VITALS
TEMPERATURE: 98 F | SYSTOLIC BLOOD PRESSURE: 114 MMHG | DIASTOLIC BLOOD PRESSURE: 59 MMHG | OXYGEN SATURATION: 98 % | HEART RATE: 82 BPM | RESPIRATION RATE: 16 BRPM

## 2018-06-27 DIAGNOSIS — D46.9 MDS (MYELODYSPLASTIC SYNDROME): Primary | ICD-10-CM

## 2018-06-27 PROCEDURE — 96372 THER/PROPH/DIAG INJ SC/IM: CPT

## 2018-06-27 PROCEDURE — 63600175 PHARM REV CODE 636 W HCPCS: Mod: JG,EA | Performed by: INTERNAL MEDICINE

## 2018-06-27 RX ADMIN — ERYTHROPOIETIN 40000 UNITS: 40000 INJECTION, SOLUTION INTRAVENOUS; SUBCUTANEOUS at 08:06

## 2018-06-27 NOTE — PLAN OF CARE
Problem: Patient Care Overview  Goal: Plan of Care Review  Outcome: Ongoing (interventions implemented as appropriate)  Hb 9.7. Tolerated Procrit. Next appts given.

## 2018-07-02 DIAGNOSIS — C34.92 ADENOCARCINOMA, LUNG, LEFT: ICD-10-CM

## 2018-07-02 DIAGNOSIS — D46.9 MDS (MYELODYSPLASTIC SYNDROME): ICD-10-CM

## 2018-07-02 DIAGNOSIS — M25.562 LEFT KNEE PAIN, UNSPECIFIED CHRONICITY: ICD-10-CM

## 2018-07-02 DIAGNOSIS — F51.01 PRIMARY INSOMNIA: ICD-10-CM

## 2018-07-02 RX ORDER — OXYCODONE AND ACETAMINOPHEN 5; 325 MG/1; MG/1
1 TABLET ORAL EVERY 4 HOURS PRN
Qty: 60 TABLET | Refills: 0 | Status: SHIPPED | OUTPATIENT
Start: 2018-07-02 | End: 2018-07-20 | Stop reason: SDUPTHER

## 2018-07-02 RX ORDER — MIRTAZAPINE 15 MG/1
TABLET, ORALLY DISINTEGRATING ORAL
Qty: 30 TABLET | Refills: 0 | Status: SHIPPED | OUTPATIENT
Start: 2018-07-02 | End: 2018-08-29

## 2018-07-05 ENCOUNTER — INFUSION (OUTPATIENT)
Dept: INFUSION THERAPY | Facility: HOSPITAL | Age: 68
End: 2018-07-05
Attending: INTERNAL MEDICINE
Payer: MEDICARE

## 2018-07-05 VITALS
OXYGEN SATURATION: 99 % | TEMPERATURE: 98 F | RESPIRATION RATE: 17 BRPM | HEART RATE: 82 BPM | SYSTOLIC BLOOD PRESSURE: 117 MMHG | DIASTOLIC BLOOD PRESSURE: 58 MMHG

## 2018-07-05 DIAGNOSIS — D46.9 MDS (MYELODYSPLASTIC SYNDROME): Primary | ICD-10-CM

## 2018-07-05 PROCEDURE — 96372 THER/PROPH/DIAG INJ SC/IM: CPT

## 2018-07-05 PROCEDURE — 63600175 PHARM REV CODE 636 W HCPCS: Mod: JG,EC | Performed by: INTERNAL MEDICINE

## 2018-07-05 RX ADMIN — ERYTHROPOIETIN 40000 UNITS: 40000 INJECTION, SOLUTION INTRAVENOUS; SUBCUTANEOUS at 09:07

## 2018-07-05 NOTE — PLAN OF CARE
Problem: Patient Care Overview  Goal: Plan of Care Review  Outcome: Ongoing (interventions implemented as appropriate)  Tolerated Procrit. AVS given to pt.

## 2018-07-09 ENCOUNTER — LAB VISIT (OUTPATIENT)
Dept: LAB | Facility: HOSPITAL | Age: 68
End: 2018-07-09
Attending: INTERNAL MEDICINE
Payer: MEDICARE

## 2018-07-09 DIAGNOSIS — D46.9 MYELODYSPLASTIC SYNDROME: ICD-10-CM

## 2018-07-09 LAB
BASOPHILS # BLD AUTO: 0.02 K/UL
BASOPHILS NFR BLD: 0.4 %
DIFFERENTIAL METHOD: ABNORMAL
EOSINOPHIL # BLD AUTO: 0.1 K/UL
EOSINOPHIL NFR BLD: 1.6 %
ERYTHROCYTE [DISTWIDTH] IN BLOOD BY AUTOMATED COUNT: 19.9 %
FERRITIN SERPL-MCNC: 1369 NG/ML
HCT VFR BLD AUTO: 32.2 %
HGB BLD-MCNC: 10.5 G/DL
IRON SERPL-MCNC: 58 UG/DL
LYMPHOCYTES # BLD AUTO: 1.4 K/UL
LYMPHOCYTES NFR BLD: 25.4 %
MCH RBC QN AUTO: 27 PG
MCHC RBC AUTO-ENTMCNC: 32.6 G/DL
MCV RBC AUTO: 83 FL
MONOCYTES # BLD AUTO: 2.8 K/UL
MONOCYTES NFR BLD: 50.4 %
NEUTROPHILS # BLD AUTO: 1.2 K/UL
NEUTROPHILS NFR BLD: 23.3 %
PLATELET # BLD AUTO: 63 K/UL
PLATELET BLD QL SMEAR: ABNORMAL
PMV BLD AUTO: ABNORMAL FL
RBC # BLD AUTO: 3.89 M/UL
SATURATED IRON: 33 %
TOTAL IRON BINDING CAPACITY: 178 UG/DL
TRANSFERRIN SERPL-MCNC: 120 MG/DL
WBC # BLD AUTO: 5.52 K/UL

## 2018-07-09 PROCEDURE — 85025 COMPLETE CBC W/AUTO DIFF WBC: CPT

## 2018-07-09 PROCEDURE — 36415 COLL VENOUS BLD VENIPUNCTURE: CPT

## 2018-07-09 PROCEDURE — 82728 ASSAY OF FERRITIN: CPT

## 2018-07-09 PROCEDURE — 83540 ASSAY OF IRON: CPT

## 2018-07-11 ENCOUNTER — INFUSION (OUTPATIENT)
Dept: INFUSION THERAPY | Facility: HOSPITAL | Age: 68
End: 2018-07-11
Attending: INTERNAL MEDICINE
Payer: MEDICARE

## 2018-07-11 VITALS
TEMPERATURE: 98 F | DIASTOLIC BLOOD PRESSURE: 70 MMHG | OXYGEN SATURATION: 99 % | RESPIRATION RATE: 16 BRPM | SYSTOLIC BLOOD PRESSURE: 132 MMHG | HEART RATE: 90 BPM

## 2018-07-11 DIAGNOSIS — D46.9 MDS (MYELODYSPLASTIC SYNDROME): Primary | ICD-10-CM

## 2018-07-11 PROCEDURE — 63600175 PHARM REV CODE 636 W HCPCS: Mod: JG,EC | Performed by: INTERNAL MEDICINE

## 2018-07-11 PROCEDURE — 96372 THER/PROPH/DIAG INJ SC/IM: CPT

## 2018-07-11 RX ADMIN — ERYTHROPOIETIN 40000 UNITS: 40000 INJECTION, SOLUTION INTRAVENOUS; SUBCUTANEOUS at 08:07

## 2018-07-11 NOTE — PLAN OF CARE
Problem: Patient Care Overview  Goal: Plan of Care Review  Outcome: Ongoing (interventions implemented as appropriate)  Tolerated Procrit. Pt reports having abd and back pain. Sees Dr. Westbrook tomorrow and will address concerns.

## 2018-07-12 ENCOUNTER — OFFICE VISIT (OUTPATIENT)
Dept: HEMATOLOGY/ONCOLOGY | Facility: CLINIC | Age: 68
End: 2018-07-12
Payer: MEDICARE

## 2018-07-12 VITALS
WEIGHT: 122.94 LBS | HEART RATE: 92 BPM | DIASTOLIC BLOOD PRESSURE: 67 MMHG | TEMPERATURE: 98 F | OXYGEN SATURATION: 98 % | HEIGHT: 69 IN | SYSTOLIC BLOOD PRESSURE: 116 MMHG | BODY MASS INDEX: 18.21 KG/M2

## 2018-07-12 DIAGNOSIS — N43.3 HYDROCELE, UNSPECIFIED HYDROCELE TYPE: ICD-10-CM

## 2018-07-12 DIAGNOSIS — D46.9 MDS (MYELODYSPLASTIC SYNDROME): Primary | ICD-10-CM

## 2018-07-12 DIAGNOSIS — C34.92 ADENOCARCINOMA, LUNG, LEFT: ICD-10-CM

## 2018-07-12 PROCEDURE — 3074F SYST BP LT 130 MM HG: CPT | Mod: CPTII,S$GLB,, | Performed by: INTERNAL MEDICINE

## 2018-07-12 PROCEDURE — 99214 OFFICE O/P EST MOD 30 MIN: CPT | Mod: S$GLB,,, | Performed by: INTERNAL MEDICINE

## 2018-07-12 PROCEDURE — 99999 PR PBB SHADOW E&M-EST. PATIENT-LVL III: CPT | Mod: PBBFAC,,, | Performed by: INTERNAL MEDICINE

## 2018-07-12 PROCEDURE — 99499 UNLISTED E&M SERVICE: CPT | Mod: S$GLB,,, | Performed by: INTERNAL MEDICINE

## 2018-07-12 PROCEDURE — 3078F DIAST BP <80 MM HG: CPT | Mod: CPTII,S$GLB,, | Performed by: INTERNAL MEDICINE

## 2018-07-12 NOTE — Clinical Note
Cbc weekly -STandingorders PRocirt weeky - Per parameters  Follow-up with Dr. Grant next week - established pt  Lt hydrocele

## 2018-07-12 NOTE — PROGRESS NOTES
Subjective:       Patient ID: Glen Arroyo is a 67 y.o. male.    Chief Complaint: Follow-up    HPI   Diagnosis: 1. MDS IPSS-R INT diagnosed 3/8/2018                    2. NSCLC pT2Nx S/p wedge resection 4/17/2018    HPI: Pt is 66y/o male with pmhx of AAA, diverticuliltis, HTN , tobacco abuse and ETOH abuse hospitalized Feb 2018.. He was  referred to ED from his PCP for abnormal labs revealing severe anemia with Hb 6g/dl . Pt reports fatigue and mild generalized weakness past month. No SOB or CP. No N/V. No rashes. No HA/vision changes. No melena, hematochezia or change in bowel habits.No bleeding-rectal/nasal/urinary.  No known prior hx of abnormal blood counts.Pt s/p 2uprbc transfusion. CBC 2/7/2018 reveals wbc 2190/mm3 Hb 8.4g/dL Hct 24.2% Plt 37k.  Reticulocytes were 1.4%. Iron studies showed very high serum iron saturation, but was done post transfusion. Serum B12 was normal. Serum folate was low, but RBC folate was normal. He had normal renal and liver functions. Haptoglobin was low, < 10. LDH was normal. Stool OB was negative. Zinc was low. Copper was normal. SPEP did not show any paraprotein. CHAPO was negative. HIV, HBV, HCV serologies were negative. He underwent bmbx 3/2018 and diagnosed with MDS         He  visited ED with neck pain and HA   Workup included CT chest 2/25/2018 reveals spiculated 2.0 x 0.8 cm nodule in the left lung apex    He is followed by CTS at [facility]  He had left VATS and DEIRDRE wedge resection on 4/17/18. Pathology showed 1.4cm, moderately differentiated invasive adenocarcinoma, VPI present, LVI present, 3mm parenchyml margin, pT2NX      He is undergoing weekly Procrit  9.7g/dl  5/9/2018    10.5g/dl   7/9/2018    He reports he reports lower abd pain and testicular pain  x 2 wks  No fevers  Pain unrelated to meals  Pt reports he hunches over with pillow for relief  Lost to follow-up with Urology  Diagnosed with lt hydrocele  No cough/CP  Less fatigue  No bleeding-nasal/rectal/urinary     He  "reports arthralgias in knees  He is followed by Ortho   He reports he received steroid injection   He ambulates with assitance of cane secondary to pain     He has hx of ETOH abuse  He quit smoking " cold turkey" >6 mos ago            FINAL PATHOLOGIC DIAGNOSIS 3/8/2018   BONE MARROW ASPIRATE SMEARS, TOUCH IMPRINTS, CORE BIOPSY, LEFT ILIAC CREST, AND CLOT  SECTION:  --Hypercellular bone marrow with megakaryocyte atypia and ring sideroblasts, see comment.  --Mildly increased reticulin fibrosis (MF-1).  --Mild polytypic plasmacytosis, 5.6%, see comment.    Cytogenetics abnormal Of 20 metaphases, 13 metaphases were normal and 7 metaphases had an unbalanced 1;7  translocation resulting in a 1q duplication and a 7q deletion.    Findings show myelodysplastic syndrome with single lineage dysplasia     Past Medical History:   Diagnosis Date    AAA (abdominal aortic aneurysm) 11/20/2016    Diverticulitis     HTN (hypertension)        Past Surgical History:   Procedure Laterality Date    ABDOMINAL AORTIC ANEURYSM REPAIR      APPENDECTOMY      COLOSTOMY      PLACEMENT AND REMOVAL- Diveticulitis related     COLOSTOMY      diverticulitis.  removed 2 foot of colon.        NKDA      Current MEDS: Reviewed and as per MEDCHART      Review of Systems   Constitutional: Negative for appetite change, fever and unexpected weight change.   HENT: Negative for mouth sores.    Eyes: Negative for visual disturbance.   Respiratory: Negative for cough and shortness of breath.    Cardiovascular: Negative for chest pain.   Gastrointestinal: Positive for abdominal pain. Negative for diarrhea.   Genitourinary: Positive for testicular pain. Negative for frequency.   Musculoskeletal: Positive for arthralgias. Negative for back pain.   Skin: Negative for rash.   Neurological: Negative for headaches.   Hematological: Negative for adenopathy.   Psychiatric/Behavioral: The patient is not nervous/anxious.        Objective:       Vitals:    07/12/18 " "0806   BP: 116/67   BP Location: Right arm   Patient Position: Sitting   BP Method: Medium (Automatic)   Pulse: 92   Temp: 98.3 °F (36.8 °C)   TempSrc: Oral   SpO2: 98%   Weight: 55.7 kg (122 lb 14.5 oz)   Height: 5' 9" (1.753 m)       Physical Exam   Constitutional: He is oriented to person, place, and time. He appears well-developed and well-nourished.   HENT:   Head: Normocephalic.   Mouth/Throat: Oropharynx is clear and moist. No oropharyngeal exudate.   Eyes: Conjunctivae are normal. Pupils are equal, round, and reactive to light. No scleral icterus.   Neck: Normal range of motion. Neck supple. No thyromegaly present.   Cardiovascular: Normal rate, regular rhythm and normal heart sounds.    No murmur heard.  Pulmonary/Chest: Effort normal and breath sounds normal. He has no wheezes. He has no rales.   Abdominal: Soft. Bowel sounds are normal. He exhibits no distension and no mass. There is no hepatosplenomegaly. There is no tenderness. There is no rebound and no guarding.   Musculoskeletal: Normal range of motion. He exhibits no edema.   Lymphadenopathy:     He has no cervical adenopathy.     He has no axillary adenopathy.        Right: No supraclavicular adenopathy present.        Left: No supraclavicular adenopathy present.   Neurological: He is alert and oriented to person, place, and time. No cranial nerve deficit.   Skin: No rash noted. No erythema.   Psychiatric: He has a normal mood and affect.       Results for FUNMI PALACIOS (MRN [id number]) as of 6/27/2018 11:16   Ref. Range 6/12/2018 11:31   WBC Latest Ref Range: 3.90 - 12.70 K/uL 7.00   RBC Latest Ref Range: 4.60 - 6.20 M/uL 3.24 (L)   Hemoglobin Latest Ref Range: 14.0 - 18.0 g/dL 9.0 (L)   Hematocrit Latest Ref Range: 40.0 - 54.0 % 26.6 (L)   MCV Latest Ref Range: 82 - 98 fL 82   MCH Latest Ref Range: 27.0 - 31.0 pg 27.8   MCHC Latest Ref Range: 32.0 - 36.0 g/dL 33.8   RDW Latest Ref Range: 11.5 - 14.5 % 18.8 (H)   Platelets Latest Ref Range: 150 - " 350 K/uL 72 (L)       Results for FUNMI PALACIOS (MRN [id number]) as of 3/20/2018 14:26   Ref. Range 2/6/2018 15:07 2/7/2018 05:23   Thiamine Latest Ref Range: 38 - 122 ug/L  35 (L)   Vitamin B-12 Latest Ref Range: 210 - 950 pg/mL 280    Results for FUNMI PALACIOS (MRN [id number]) as of 3/20/2018 14:26   Ref. Range 2/7/2018 13:29   Zinc, Serum-ALT Latest Ref Range: 60 - 130 ug/dL 38 (L)   Results for FUNMI PALACIOS (MRN [id number]) as of 3/20/2018 14:26   Ref. Range 2/7/2018 13:29   Copper Latest Ref Range: 665 - 1480 ug/L 1178       Pathology 3/8/2018   FINAL PATHOLOGIC DIAGNOSIS  BONE MARROW ASPIRATE SMEARS, TOUCH IMPRINTS, CORE BIOPSY, LEFT ILIAC CREST, AND CLOT  SECTION:  --Hypercellular bone marrow with megakaryocyte atypia and ring sideroblasts, see comment.  --Mildly increased reticulin fibrosis (MF-1).  --Mild polytypic plasmacytosis, 5.6%, see comment.  COMMENT: The core biopsy is hypercellular for age (50%) and features panhyperplasia. The megakaryocytes are  predominantly small and hypolobated. Blasts are not increased by morphology or in the corresponding flow  cytometric analysis (please see separate report). There is mild erythroid atypia and approximately 7.5% of the  erythroid precursors are ring sideroblasts. Taken together, these findings are concerning for a myelodysplastic  syndrome; however, all other non-neoplastic causes for these features must be excluded, and correlation with the  corresponding cytogenetics analysis is required. Given the presence of ring sideroblasts, NGS including SF3B1  mutational analysis is being performed at [facility], and these results will be reported in a  supplemental report. Additionally, there is a mild polytypic plasmacytosis, which may be seen in association with  bacterial and viral infections, autoimmune conditions, cirrhosis, and in association with neoplastic disorders.    Supplemental Diagnosis  Please also see cytogenetic karyotype results  "reported in Epic from [facility], [address], [place] [address], which give, in part, the following results:  "The result is abnormal. Of 20 metaphases, 13 metaphases were normal and 7 metaphases had an unbalanced 1;7  translocation resulting in a 1q duplication and a 7q deletion. This translocation has been associated with both de  bill and therapy-related MDS and AML (Sandipada M et al., Leukemia 21(5);992-7, 2007)."  These findings support a neoplastic etiology for the morphologic features seen, and this case is best classified as a  myelodysplastic syndrome with single lineage dysplasia pending NGS for SF3B1 mutational analysis.      CT chest w/out contrast 2/25/2018   1.  Spiculated 2.0 x 0.8 cm nodule in the left lung apex on a background of diffuse paraseptal emphysema.  The findings are concerning for primary pulmonary malignancy.  PET/CT scan or tissue sampling is suggested for further evaluation.    2.  Airspace opacities in the bilateral lower lobes.    3. Extensive coronary artery calcifications.  This may be a marker of coronary artery disease    Results for FUNMI PALACIOS (MRN [id number]) as of 6/12/2018 10:43   Ref. Range 6/4/2018 09:31   Iron Latest Ref Range: 45 - 160 ug/dL 41 (L)   TIBC Latest Ref Range: 250 - 450 ug/dL 160 (L)   Saturated Iron Latest Ref Range: 20 - 50 % 26   Transferrin Latest Ref Range: 200 - 375 mg/dL 108 (L)   Ferritin Latest Ref Range: 20.0 - 300.0 ng/mL 1,971 (H)   Erythropoietin Latest Ref Range: 2.6 - 18.5 mIU/mL 90.1 (H)   Results for FUNMI PALACIOS (MRN [id number]) as of 6/27/2018 08:30   Ref. Range 6/4/2018 09:31   Iron Latest Ref Range: 45 - 160 ug/dL 41 (L)   TIBC Latest Ref Range: 250 - 450 ug/dL 160 (L)   Saturated Iron Latest Ref Range: 20 - 50 % 26   Transferrin Latest Ref Range: 200 - 375 mg/dL 108 (L)   Ferritin Latest Ref Range: 20.0 - 300.0 ng/mL 1,971 (H)   Erythropoietin Latest Ref Range: 2.6 - 18.5 mIU/mL 90.1 " (H)     CT head 5/16/2018 -No acute intracranial abnormality.    Results for FUNMI PALACIOS (MRN [id number]) as of 7/12/2018 08:22   Ref. Range 6/4/2018 09:31   Erythropoietin Latest Ref Range: 2.6 - 18.5 mIU/mL 90.1 (H)     Assessment:       1. MDS (myelodysplastic syndrome)    2. Adenocarcinoma, lung, left    3. Hydrocele, unspecified hydrocele type        Plan:   1   67-year-old male with significant comorbidities with  diagnosed MDS 3/18/2018  He has dysplastic changes in his marrow, with ringed sideroblasts comprising 7.5% of the RBC precursors. NGS panel pending. Cytogenetics revealed unbalanced 1;7 translocation resulting in a 1q duplication and a 7q deletion in 7 of the 20 metaphases.Blasts were not increased.  IPSS-R INT  Score is 4.5, putting him in the intermediate risk category. Median time to 25% AML evolution is~3.2 years.I discussed treatment of intermediate risk MDS.   Pt followed by Dr. Orellana  It has been determined that conservative/low intensity treatments with EPO analogues, GCSF, transfusions can be tried to assess response.  Hypomethylating agent is an option as well.  NGS panel showed ASXL1, CBL and SEPBP1 mutations. There are no direct therapeutic implications for these mutations at this time.  There are no clinical trials available here at this time for MDS without excess blasts.  HIV NEG  Hep panel NEG    . Hb  10.5 g/dl  Cont conservative therapy   Plan to cont weekly  EPO inj ( pending lab parameters)   EPO level < 500      2. Adenocarcinoma lung: pT2Nx. S/p wedge resection. He could get EBUS. There was an indeterminate prevascular lymph node on PET CT. Surveillance is needed. Plan Repeat PET CT/ CT in 8/2018 . CT head 5/16/2018 -No acute intracranial abnormality.    3. Follow-up with UROLOGY    Greater than 25 minutes spent during this visit of which greater than 50% devoted to counseling and coordination of care regarding diagnosis and management plan.      Cc Mc Grant MD

## 2018-07-18 ENCOUNTER — INFUSION (OUTPATIENT)
Dept: INFUSION THERAPY | Facility: HOSPITAL | Age: 68
End: 2018-07-18
Attending: INTERNAL MEDICINE
Payer: MEDICARE

## 2018-07-18 VITALS
RESPIRATION RATE: 17 BRPM | TEMPERATURE: 98 F | DIASTOLIC BLOOD PRESSURE: 59 MMHG | SYSTOLIC BLOOD PRESSURE: 118 MMHG | OXYGEN SATURATION: 100 % | HEART RATE: 90 BPM

## 2018-07-18 DIAGNOSIS — D46.9 MDS (MYELODYSPLASTIC SYNDROME): Primary | ICD-10-CM

## 2018-07-18 PROCEDURE — 63600175 PHARM REV CODE 636 W HCPCS: Mod: JG | Performed by: INTERNAL MEDICINE

## 2018-07-18 PROCEDURE — 96372 THER/PROPH/DIAG INJ SC/IM: CPT

## 2018-07-18 RX ADMIN — ERYTHROPOIETIN 40000 UNITS: 40000 INJECTION, SOLUTION INTRAVENOUS; SUBCUTANEOUS at 08:07

## 2018-07-18 NOTE — PLAN OF CARE
Problem: Patient Care Overview  Goal: Plan of Care Review  Outcome: Ongoing (interventions implemented as appropriate)  Tolerated Procrit. Has next appts.

## 2018-07-20 DIAGNOSIS — D46.9 MDS (MYELODYSPLASTIC SYNDROME): ICD-10-CM

## 2018-07-20 DIAGNOSIS — M25.562 LEFT KNEE PAIN, UNSPECIFIED CHRONICITY: ICD-10-CM

## 2018-07-20 DIAGNOSIS — C34.92 ADENOCARCINOMA, LUNG, LEFT: ICD-10-CM

## 2018-07-20 RX ORDER — OXYCODONE AND ACETAMINOPHEN 5; 325 MG/1; MG/1
1 TABLET ORAL EVERY 4 HOURS PRN
Qty: 60 TABLET | Refills: 0 | Status: SHIPPED | OUTPATIENT
Start: 2018-07-20 | End: 2018-08-03 | Stop reason: SDUPTHER

## 2018-07-20 NOTE — TELEPHONE ENCOUNTER
Dr Bailey, pt would like a refill on his Percocet.    He also would like for you to read Dr Westbrook's note from 7/12/18 for an update on his status.

## 2018-07-20 NOTE — TELEPHONE ENCOUNTER
----- Message from Laurie Cary sent at 7/20/2018  8:47 AM CDT -----  Contact: Self  Pt is calling to speak with staff about his health. Please call pt at [phone].

## 2018-07-25 ENCOUNTER — INFUSION (OUTPATIENT)
Dept: INFUSION THERAPY | Facility: HOSPITAL | Age: 68
End: 2018-07-25
Attending: INTERNAL MEDICINE
Payer: MEDICARE

## 2018-07-25 VITALS
RESPIRATION RATE: 16 BRPM | SYSTOLIC BLOOD PRESSURE: 127 MMHG | DIASTOLIC BLOOD PRESSURE: 60 MMHG | TEMPERATURE: 98 F | HEART RATE: 86 BPM | OXYGEN SATURATION: 99 %

## 2018-07-25 DIAGNOSIS — D46.9 MDS (MYELODYSPLASTIC SYNDROME): Primary | ICD-10-CM

## 2018-07-25 PROCEDURE — 63600175 PHARM REV CODE 636 W HCPCS: Mod: JG,EC | Performed by: INTERNAL MEDICINE

## 2018-07-25 PROCEDURE — 96372 THER/PROPH/DIAG INJ SC/IM: CPT

## 2018-07-25 RX ADMIN — ERYTHROPOIETIN 40000 UNITS: 40000 INJECTION, SOLUTION INTRAVENOUS; SUBCUTANEOUS at 08:07

## 2018-07-25 NOTE — PLAN OF CARE
Problem: Patient Care Overview  Goal: Plan of Care Review  Outcome: Ongoing (interventions implemented as appropriate)  Tolerated Procrit. No complaints voiced.

## 2018-08-01 ENCOUNTER — INFUSION (OUTPATIENT)
Dept: INFUSION THERAPY | Facility: HOSPITAL | Age: 68
End: 2018-08-01
Attending: INTERNAL MEDICINE
Payer: MEDICARE

## 2018-08-01 ENCOUNTER — OFFICE VISIT (OUTPATIENT)
Dept: UROLOGY | Facility: CLINIC | Age: 68
End: 2018-08-01
Payer: MEDICARE

## 2018-08-01 VITALS
SYSTOLIC BLOOD PRESSURE: 118 MMHG | HEART RATE: 62 BPM | BODY MASS INDEX: 17.7 KG/M2 | WEIGHT: 119.5 LBS | HEIGHT: 69 IN | DIASTOLIC BLOOD PRESSURE: 70 MMHG

## 2018-08-01 VITALS
OXYGEN SATURATION: 100 % | TEMPERATURE: 98 F | RESPIRATION RATE: 16 BRPM | SYSTOLIC BLOOD PRESSURE: 121 MMHG | DIASTOLIC BLOOD PRESSURE: 68 MMHG | HEART RATE: 85 BPM

## 2018-08-01 DIAGNOSIS — R35.1 NOCTURIA MORE THAN TWICE PER NIGHT: ICD-10-CM

## 2018-08-01 DIAGNOSIS — N13.8 BPH WITH OBSTRUCTION/LOWER URINARY TRACT SYMPTOMS: ICD-10-CM

## 2018-08-01 DIAGNOSIS — D46.9 MDS (MYELODYSPLASTIC SYNDROME): Primary | ICD-10-CM

## 2018-08-01 DIAGNOSIS — N40.1 BPH WITH OBSTRUCTION/LOWER URINARY TRACT SYMPTOMS: ICD-10-CM

## 2018-08-01 DIAGNOSIS — N43.3 LEFT HYDROCELE: Primary | ICD-10-CM

## 2018-08-01 PROCEDURE — 3074F SYST BP LT 130 MM HG: CPT | Mod: CPTII,S$GLB,, | Performed by: UROLOGY

## 2018-08-01 PROCEDURE — 3078F DIAST BP <80 MM HG: CPT | Mod: CPTII,S$GLB,, | Performed by: UROLOGY

## 2018-08-01 PROCEDURE — 99214 OFFICE O/P EST MOD 30 MIN: CPT | Mod: S$GLB,,, | Performed by: UROLOGY

## 2018-08-01 PROCEDURE — 63600175 PHARM REV CODE 636 W HCPCS: Mod: JG,EC | Performed by: INTERNAL MEDICINE

## 2018-08-01 PROCEDURE — 96372 THER/PROPH/DIAG INJ SC/IM: CPT

## 2018-08-01 PROCEDURE — 99999 PR PBB SHADOW E&M-EST. PATIENT-LVL IV: CPT | Mod: PBBFAC,,, | Performed by: UROLOGY

## 2018-08-01 RX ADMIN — ERYTHROPOIETIN 40000 UNITS: 40000 INJECTION, SOLUTION INTRAVENOUS; SUBCUTANEOUS at 08:08

## 2018-08-01 NOTE — H&P (VIEW-ONLY)
Subjective:       Patient ID: Glen Arroyo is a 68 y.o. male who was referred by No ref. provider found    Chief Complaint:   Chief Complaint   Patient presents with    Benign Prostatic Hypertrophy     follow up with scrotal an renal ultrasound            Hydrocele  He has noted a left sided enlargement of his left testicle.   This is painless and has been present for quite some time.  He recalls having a testicle infection about 30 years ago after a bout of diverticulitis.    He is back with an ultrasound.  This bothers him enough to want surgery.      Benign Prostatic Hyperplasia  He patient reports nocturia two times a night and post void dribbling. He denies straining. The patient states symptoms are of mild severity. Onset of symptoms was several years ago and was gradual in onset.  He has no personal history and a family history of prostate cancer with his father and brother. He reports a history of no complicating symptoms. He denies flank pain, gross hematuria, kidney stones and recurrent UTI.  He is currently taking no prostate medications.    ACTIVE MEDICAL ISSUES:  Patient Active Problem List   Diagnosis    AAA (abdominal aortic aneurysm) without rupture    Tobacco dependence in remission    Essential hypertension    Symptomatic anemia    Pancytopenia    Folate deficiency anemia    Weight loss, non-intentional    MDS (myelodysplastic syndrome)    Pulmonary emphysema    Aortic atherosclerosis    Pulmonary nodule, left    Left knee pain    Lung nodule    Adenocarcinoma, lung, left    Primary insomnia       PAST MEDICAL HISTORY  Past Medical History:   Diagnosis Date    AAA (abdominal aortic aneurysm) 11/20/2016    Diverticulitis     HTN (hypertension)        PAST SURGICAL HISTORY:  Past Surgical History:   Procedure Laterality Date    ABDOMINAL AORTIC ANEURYSM REPAIR      APPENDECTOMY      COLOSTOMY      PLACEMENT AND REMOVAL- Diveticulitis related     COLOSTOMY       diverticulitis.  removed 2 foot of colon.        SOCIAL HISTORY:  Social History   Substance Use Topics    Smoking status: Former Smoker     Packs/day: 1.50     Years: 30.00     Types: Cigarettes     Quit date: 12/20/2017    Smokeless tobacco: Never Used      Comment: quit 2 months ago      Alcohol use No       FAMILY HISTORY:  Family History   Problem Relation Age of Onset    Cancer Mother     Cancer Father     Anesthesia problems Neg Hx        ALLERGIES AND MEDICATIONS: updated and reviewed.  Review of patient's allergies indicates:  No Known Allergies  Current Outpatient Prescriptions   Medication Sig    acetaminophen (TYLENOL) 500 MG tablet Take 500 mg by mouth every 6 (six) hours as needed for Pain.    cyanocobalamin (VITAMIN B-12) 100 MCG tablet Take 100 mcg by mouth once daily.    folic acid (FOLVITE) 800 MCG Tab Take 800 mcg by mouth once daily.    mirtazapine (REMERON SOL-TAB) 15 MG disintegrating tablet place 1 tablet ON TONGUE every evening    oxyCODONE-acetaminophen (PERCOCET) 5-325 mg per tablet Take 1 tablet by mouth every 4 (four) hours as needed for Pain (Pain related to surgery and cancer diagnosis).    zinc gluconate 50 mg tablet Take 50 mg by mouth once daily.     No current facility-administered medications for this visit.        Review of Systems   Constitutional: Negative for activity change, fatigue, fever and unexpected weight change.   HENT: Negative for congestion.    Eyes: Negative for redness.   Respiratory: Negative for chest tightness and shortness of breath.    Cardiovascular: Negative for chest pain and leg swelling.   Gastrointestinal: Negative for abdominal pain, constipation, diarrhea, nausea and vomiting.   Genitourinary: Positive for scrotal swelling. Negative for dysuria, flank pain, frequency, hematuria, penile pain, penile swelling, testicular pain and urgency.   Musculoskeletal: Negative for arthralgias and back pain.   Neurological: Negative for dizziness and  "light-headedness.   Psychiatric/Behavioral: Negative for behavioral problems and confusion. The patient is not nervous/anxious.    All other systems reviewed and are negative.      Objective:      Vitals:    08/01/18 1454   BP: 118/70   Pulse: 62   Weight: 54.2 kg (119 lb 7.8 oz)   Height: 5' 9" (1.753 m)     Physical Exam   Nursing note and vitals reviewed.  Constitutional: He is oriented to person, place, and time. He appears well-developed and well-nourished.   HENT:   Head: Normocephalic.   Eyes: Conjunctivae are normal.   Neck: Normal range of motion. Neck supple. No tracheal deviation present. No thyromegaly present.   Cardiovascular: Normal rate and normal heart sounds.    Pulmonary/Chest: Effort normal and breath sounds normal. No respiratory distress. He has no wheezes.   Abdominal: Soft. Bowel sounds are normal. There is no hepatosplenomegaly. There is no tenderness. There is no rebound and no CVA tenderness. No hernia.   Genitourinary: Penis normal. Right testis shows no mass and no tenderness. Left testis shows swelling. Left testis shows no mass and no tenderness. Circumcised.   Genitourinary Comments: Left hydrocele   Musculoskeletal: Normal range of motion. He exhibits no edema or tenderness.   Lymphadenopathy:     He has no cervical adenopathy.   Neurological: He is alert and oriented to person, place, and time.   Skin: Skin is warm and dry. No rash noted. No erythema.     Psychiatric: He has a normal mood and affect. His behavior is normal. Judgment and thought content normal.       Urine dipstick shows negative for all components.  Micro exam: negative for WBC's or RBC's.  US Scrotum And Testicles   Order: [id number]   Status:  Final result   Visible to patient:  No (Not Released)   Next appt:  08/06/2018 at 08:45 AM in Lab (LAB, [facility])   Dx:  Hydrocele, unspecified hydrocele type   Details     Reading Physician Reading Date Result Priority   Amy Friedman MD 3/21/2018    Narrative   "   EXAMINATION:  US SCROTUM AND TESTICLES    CLINICAL HISTORY:  Hydrocele, unspecified    TECHNIQUE:  Sonography of the scrotum and testes.    COMPARISON:  None.    FINDINGS:  Right Testicle:    Size: 4.1 x 2.5 x 3.0 cm    Appearance: Normal    Flow: Normal arterial and venous flow    Epididymis: Normal    Hydrocele: None    Varicocele: None    Left Testicle:    Size: 4.7 by 2.7 x 2.9 cm    Appearance: Normal    Flow: Normal arterial and venous flow    Epididymis: Normal    Hydrocele: Yes    Varicocele: None      Impression       There is a left hydrocele.      Electronically signed by: Amy Friedman MD  Date: 03/21/2018  Time: 10:59              US Retroperitoneal Complete (Kidney and   Order: [id number]   Status:  Final result   Visible to patient:  No (Not Released)   Next appt:  08/06/2018 at 08:45 AM in Lab (LAB, [facility])   Dx:  BPH with obstruction/lower urinary tr...   Details     Reading Physician Reading Date Result Priority   Fritz Sauer MD 3/21/2018    Narrative     EXAMINATION:  US RETROPERITONEAL COMPLETE    CLINICAL HISTORY:  Benign prostatic hyperplasia with lower urinary tract symptoms    TECHNIQUE:  Ultrasound of the kidneys and urinary bladder was performed including color flow and Doppler evaluation of the kidneys.    COMPARISON:  None.    FINDINGS:  Right kidney: The right kidney measures 9.2 x 5.9 x 5.6 cm. No cortical thinning. No loss of corticomedullary distinction. Resistive index measures 0.74.  No mass. No renal stone. No hydronephrosis.    Left kidney: The left kidney measures 10.3 x 6.3 x 5.5 cm. No cortical thinning. No loss of corticomedullary distinction. Resistive index measures 0.87.  There is a cyst within the mid left kidney measuring 2.0 x 2.2 x 2.4 cm.  No renal stone. No hydronephrosis.    Postvoid images of the urinary bladder were obtained with the bladder measuring 4.4 x 2.7 x 3.9 cm corresponding to an estimated postvoid residual within the urinary bladder of 24  mL.  The prostate gland is enlarged measuring 4.0 x 3.7 x 4.9 cm corresponding to an estimated prostate volume of 47 mL.      Impression       2.4 cm cyst within the mid left kidney.    Small postvoid residual within the urinary bladder measuring approximately 24 mL.    Enlarged prostate gland with estimated volume of 47 mL.      Electronically signed by: Fritz Sauer MD  Date: 03/21/2018  Time: 10:49             Assessment:       1. Left hydrocele    2. BPH with obstruction/lower urinary tract symptoms    3. Nocturia more than twice per night          Plan:       1. Left hydrocele  Left hydrocelectomy on Friday 8/17/2018      2. BPH with obstruction/lower urinary tract symptoms  stable    3. Nocturia more than twice per night  stable            Follow-up in about 4 weeks (around 8/29/2018) for Follow up.

## 2018-08-01 NOTE — PLAN OF CARE
Problem: Patient Care Overview  Goal: Plan of Care Review  Outcome: Ongoing (interventions implemented as appropriate)  Patient received Procrit injection. Tolerated well. VSS. Received discharge instructions and verbalized understanding.

## 2018-08-01 NOTE — PROGRESS NOTES
Subjective:       Patient ID: Glen Arroyo is a 68 y.o. male who was referred by No ref. provider found    Chief Complaint:   Chief Complaint   Patient presents with    Benign Prostatic Hypertrophy     follow up with scrotal an renal ultrasound            Hydrocele  He has noted a left sided enlargement of his left testicle.   This is painless and has been present for quite some time.  He recalls having a testicle infection about 30 years ago after a bout of diverticulitis.    He is back with an ultrasound.  This bothers him enough to want surgery.      Benign Prostatic Hyperplasia  He patient reports nocturia two times a night and post void dribbling. He denies straining. The patient states symptoms are of mild severity. Onset of symptoms was several years ago and was gradual in onset.  He has no personal history and a family history of prostate cancer with his father and brother. He reports a history of no complicating symptoms. He denies flank pain, gross hematuria, kidney stones and recurrent UTI.  He is currently taking no prostate medications.    ACTIVE MEDICAL ISSUES:  Patient Active Problem List   Diagnosis    AAA (abdominal aortic aneurysm) without rupture    Tobacco dependence in remission    Essential hypertension    Symptomatic anemia    Pancytopenia    Folate deficiency anemia    Weight loss, non-intentional    MDS (myelodysplastic syndrome)    Pulmonary emphysema    Aortic atherosclerosis    Pulmonary nodule, left    Left knee pain    Lung nodule    Adenocarcinoma, lung, left    Primary insomnia       PAST MEDICAL HISTORY  Past Medical History:   Diagnosis Date    AAA (abdominal aortic aneurysm) 11/20/2016    Diverticulitis     HTN (hypertension)        PAST SURGICAL HISTORY:  Past Surgical History:   Procedure Laterality Date    ABDOMINAL AORTIC ANEURYSM REPAIR      APPENDECTOMY      COLOSTOMY      PLACEMENT AND REMOVAL- Diveticulitis related     COLOSTOMY       diverticulitis.  removed 2 foot of colon.        SOCIAL HISTORY:  Social History   Substance Use Topics    Smoking status: Former Smoker     Packs/day: 1.50     Years: 30.00     Types: Cigarettes     Quit date: 12/20/2017    Smokeless tobacco: Never Used      Comment: quit 2 months ago      Alcohol use No       FAMILY HISTORY:  Family History   Problem Relation Age of Onset    Cancer Mother     Cancer Father     Anesthesia problems Neg Hx        ALLERGIES AND MEDICATIONS: updated and reviewed.  Review of patient's allergies indicates:  No Known Allergies  Current Outpatient Prescriptions   Medication Sig    acetaminophen (TYLENOL) 500 MG tablet Take 500 mg by mouth every 6 (six) hours as needed for Pain.    cyanocobalamin (VITAMIN B-12) 100 MCG tablet Take 100 mcg by mouth once daily.    folic acid (FOLVITE) 800 MCG Tab Take 800 mcg by mouth once daily.    mirtazapine (REMERON SOL-TAB) 15 MG disintegrating tablet place 1 tablet ON TONGUE every evening    oxyCODONE-acetaminophen (PERCOCET) 5-325 mg per tablet Take 1 tablet by mouth every 4 (four) hours as needed for Pain (Pain related to surgery and cancer diagnosis).    zinc gluconate 50 mg tablet Take 50 mg by mouth once daily.     No current facility-administered medications for this visit.        Review of Systems   Constitutional: Negative for activity change, fatigue, fever and unexpected weight change.   HENT: Negative for congestion.    Eyes: Negative for redness.   Respiratory: Negative for chest tightness and shortness of breath.    Cardiovascular: Negative for chest pain and leg swelling.   Gastrointestinal: Negative for abdominal pain, constipation, diarrhea, nausea and vomiting.   Genitourinary: Positive for scrotal swelling. Negative for dysuria, flank pain, frequency, hematuria, penile pain, penile swelling, testicular pain and urgency.   Musculoskeletal: Negative for arthralgias and back pain.   Neurological: Negative for dizziness and  "light-headedness.   Psychiatric/Behavioral: Negative for behavioral problems and confusion. The patient is not nervous/anxious.    All other systems reviewed and are negative.      Objective:      Vitals:    08/01/18 1454   BP: 118/70   Pulse: 62   Weight: 54.2 kg (119 lb 7.8 oz)   Height: 5' 9" (1.753 m)     Physical Exam   Nursing note and vitals reviewed.  Constitutional: He is oriented to person, place, and time. He appears well-developed and well-nourished.   HENT:   Head: Normocephalic.   Eyes: Conjunctivae are normal.   Neck: Normal range of motion. Neck supple. No tracheal deviation present. No thyromegaly present.   Cardiovascular: Normal rate and normal heart sounds.    Pulmonary/Chest: Effort normal and breath sounds normal. No respiratory distress. He has no wheezes.   Abdominal: Soft. Bowel sounds are normal. There is no hepatosplenomegaly. There is no tenderness. There is no rebound and no CVA tenderness. No hernia.   Genitourinary: Penis normal. Right testis shows no mass and no tenderness. Left testis shows swelling. Left testis shows no mass and no tenderness. Circumcised.   Genitourinary Comments: Left hydrocele   Musculoskeletal: Normal range of motion. He exhibits no edema or tenderness.   Lymphadenopathy:     He has no cervical adenopathy.   Neurological: He is alert and oriented to person, place, and time.   Skin: Skin is warm and dry. No rash noted. No erythema.     Psychiatric: He has a normal mood and affect. His behavior is normal. Judgment and thought content normal.       Urine dipstick shows negative for all components.  Micro exam: negative for WBC's or RBC's.  US Scrotum And Testicles   Order: [id number]   Status:  Final result   Visible to patient:  No (Not Released)   Next appt:  08/06/2018 at 08:45 AM in Lab (LAB, [facility])   Dx:  Hydrocele, unspecified hydrocele type   Details     Reading Physician Reading Date Result Priority   Amy Friedman MD 3/21/2018    Narrative   "   EXAMINATION:  US SCROTUM AND TESTICLES    CLINICAL HISTORY:  Hydrocele, unspecified    TECHNIQUE:  Sonography of the scrotum and testes.    COMPARISON:  None.    FINDINGS:  Right Testicle:    Size: 4.1 x 2.5 x 3.0 cm    Appearance: Normal    Flow: Normal arterial and venous flow    Epididymis: Normal    Hydrocele: None    Varicocele: None    Left Testicle:    Size: 4.7 by 2.7 x 2.9 cm    Appearance: Normal    Flow: Normal arterial and venous flow    Epididymis: Normal    Hydrocele: Yes    Varicocele: None      Impression       There is a left hydrocele.      Electronically signed by: Amy Friedman MD  Date: 03/21/2018  Time: 10:59              US Retroperitoneal Complete (Kidney and   Order: [id number]   Status:  Final result   Visible to patient:  No (Not Released)   Next appt:  08/06/2018 at 08:45 AM in Lab (LAB, [facility])   Dx:  BPH with obstruction/lower urinary tr...   Details     Reading Physician Reading Date Result Priority   Fritz Sauer MD 3/21/2018    Narrative     EXAMINATION:  US RETROPERITONEAL COMPLETE    CLINICAL HISTORY:  Benign prostatic hyperplasia with lower urinary tract symptoms    TECHNIQUE:  Ultrasound of the kidneys and urinary bladder was performed including color flow and Doppler evaluation of the kidneys.    COMPARISON:  None.    FINDINGS:  Right kidney: The right kidney measures 9.2 x 5.9 x 5.6 cm. No cortical thinning. No loss of corticomedullary distinction. Resistive index measures 0.74.  No mass. No renal stone. No hydronephrosis.    Left kidney: The left kidney measures 10.3 x 6.3 x 5.5 cm. No cortical thinning. No loss of corticomedullary distinction. Resistive index measures 0.87.  There is a cyst within the mid left kidney measuring 2.0 x 2.2 x 2.4 cm.  No renal stone. No hydronephrosis.    Postvoid images of the urinary bladder were obtained with the bladder measuring 4.4 x 2.7 x 3.9 cm corresponding to an estimated postvoid residual within the urinary bladder of 24  mL.  The prostate gland is enlarged measuring 4.0 x 3.7 x 4.9 cm corresponding to an estimated prostate volume of 47 mL.      Impression       2.4 cm cyst within the mid left kidney.    Small postvoid residual within the urinary bladder measuring approximately 24 mL.    Enlarged prostate gland with estimated volume of 47 mL.      Electronically signed by: Fritz Sauer MD  Date: 03/21/2018  Time: 10:49             Assessment:       1. Left hydrocele    2. BPH with obstruction/lower urinary tract symptoms    3. Nocturia more than twice per night          Plan:       1. Left hydrocele  Left hydrocelectomy on Friday 8/17/2018      2. BPH with obstruction/lower urinary tract symptoms  stable    3. Nocturia more than twice per night  stable            Follow-up in about 4 weeks (around 8/29/2018) for Follow up.

## 2018-08-01 NOTE — H&P
Subjective:       Patient ID: Glen Arroyo is a 68 y.o. male who was referred by No ref. provider found    Chief Complaint:   Chief Complaint   Patient presents with    Benign Prostatic Hypertrophy     follow up with scrotal an renal ultrasound            Hydrocele  He has noted a left sided enlargement of his left testicle.   This is painless and has been present for quite some time.  He recalls having a testicle infection about 30 years ago after a bout of diverticulitis.    He is back with an ultrasound.  This bothers him enough to want surgery.      Benign Prostatic Hyperplasia  He patient reports nocturia two times a night and post void dribbling. He denies straining. The patient states symptoms are of mild severity. Onset of symptoms was several years ago and was gradual in onset.  He has no personal history and a family history of prostate cancer with his father and brother. He reports a history of no complicating symptoms. He denies flank pain, gross hematuria, kidney stones and recurrent UTI.  He is currently taking no prostate medications.    ACTIVE MEDICAL ISSUES:  Patient Active Problem List   Diagnosis    AAA (abdominal aortic aneurysm) without rupture    Tobacco dependence in remission    Essential hypertension    Symptomatic anemia    Pancytopenia    Folate deficiency anemia    Weight loss, non-intentional    MDS (myelodysplastic syndrome)    Pulmonary emphysema    Aortic atherosclerosis    Pulmonary nodule, left    Left knee pain    Lung nodule    Adenocarcinoma, lung, left    Primary insomnia       PAST MEDICAL HISTORY  Past Medical History:   Diagnosis Date    AAA (abdominal aortic aneurysm) 11/20/2016    Diverticulitis     HTN (hypertension)        PAST SURGICAL HISTORY:  Past Surgical History:   Procedure Laterality Date    ABDOMINAL AORTIC ANEURYSM REPAIR      APPENDECTOMY      COLOSTOMY      PLACEMENT AND REMOVAL- Diveticulitis related     COLOSTOMY       diverticulitis.  removed 2 foot of colon.        SOCIAL HISTORY:  Social History   Substance Use Topics    Smoking status: Former Smoker     Packs/day: 1.50     Years: 30.00     Types: Cigarettes     Quit date: 12/20/2017    Smokeless tobacco: Never Used      Comment: quit 2 months ago      Alcohol use No       FAMILY HISTORY:  Family History   Problem Relation Age of Onset    Cancer Mother     Cancer Father     Anesthesia problems Neg Hx        ALLERGIES AND MEDICATIONS: updated and reviewed.  Review of patient's allergies indicates:  No Known Allergies  Current Outpatient Prescriptions   Medication Sig    acetaminophen (TYLENOL) 500 MG tablet Take 500 mg by mouth every 6 (six) hours as needed for Pain.    cyanocobalamin (VITAMIN B-12) 100 MCG tablet Take 100 mcg by mouth once daily.    folic acid (FOLVITE) 800 MCG Tab Take 800 mcg by mouth once daily.    mirtazapine (REMERON SOL-TAB) 15 MG disintegrating tablet place 1 tablet ON TONGUE every evening    oxyCODONE-acetaminophen (PERCOCET) 5-325 mg per tablet Take 1 tablet by mouth every 4 (four) hours as needed for Pain (Pain related to surgery and cancer diagnosis).    zinc gluconate 50 mg tablet Take 50 mg by mouth once daily.     No current facility-administered medications for this visit.        Review of Systems   Constitutional: Negative for activity change, fatigue, fever and unexpected weight change.   HENT: Negative for congestion.    Eyes: Negative for redness.   Respiratory: Negative for chest tightness and shortness of breath.    Cardiovascular: Negative for chest pain and leg swelling.   Gastrointestinal: Negative for abdominal pain, constipation, diarrhea, nausea and vomiting.   Genitourinary: Positive for scrotal swelling. Negative for dysuria, flank pain, frequency, hematuria, penile pain, penile swelling, testicular pain and urgency.   Musculoskeletal: Negative for arthralgias and back pain.   Neurological: Negative for dizziness and  "light-headedness.   Psychiatric/Behavioral: Negative for behavioral problems and confusion. The patient is not nervous/anxious.    All other systems reviewed and are negative.      Objective:      Vitals:    08/01/18 1454   BP: 118/70   Pulse: 62   Weight: 54.2 kg (119 lb 7.8 oz)   Height: 5' 9" (1.753 m)     Physical Exam   Nursing note and vitals reviewed.  Constitutional: He is oriented to person, place, and time. He appears well-developed and well-nourished.   HENT:   Head: Normocephalic.   Eyes: Conjunctivae are normal.   Neck: Normal range of motion. Neck supple. No tracheal deviation present. No thyromegaly present.   Cardiovascular: Normal rate and normal heart sounds.    Pulmonary/Chest: Effort normal and breath sounds normal. No respiratory distress. He has no wheezes.   Abdominal: Soft. Bowel sounds are normal. There is no hepatosplenomegaly. There is no tenderness. There is no rebound and no CVA tenderness. No hernia.   Genitourinary: Penis normal. Right testis shows no mass and no tenderness. Left testis shows swelling. Left testis shows no mass and no tenderness. Circumcised.   Genitourinary Comments: Left hydrocele   Musculoskeletal: Normal range of motion. He exhibits no edema or tenderness.   Lymphadenopathy:     He has no cervical adenopathy.   Neurological: He is alert and oriented to person, place, and time.   Skin: Skin is warm and dry. No rash noted. No erythema.     Psychiatric: He has a normal mood and affect. His behavior is normal. Judgment and thought content normal.       Urine dipstick shows negative for all components.  Micro exam: negative for WBC's or RBC's.  US Scrotum And Testicles   Order: [id number]   Status:  Final result   Visible to patient:  No (Not Released)   Next appt:  08/06/2018 at 08:45 AM in Lab (LAB, [facility])   Dx:  Hydrocele, unspecified hydrocele type   Details     Reading Physician Reading Date Result Priority   Amy Friedman MD 3/21/2018    Narrative   "   EXAMINATION:  US SCROTUM AND TESTICLES    CLINICAL HISTORY:  Hydrocele, unspecified    TECHNIQUE:  Sonography of the scrotum and testes.    COMPARISON:  None.    FINDINGS:  Right Testicle:    Size: 4.1 x 2.5 x 3.0 cm    Appearance: Normal    Flow: Normal arterial and venous flow    Epididymis: Normal    Hydrocele: None    Varicocele: None    Left Testicle:    Size: 4.7 by 2.7 x 2.9 cm    Appearance: Normal    Flow: Normal arterial and venous flow    Epididymis: Normal    Hydrocele: Yes    Varicocele: None      Impression       There is a left hydrocele.      Electronically signed by: Amy Friedman MD  Date: 03/21/2018  Time: 10:59              US Retroperitoneal Complete (Kidney and   Order: [id number]   Status:  Final result   Visible to patient:  No (Not Released)   Next appt:  08/06/2018 at 08:45 AM in Lab (LAB, [facility])   Dx:  BPH with obstruction/lower urinary tr...   Details     Reading Physician Reading Date Result Priority   Fritz Sauer MD 3/21/2018    Narrative     EXAMINATION:  US RETROPERITONEAL COMPLETE    CLINICAL HISTORY:  Benign prostatic hyperplasia with lower urinary tract symptoms    TECHNIQUE:  Ultrasound of the kidneys and urinary bladder was performed including color flow and Doppler evaluation of the kidneys.    COMPARISON:  None.    FINDINGS:  Right kidney: The right kidney measures 9.2 x 5.9 x 5.6 cm. No cortical thinning. No loss of corticomedullary distinction. Resistive index measures 0.74.  No mass. No renal stone. No hydronephrosis.    Left kidney: The left kidney measures 10.3 x 6.3 x 5.5 cm. No cortical thinning. No loss of corticomedullary distinction. Resistive index measures 0.87.  There is a cyst within the mid left kidney measuring 2.0 x 2.2 x 2.4 cm.  No renal stone. No hydronephrosis.    Postvoid images of the urinary bladder were obtained with the bladder measuring 4.4 x 2.7 x 3.9 cm corresponding to an estimated postvoid residual within the urinary bladder of 24  mL.  The prostate gland is enlarged measuring 4.0 x 3.7 x 4.9 cm corresponding to an estimated prostate volume of 47 mL.      Impression       2.4 cm cyst within the mid left kidney.    Small postvoid residual within the urinary bladder measuring approximately 24 mL.    Enlarged prostate gland with estimated volume of 47 mL.      Electronically signed by: Fritz Sauer MD  Date: 03/21/2018  Time: 10:49             Assessment:       1. Left hydrocele    2. BPH with obstruction/lower urinary tract symptoms    3. Nocturia more than twice per night          Plan:       1. Left hydrocele  Left hydrocelectomy on Friday 8/17/2018      2. BPH with obstruction/lower urinary tract symptoms  stable    3. Nocturia more than twice per night  stable            Follow-up in about 4 weeks (around 8/29/2018) for Follow up.

## 2018-08-03 DIAGNOSIS — C34.92 ADENOCARCINOMA, LUNG, LEFT: ICD-10-CM

## 2018-08-03 DIAGNOSIS — M25.562 LEFT KNEE PAIN, UNSPECIFIED CHRONICITY: ICD-10-CM

## 2018-08-03 DIAGNOSIS — D46.9 MDS (MYELODYSPLASTIC SYNDROME): ICD-10-CM

## 2018-08-03 RX ORDER — OXYCODONE AND ACETAMINOPHEN 5; 325 MG/1; MG/1
1 TABLET ORAL EVERY 4 HOURS PRN
Qty: 60 TABLET | Refills: 0 | Status: ON HOLD | OUTPATIENT
Start: 2018-08-03 | End: 2018-08-17 | Stop reason: SDUPTHER

## 2018-08-03 NOTE — TELEPHONE ENCOUNTER
----- Message from Tod York sent at 8/3/2018  3:11 PM CDT -----  Contact: self  Refill: oxyCODONE-acetaminophen (PERCOCET) 5-325 mg per tablet.   [facility] [address] [place] [address] - [address] AT [facility] [facility]. [phone]  Pt has additional questions regarding upcoming procedure  with Dr. Grant.    Pt [phone].    Thanks-

## 2018-08-06 ENCOUNTER — LAB VISIT (OUTPATIENT)
Dept: LAB | Facility: HOSPITAL | Age: 68
End: 2018-08-06
Attending: INTERNAL MEDICINE
Payer: MEDICARE

## 2018-08-06 DIAGNOSIS — D46.9 MYELODYSPLASTIC SYNDROME: ICD-10-CM

## 2018-08-06 LAB
BASOPHILS # BLD AUTO: ABNORMAL K/UL
BASOPHILS NFR BLD: 0 %
DIFFERENTIAL METHOD: ABNORMAL
EOSINOPHIL # BLD AUTO: ABNORMAL K/UL
EOSINOPHIL NFR BLD: 3 %
ERYTHROCYTE [DISTWIDTH] IN BLOOD BY AUTOMATED COUNT: 19.5 %
HCT VFR BLD AUTO: 30.7 %
HGB BLD-MCNC: 9.9 G/DL
LYMPHOCYTES # BLD AUTO: ABNORMAL K/UL
LYMPHOCYTES NFR BLD: 31 %
MCH RBC QN AUTO: 25.5 PG
MCHC RBC AUTO-ENTMCNC: 32.2 G/DL
MCV RBC AUTO: 79 FL
MONOCYTES # BLD AUTO: ABNORMAL K/UL
MONOCYTES NFR BLD: 33 %
NEUTROPHILS NFR BLD: 33 %
PLATELET # BLD AUTO: 82 K/UL
PMV BLD AUTO: ABNORMAL FL
RBC # BLD AUTO: 3.88 M/UL
WBC # BLD AUTO: 9.8 K/UL

## 2018-08-06 PROCEDURE — 85027 COMPLETE CBC AUTOMATED: CPT

## 2018-08-06 PROCEDURE — 36415 COLL VENOUS BLD VENIPUNCTURE: CPT

## 2018-08-06 PROCEDURE — 85007 BL SMEAR W/DIFF WBC COUNT: CPT

## 2018-08-08 ENCOUNTER — INFUSION (OUTPATIENT)
Dept: INFUSION THERAPY | Facility: HOSPITAL | Age: 68
End: 2018-08-08
Attending: INTERNAL MEDICINE
Payer: MEDICARE

## 2018-08-08 VITALS
OXYGEN SATURATION: 100 % | SYSTOLIC BLOOD PRESSURE: 133 MMHG | TEMPERATURE: 98 F | HEART RATE: 75 BPM | DIASTOLIC BLOOD PRESSURE: 60 MMHG | RESPIRATION RATE: 17 BRPM

## 2018-08-08 DIAGNOSIS — D46.9 MDS (MYELODYSPLASTIC SYNDROME): Primary | ICD-10-CM

## 2018-08-08 PROCEDURE — 96372 THER/PROPH/DIAG INJ SC/IM: CPT

## 2018-08-08 PROCEDURE — 63600175 PHARM REV CODE 636 W HCPCS: Mod: JG,EC | Performed by: INTERNAL MEDICINE

## 2018-08-08 RX ADMIN — ERYTHROPOIETIN 40000 UNITS: 40000 INJECTION, SOLUTION INTRAVENOUS; SUBCUTANEOUS at 08:08

## 2018-08-14 ENCOUNTER — HOSPITAL ENCOUNTER (OUTPATIENT)
Dept: PREADMISSION TESTING | Facility: HOSPITAL | Age: 68
Discharge: HOME | End: 2018-08-14
Attending: UROLOGY
Payer: MEDICARE

## 2018-08-14 ENCOUNTER — HOSPITAL ENCOUNTER (OUTPATIENT)
Dept: RADIOLOGY | Facility: HOSPITAL | Age: 68
Discharge: HOME | End: 2018-08-14
Attending: UROLOGY
Payer: MEDICARE

## 2018-08-14 VITALS
HEART RATE: 83 BPM | BODY MASS INDEX: 17.42 KG/M2 | TEMPERATURE: 97 F | WEIGHT: 117.63 LBS | HEIGHT: 69 IN | DIASTOLIC BLOOD PRESSURE: 74 MMHG | SYSTOLIC BLOOD PRESSURE: 119 MMHG | OXYGEN SATURATION: 100 % | RESPIRATION RATE: 17 BRPM

## 2018-08-14 DIAGNOSIS — Z01.818 PRE-OP TESTING: Primary | ICD-10-CM

## 2018-08-14 DIAGNOSIS — N43.3 LEFT HYDROCELE: ICD-10-CM

## 2018-08-14 LAB
ANION GAP SERPL CALC-SCNC: 7 MMOL/L
BUN SERPL-MCNC: 9 MG/DL
CALCIUM SERPL-MCNC: 9.5 MG/DL
CHLORIDE SERPL-SCNC: 101 MMOL/L
CO2 SERPL-SCNC: 26 MMOL/L
CREAT SERPL-MCNC: 0.8 MG/DL
EST. GFR  (AFRICAN AMERICAN): >60 ML/MIN/1.73 M^2
EST. GFR  (NON AFRICAN AMERICAN): >60 ML/MIN/1.73 M^2
GLUCOSE SERPL-MCNC: 97 MG/DL
POTASSIUM SERPL-SCNC: 4.7 MMOL/L
SODIUM SERPL-SCNC: 134 MMOL/L

## 2018-08-14 PROCEDURE — 71046 X-RAY EXAM CHEST 2 VIEWS: CPT | Mod: 26,,, | Performed by: RADIOLOGY

## 2018-08-14 PROCEDURE — 93005 ELECTROCARDIOGRAM TRACING: CPT

## 2018-08-14 PROCEDURE — 71046 X-RAY EXAM CHEST 2 VIEWS: CPT | Mod: TC,FY

## 2018-08-14 PROCEDURE — 36415 COLL VENOUS BLD VENIPUNCTURE: CPT

## 2018-08-14 PROCEDURE — 80048 BASIC METABOLIC PNL TOTAL CA: CPT

## 2018-08-14 PROCEDURE — 93010 ELECTROCARDIOGRAM REPORT: CPT | Mod: ,,, | Performed by: INTERNAL MEDICINE

## 2018-08-14 NOTE — DISCHARGE INSTRUCTIONS
Your surgery is scheduled for_FRIDAY AUGUST 17, 2018________________.    Call [phone] between 2 pm and 5 pm _THURSDAY AUGUST 16, 2018___________ to find out your arrival time for the day of surgery.    Report to SAME DAY SURGERY UNIT at _______am on the 2nd floor of the hospital.  Use the front entrance of the hospital.  The front doors of the hospital open promptly at 5:30 am.    If you need wheelchair assistance, call [phone] from your cell phone,  or call 0 from the courtesy phone in the hospital lobby.    Important instructions:   Do not eat or drink after 12 midnight, including water.  It is okay to brush your teeth.                                                                                                                                                                                                                          Do not have gum, candy or mints.    DO NOT TAKE ANY MEDICATIONS THE AM OF SURGERY      Stop taking Aspirin, Ibuprofen, Motrin and Aleve , Fish oil, and Vitamin E for at least 7 days before your surgery.                                                                                                                                                                                      You may use Tylenol unless otherwise instructed by your doctor.         Prep instructions:   SHOWER   OTHER_____________     Please shower the night before  OR   the morning of your surgery.       No shaving of procedural area at least 4-5 days before surgery due to increased risk of skin irritation and/or possible infection.     You may wear deodorant only.      Do not wear powder, body lotion or cologne.     Do not wear any jewelry or have any metal on your body.     If you are going home on the same day of surgery, you must arrange for a family member or a friend to drive you home.  Public transportation is prohibited.    You will not be able to drive home if you were given anesthesia or  sedation.     Wear loose fitting clothes allowing for bandages.     Please leave money and valuables home.       You may bring your cell phone.     Call the doctor if fever or illness should occur before your surgery.    Call [phone] to contact us here at [facility] if needed.

## 2018-08-15 ENCOUNTER — INFUSION (OUTPATIENT)
Dept: INFUSION THERAPY | Facility: HOSPITAL | Age: 68
End: 2018-08-15
Attending: INTERNAL MEDICINE
Payer: MEDICARE

## 2018-08-15 VITALS
RESPIRATION RATE: 16 BRPM | OXYGEN SATURATION: 99 % | HEART RATE: 74 BPM | SYSTOLIC BLOOD PRESSURE: 130 MMHG | DIASTOLIC BLOOD PRESSURE: 62 MMHG | TEMPERATURE: 98 F

## 2018-08-15 DIAGNOSIS — D46.9 MDS (MYELODYSPLASTIC SYNDROME): Primary | ICD-10-CM

## 2018-08-15 PROCEDURE — 63600175 PHARM REV CODE 636 W HCPCS: Mod: JG,EC | Performed by: INTERNAL MEDICINE

## 2018-08-15 PROCEDURE — 96372 THER/PROPH/DIAG INJ SC/IM: CPT

## 2018-08-15 RX ADMIN — ERYTHROPOIETIN 40000 UNITS: 40000 INJECTION, SOLUTION INTRAVENOUS; SUBCUTANEOUS at 08:08

## 2018-08-17 ENCOUNTER — ANESTHESIA (OUTPATIENT)
Dept: SURGERY | Facility: HOSPITAL | Age: 68
End: 2018-08-17
Payer: MEDICARE

## 2018-08-17 ENCOUNTER — HOSPITAL ENCOUNTER (OUTPATIENT)
Facility: HOSPITAL | Age: 68
Discharge: HOME | End: 2018-08-17
Attending: UROLOGY | Admitting: UROLOGY
Payer: MEDICARE

## 2018-08-17 ENCOUNTER — ANESTHESIA EVENT (OUTPATIENT)
Dept: SURGERY | Facility: HOSPITAL | Age: 68
End: 2018-08-17
Payer: MEDICARE

## 2018-08-17 VITALS
BODY MASS INDEX: 17.33 KG/M2 | WEIGHT: 117 LBS | DIASTOLIC BLOOD PRESSURE: 66 MMHG | HEART RATE: 85 BPM | TEMPERATURE: 98 F | SYSTOLIC BLOOD PRESSURE: 131 MMHG | RESPIRATION RATE: 18 BRPM | OXYGEN SATURATION: 98 % | HEIGHT: 69 IN

## 2018-08-17 DIAGNOSIS — D46.9 MDS (MYELODYSPLASTIC SYNDROME): ICD-10-CM

## 2018-08-17 DIAGNOSIS — C34.92 ADENOCARCINOMA, LUNG, LEFT: ICD-10-CM

## 2018-08-17 DIAGNOSIS — N43.3 LEFT HYDROCELE: ICD-10-CM

## 2018-08-17 DIAGNOSIS — M25.562 LEFT KNEE PAIN, UNSPECIFIED CHRONICITY: ICD-10-CM

## 2018-08-17 PROCEDURE — 36000706: Performed by: UROLOGY

## 2018-08-17 PROCEDURE — 25000003 PHARM REV CODE 250: Performed by: NURSE ANESTHETIST, CERTIFIED REGISTERED

## 2018-08-17 PROCEDURE — 88302 TISSUE EXAM BY PATHOLOGIST: CPT | Performed by: PATHOLOGY

## 2018-08-17 PROCEDURE — 71000033 HC RECOVERY, INTIAL HOUR: Performed by: UROLOGY

## 2018-08-17 PROCEDURE — 55040 REMOVAL OF HYDROCELE: CPT | Mod: LT,,, | Performed by: UROLOGY

## 2018-08-17 PROCEDURE — 63600175 PHARM REV CODE 636 W HCPCS: Performed by: NURSE ANESTHETIST, CERTIFIED REGISTERED

## 2018-08-17 PROCEDURE — 71000039 HC RECOVERY, EACH ADD'L HOUR: Performed by: UROLOGY

## 2018-08-17 PROCEDURE — 63600175 PHARM REV CODE 636 W HCPCS: Performed by: ANESTHESIOLOGY

## 2018-08-17 PROCEDURE — 25000003 PHARM REV CODE 250: Performed by: UROLOGY

## 2018-08-17 PROCEDURE — 27200651 HC AIRWAY, LMA: Performed by: NURSE ANESTHETIST, CERTIFIED REGISTERED

## 2018-08-17 PROCEDURE — 63600175 PHARM REV CODE 636 W HCPCS: Performed by: UROLOGY

## 2018-08-17 PROCEDURE — 88302 TISSUE EXAM BY PATHOLOGIST: CPT | Mod: 26,,, | Performed by: PATHOLOGY

## 2018-08-17 PROCEDURE — D9220A PRA ANESTHESIA: Mod: CRNA,,, | Performed by: NURSE ANESTHETIST, CERTIFIED REGISTERED

## 2018-08-17 PROCEDURE — 71000016 HC POSTOP RECOV ADDL HR: Performed by: UROLOGY

## 2018-08-17 PROCEDURE — 37000008 HC ANESTHESIA 1ST 15 MINUTES: Performed by: UROLOGY

## 2018-08-17 PROCEDURE — 36000707: Performed by: UROLOGY

## 2018-08-17 PROCEDURE — S0020 INJECTION, BUPIVICAINE HYDRO: HCPCS | Performed by: UROLOGY

## 2018-08-17 PROCEDURE — 71000015 HC POSTOP RECOV 1ST HR: Performed by: UROLOGY

## 2018-08-17 PROCEDURE — D9220A PRA ANESTHESIA: Mod: ANES,,, | Performed by: ANESTHESIOLOGY

## 2018-08-17 PROCEDURE — 37000009 HC ANESTHESIA EA ADD 15 MINS: Performed by: UROLOGY

## 2018-08-17 RX ORDER — ONDANSETRON 2 MG/ML
4 INJECTION INTRAMUSCULAR; INTRAVENOUS ONCE AS NEEDED
Status: DISCONTINUED | OUTPATIENT
Start: 2018-08-17 | End: 2018-08-17 | Stop reason: HOSPADM

## 2018-08-17 RX ORDER — CEFAZOLIN SODIUM 2 G/50ML
2 SOLUTION INTRAVENOUS
Status: COMPLETED | OUTPATIENT
Start: 2018-08-17 | End: 2018-08-17

## 2018-08-17 RX ORDER — OXYCODONE HYDROCHLORIDE 5 MG/1
15 TABLET ORAL EVERY 4 HOURS PRN
Status: DISCONTINUED | OUTPATIENT
Start: 2018-08-17 | End: 2018-08-17 | Stop reason: HOSPADM

## 2018-08-17 RX ORDER — SODIUM CHLORIDE, SODIUM LACTATE, POTASSIUM CHLORIDE, CALCIUM CHLORIDE 600; 310; 30; 20 MG/100ML; MG/100ML; MG/100ML; MG/100ML
INJECTION, SOLUTION INTRAVENOUS CONTINUOUS
Status: DISCONTINUED | OUTPATIENT
Start: 2018-08-17 | End: 2018-08-17 | Stop reason: HOSPADM

## 2018-08-17 RX ORDER — OXYCODONE HYDROCHLORIDE 5 MG/1
5 TABLET ORAL EVERY 4 HOURS PRN
Status: DISCONTINUED | OUTPATIENT
Start: 2018-08-17 | End: 2018-08-17 | Stop reason: HOSPADM

## 2018-08-17 RX ORDER — PHENYLEPHRINE HYDROCHLORIDE 10 MG/ML
INJECTION INTRAVENOUS
Status: DISCONTINUED | OUTPATIENT
Start: 2018-08-17 | End: 2018-08-17

## 2018-08-17 RX ORDER — SODIUM CHLORIDE, SODIUM LACTATE, POTASSIUM CHLORIDE, CALCIUM CHLORIDE 600; 310; 30; 20 MG/100ML; MG/100ML; MG/100ML; MG/100ML
INJECTION, SOLUTION INTRAVENOUS CONTINUOUS PRN
Status: DISCONTINUED | OUTPATIENT
Start: 2018-08-17 | End: 2018-08-17

## 2018-08-17 RX ORDER — GLYCOPYRROLATE 0.2 MG/ML
INJECTION INTRAMUSCULAR; INTRAVENOUS
Status: DISCONTINUED | OUTPATIENT
Start: 2018-08-17 | End: 2018-08-17

## 2018-08-17 RX ORDER — ONDANSETRON 2 MG/ML
INJECTION INTRAMUSCULAR; INTRAVENOUS
Status: DISCONTINUED | OUTPATIENT
Start: 2018-08-17 | End: 2018-08-17

## 2018-08-17 RX ORDER — PROPOFOL 10 MG/ML
VIAL (ML) INTRAVENOUS
Status: DISCONTINUED | OUTPATIENT
Start: 2018-08-17 | End: 2018-08-17

## 2018-08-17 RX ORDER — FENTANYL CITRATE 50 UG/ML
25 INJECTION, SOLUTION INTRAMUSCULAR; INTRAVENOUS EVERY 5 MIN PRN
Status: COMPLETED | OUTPATIENT
Start: 2018-08-17 | End: 2018-08-17

## 2018-08-17 RX ORDER — FENTANYL CITRATE 50 UG/ML
INJECTION, SOLUTION INTRAMUSCULAR; INTRAVENOUS
Status: DISCONTINUED | OUTPATIENT
Start: 2018-08-17 | End: 2018-08-17

## 2018-08-17 RX ORDER — OXYCODONE AND ACETAMINOPHEN 5; 325 MG/1; MG/1
1 TABLET ORAL EVERY 4 HOURS PRN
Qty: 30 TABLET | Refills: 0 | Status: SHIPPED | OUTPATIENT
Start: 2018-08-17 | End: 2018-08-24 | Stop reason: SDUPTHER

## 2018-08-17 RX ORDER — COLLODION, FLEXIBLE
LIQUID (ML) MISCELLANEOUS
Status: DISCONTINUED | OUTPATIENT
Start: 2018-08-17 | End: 2018-08-17 | Stop reason: HOSPADM

## 2018-08-17 RX ORDER — CEPHALEXIN 500 MG/1
500 CAPSULE ORAL EVERY 8 HOURS
Qty: 15 CAPSULE | Refills: 0 | Status: SHIPPED | OUTPATIENT
Start: 2018-08-17 | End: 2018-08-22

## 2018-08-17 RX ORDER — LIDOCAINE HCL/PF 100 MG/5ML
SYRINGE (ML) INTRAVENOUS
Status: DISCONTINUED | OUTPATIENT
Start: 2018-08-17 | End: 2018-08-17

## 2018-08-17 RX ORDER — PHENAZOPYRIDINE HYDROCHLORIDE 100 MG/1
200 TABLET, FILM COATED ORAL ONCE
Status: COMPLETED | OUTPATIENT
Start: 2018-08-17 | End: 2018-08-17

## 2018-08-17 RX ORDER — BUPIVACAINE HYDROCHLORIDE 5 MG/ML
INJECTION, SOLUTION PERINEURAL
Status: DISCONTINUED | OUTPATIENT
Start: 2018-08-17 | End: 2018-08-17 | Stop reason: HOSPADM

## 2018-08-17 RX ORDER — SODIUM CHLORIDE 0.9 % (FLUSH) 0.9 %
3 SYRINGE (ML) INJECTION
Status: DISCONTINUED | OUTPATIENT
Start: 2018-08-17 | End: 2018-08-17 | Stop reason: HOSPADM

## 2018-08-17 RX ORDER — MIDAZOLAM HYDROCHLORIDE 1 MG/ML
INJECTION, SOLUTION INTRAMUSCULAR; INTRAVENOUS
Status: DISCONTINUED | OUTPATIENT
Start: 2018-08-17 | End: 2018-08-17

## 2018-08-17 RX ADMIN — OXYCODONE HYDROCHLORIDE 15 MG: 5 TABLET ORAL at 10:08

## 2018-08-17 RX ADMIN — FENTANYL CITRATE 25 MCG: 50 INJECTION, SOLUTION INTRAMUSCULAR; INTRAVENOUS at 09:08

## 2018-08-17 RX ADMIN — FENTANYL CITRATE 25 MCG: 50 INJECTION INTRAMUSCULAR; INTRAVENOUS at 07:08

## 2018-08-17 RX ADMIN — PHENYLEPHRINE HYDROCHLORIDE 100 MCG: 10 INJECTION INTRAVENOUS at 07:08

## 2018-08-17 RX ADMIN — SODIUM CHLORIDE, SODIUM LACTATE, POTASSIUM CHLORIDE, AND CALCIUM CHLORIDE: .6; .31; .03; .02 INJECTION, SOLUTION INTRAVENOUS at 07:08

## 2018-08-17 RX ADMIN — FENTANYL CITRATE 25 MCG: 50 INJECTION, SOLUTION INTRAMUSCULAR; INTRAVENOUS at 08:08

## 2018-08-17 RX ADMIN — LIDOCAINE HYDROCHLORIDE 80 MG: 20 INJECTION, SOLUTION INTRAVENOUS at 07:08

## 2018-08-17 RX ADMIN — PHENAZOPYRIDINE HYDROCHLORIDE 200 MG: 100 TABLET ORAL at 08:08

## 2018-08-17 RX ADMIN — CEFAZOLIN SODIUM 2 G: 2 SOLUTION INTRAVENOUS at 07:08

## 2018-08-17 RX ADMIN — PROPOFOL 150 MG: 10 INJECTION, EMULSION INTRAVENOUS at 07:08

## 2018-08-17 RX ADMIN — MIDAZOLAM HYDROCHLORIDE 1 MG: 1 INJECTION, SOLUTION INTRAMUSCULAR; INTRAVENOUS at 07:08

## 2018-08-17 RX ADMIN — GLYCOPYRROLATE 0.1 MG: 0.2 INJECTION, SOLUTION INTRAMUSCULAR; INTRAVENOUS at 07:08

## 2018-08-17 RX ADMIN — ONDANSETRON 4 MG: 2 INJECTION, SOLUTION INTRAMUSCULAR; INTRAVENOUS at 08:08

## 2018-08-17 NOTE — ANESTHESIA PREPROCEDURE EVALUATION
08/17/2018  Glen Arroyo is a 68 y.o., male   Pre-operative evaluation for Procedure(s) (LRB):  HYDROCELECTOMY (Left)    Glen Arroyo is a 68 y.o. male     Patient Active Problem List   Diagnosis    AAA (abdominal aortic aneurysm) without rupture    Tobacco dependence in remission    Essential hypertension    Symptomatic anemia    Pancytopenia    Folate deficiency anemia    Weight loss, non-intentional    MDS (myelodysplastic syndrome)    Pulmonary emphysema    Aortic atherosclerosis    Pulmonary nodule, left    Left knee pain    Lung nodule    Adenocarcinoma, lung, left    Primary insomnia    Left hydrocele       Review of patient's allergies indicates:  No Known Allergies    No current facility-administered medications on file prior to encounter.      Current Outpatient Medications on File Prior to Encounter   Medication Sig Dispense Refill    cyanocobalamin (VITAMIN B-12) 100 MCG tablet Take 100 mcg by mouth once daily.      folic acid (FOLVITE) 800 MCG Tab Take 800 mcg by mouth once daily.      zinc gluconate 50 mg tablet Take 50 mg by mouth once daily.      acetaminophen (TYLENOL) 500 MG tablet Take 500 mg by mouth every 6 (six) hours as needed for Pain.      mirtazapine (REMERON SOL-TAB) 15 MG disintegrating tablet place 1 tablet ON TONGUE every evening 30 tablet 0       Past Surgical History:   Procedure Laterality Date    AANGIOPLASTY      STENT    ABDOMINAL AORTIC ANEURYSM REPAIR      APPENDECTOMY      CARDIAC SURGERY      COLOSTOMY      PLACEMENT AND REMOVAL- Diveticulitis related     COLOSTOMY      diverticulitis.  removed 2 foot of colon.     LUNG CANCER SURGERY Left     WEDGE RESECTION    TONSILLECTOMY         Social History     Socioeconomic History    Marital status: Single     Spouse name: Not on file    Number of children: Not on file    Years of  education: Not on file    Highest education level: Not on file   Social Needs    Financial resource strain: Not on file    Food insecurity - worry: Not on file    Food insecurity - inability: Not on file    Transportation needs - medical: Not on file    Transportation needs - non-medical: Not on file   Occupational History    Not on file   Tobacco Use    Smoking status: Former Smoker     Packs/day: 1.50     Years: 30.00     Pack years: 45.00     Types: Cigarettes     Last attempt to quit: 2017     Years since quittin.6    Smokeless tobacco: Never Used    Tobacco comment: quit 2 months ago     Substance and Sexual Activity    Alcohol use: Yes     Comment: SOCIALLY    Drug use: No    Sexual activity: Not Currently   Other Topics Concern    Not on file   Social History Narrative    Not on file     CMP:   Recent Labs      18   0945   NA  134*   K  4.7   CL  101   CO2  26   BUN  9   CREATININE  0.8   GLU  97   CALCIUM  9.5     Anesthesia Evaluation    I have reviewed the Patient Summary Reports.     I have reviewed the Medications.     Review of Systems  Anesthesia Hx:  No problems with previous Anesthesia Denies Hx of Anesthetic complications  History of prior surgery of interest to airway management or planning: Denies Family Hx of Anesthesia complications.   Denies Personal Hx of Anesthesia complications.   Social:  No Alcohol Use, Former Smoker, Non-Smoker    Hematology/Oncology:  Hematology Normal       -- Cancer in past history:  Other (see Oncology comments) Oncology Comments: Hx of adenocarcinoma in the lung, s/p resection     EENT/Dental:EENT/Dental Normal   Cardiovascular:   Exercise tolerance: good Hypertension Hx AAA which was repaired   Pulmonary:   COPD    Renal/:  Renal/ Normal     Hepatic/GI:  Hepatic/GI Normal    Neurological:  Neurology Normal    Endocrine:  Endocrine Normal    Psych:  Psychiatric Normal           Physical Exam  General:  Well nourished     Airway/Jaw/Neck:  Airway Findings: Mouth Opening: Normal Tongue: Normal  General Airway Assessment: Adult, Average  Mallampati: II  TM Distance: Normal, at least 6 cm  Jaw/Neck Findings:  Neck ROM: Normal ROM      Dental:  Dental Findings: In tact   Chest/Lungs:  Chest/Lungs Findings: Normal Respiratory Rate, Clear to auscultation     Heart/Vascular:  Heart Findings: Rate: Normal  Rhythm: Regular Rhythm        Mental Status:  Mental Status Findings:  Alert and Oriented, Cooperative         Anesthesia Plan  Type of Anesthesia, risks & benefits discussed:  Anesthesia Type:  general  Patient's Preference:   Intra-op Monitoring Plan: standard ASA monitors  Intra-op Monitoring Plan Comments:   Post Op Pain Control Plan: multimodal analgesia and IV/PO Opioids PRN  Post Op Pain Control Plan Comments:   Induction:   IV  Beta Blocker:  Patient is not currently on a Beta-Blocker (No further documentation required).       Informed Consent: Patient understands risks and agrees with Anesthesia plan.  Questions answered. Anesthesia consent signed with patient.  ASA Score: 3     Day of Surgery Review of History & Physical:    H&P update referred to the surgeon.         Ready For Surgery From Anesthesia Perspective.

## 2018-08-17 NOTE — ANESTHESIA POSTPROCEDURE EVALUATION
"Anesthesia Post Evaluation    Patient: Glen Arroyo    Procedure(s) Performed: Procedure(s) (LRB):  HYDROCELECTOMY (Left)    Final Anesthesia Type: general  Patient location during evaluation: PACU  Patient participation: Yes- Able to Participate  Level of consciousness: awake and alert and oriented  Post-procedure vital signs: reviewed and stable  Pain management: adequate  Airway patency: patent  PONV status at discharge: No PONV  Anesthetic complications: no      Cardiovascular status: blood pressure returned to baseline and hemodynamically stable  Respiratory status: unassisted, spontaneous ventilation and room air  Hydration status: euvolemic  Follow-up not needed.        Visit Vitals  BP (!) 144/69   Pulse 86   Temp 36.6 °C (97.8 °F)   Resp 15   Ht 5' 9" (1.753 m)   Wt 53.1 kg (117 lb)   SpO2 100%   BMI 17.28 kg/m²       Pain/Salvador Score: Pain Assessment Performed: Yes (8/17/2018  8:16 AM)  Presence of Pain: non-verbal indicators absent (8/17/2018  8:16 AM)  Pain Rating Prior to Med Admin: 7 (8/17/2018  8:48 AM)  Salvador Score: 8 (8/17/2018  8:16 AM)        "

## 2018-08-17 NOTE — BRIEF OP NOTE
[facility]  Brief Operative Note     SUMMARY     Surgery Date: 8/17/2018     Surgeon(s) and Role:     * TIFFANIE Grant MD - Primary    Assisting Surgeon: None    Pre-op Diagnosis:  Left hydrocele [N43.3]    Post-op Diagnosis:  Post-Op Diagnosis Codes:     * Left hydrocele [N43.3]    Procedure(s) (LRB):  HYDROCELECTOMY (Left)    Anesthesia: General    Description of the findings of the procedure: Left hydrocele, 75 mL fluid    Findings/Key Components: as above    Estimated Blood Loss: * No values recorded between 8/17/2018  7:43 AM and 8/17/2018  8:08 AM *         Specimens:   Specimen (12h ago, onward)    Start     Ordered    08/17/18 0806  Specimen to Pathology - Surgery  Once     Comments:  Hydrocoele sac, Left     Start Status   08/17/18 0806 Needs to be Collected       08/17/18 0806    08/17/18 0805  Specimen to Pathology - Surgery  Once,   Status:  Canceled     Comments:  Hydrocoele sac     Start Status   08/17/18 0805 Canceled       08/17/18 0805          Discharge Note    SUMMARY     Admit Date: 8/17/2018    Discharge Date and Time:  08/17/2018 8:08 AM    Hospital Course (synopsis of major diagnoses, care, treatment, and services provided during the course of the hospital stay): Patient was admitted for an outpatient procedure and tolerated the procedure well with no complications.      Final Diagnosis: Post-Op Diagnosis Codes:     * Left hydrocele [N43.3]    Disposition: Home or Self Care    Follow Up/Patient Instructions:     Medications:  Reconciled Home Medications:      Medication List      CONTINUE taking these medications    acetaminophen 500 MG tablet  Commonly known as:  TYLENOL  Take 500 mg by mouth every 6 (six) hours as needed for Pain.     folic acid 800 MCG Tab  Commonly known as:  FOLVITE  Take 800 mcg by mouth once daily.     mirtazapine 15 MG disintegrating tablet  Commonly known as:  REMERON SOL-TAB  place 1 tablet ON TONGUE every evening     oxyCODONE-acetaminophen  5-325 mg per tablet  Commonly known as:  PERCOCET  Take 1 tablet by mouth every 4 (four) hours as needed for Pain (Pain related to surgery and cancer diagnosis).     PROCRIT INJ  Inject as directed once a week.     VITAMIN B-12 100 MCG tablet  Generic drug:  cyanocobalamin  Take 100 mcg by mouth once daily.     zinc gluconate 50 mg tablet  Take 50 mg by mouth once daily.          Discharge Procedure Orders   Diet general     Call MD for:   Order Comments: Severe pain or fever     Follow-up Information     W Mc Grant MD In 2 weeks.    Specialty:  Urology  Why:  Post op Check  Contact information:  [address]  [address]  [facility] [address] [phone]

## 2018-08-17 NOTE — TRANSFER OF CARE
"Anesthesia Transfer of Care Note    Patient: Glen Arroyo    Procedure(s) Performed: Procedure(s) (LRB):  HYDROCELECTOMY (Left)    Patient location: PACU    Anesthesia Type: general    Transport from OR: Transported from OR on room air with adequate spontaneous ventilation    Post pain: adequate analgesia    Post assessment: no apparent anesthetic complications    Post vital signs: stable    Level of consciousness: awake, alert and oriented    Nausea/Vomiting: no nausea/vomiting    Complications: none    Transfer of care protocol was followed      Last vitals:   Visit Vitals  BP (!) 144/69   Pulse 87   Temp 36.6 °C (97.8 °F)   Resp 16   Ht 5' 9" (1.753 m)   Wt 53.1 kg (117 lb)   SpO2 100%   BMI 17.28 kg/m²     "

## 2018-08-17 NOTE — OP NOTE
DATE OF PROCEDURE:  08/17/2018.    PREOPERATIVE DIAGNOSIS:  Left hydrocele.    POSTOPERATIVE DIAGNOSIS:  Left hydrocele.    PROCEDURE PERFORMED:  Left hydrocelectomy.    PRIMARY SURGEON:  John Grant M.D.    ANESTHESIA:  General.    ESTIMATED BLOOD LOSS:  Minimal.    DRAINS:  None.    COMPLICATIONS:  None.    INDICATIONS:  Glen Arroyo is a 68-year-old male with a left-sided hydrocele.    It is uncomfortable for him and he has requested that the hydrocele be   repaired.    Glen Arroyo was taken to the Operating Room where he was positively   identified by tiera.  He was placed supine on the operating room table.    Following induction of adequate general anesthesia, he was left in the supine   position and his external genitalia were clipped and prepped and draped in the   usual sterile fashion.    A preoperative timeout was performed as well as confirmation of preoperative   antibiotics and preoperative marking on the left side.    The left inguinal block was performed with 0.5% Marcaine plain mixed with 1%   lidocaine plain.  A 10 mL were used.    I then made a 3 cm midline scrotal incision.    I dissected down through dartos fascia.    I then was able to identify the tunica vaginalis of the left hydrocele sac.    This was dissected away from the dartos tissues.    The dartos sac was then opened and approximately 75 mL of straw-colored clear   fluid was evacuated.    The hydrocele sac was further opened, taking care not to injure the testicle.  A   portion of the hydrocele sac was excised and sent off for pathologic analysis.    The hydrocele sac was then inverted and the edges were oversewn with 3-0 chromic   suture in a running locking fashion.    Hemostasis was deemed adequate.    The testicle was then placed back into the left hemiscrotum.    The dartos fascia was then reapproximated with 2-0 chromic suture in a running   locking fashion.    The skin was then closed with 3-0 chromic suture in  a running locking fashion.    Collodion solution was applied to the incision.    Sponge counts, needle counts and instrument counts were reported correct at the   end of the case.    His anesthesia was reversed.  He was taken to the Recovery Room in stable   condition.      SHANTEL  dd: 08/17/2018 08:12:23 (CDT)  td: 08/17/2018 08:30:09 (CDT)  Doc ID   #[id number]  Job ID #[id number]    CC:

## 2018-08-17 NOTE — INTERVAL H&P NOTE
The patient has been examined and the H&P has been reviewed:    I concur with the findings and no changes have occurred since H&P was written.  Patient marked on left side    Anesthesia/Surgery risks, benefits and alternative options discussed and understood by patient/family.          Active Hospital Problems    Diagnosis  POA    Left hydrocele [N43.3]  Yes      Resolved Hospital Problems   No resolved problems to display.

## 2018-08-17 NOTE — DISCHARGE INSTRUCTIONS
FOLLOW ALL INSTRUCTIONS GIVEN BY DR. FERNANDEZ    BATHING/DRESSING:  ? Ok to shower in 2 days no  Tub bath  ? ACTIVITY LEVEL: If you have received sedation or an anesthetic, you may feel sleepy for several hours. Rest until you are more awake. Gradually resume your normal activities.    No heavy lifting.      DIET: You may resume your home diet. If nausea is present, increase your diet gradually with fluids and bland foods.  Medications: Pain medication should be taken only if needed and as directed. If antibiotics are prescribed, the medication should be taken until completed. You   will be given an updated list of you medications.    LAST DOSE OF PAIN MEDICATION 10:01 AM, last dose of pyridium 8:47 am  ? No driving, alcoholic beverages or signing legal documents for next 24 hours or while taking pain medication    CALL THE DOCTOR:    For any obvious bleeding (some dried blood over the incision is normal).    Some blood in your urine is normal.     Redness, swelling, foul smell around incision or fever over 101.   Shortness of breath, Coughing Up Bloody Sputum, or Pains or Swelling in your Calves..   Persistent pain or nausea not relieved by medication.   Problems urinating - unable to urinate or heavy bleeding (with our without clots) in urine.    If any unusual problems or difficulties occur contact your doctor. If you cannot contact your doctor but feel your signs and symptoms warrant a physicians attention return to the emergency room.  Fall Prevention  Millions of people fall every year and injure themselves. You may have had anesthesia or sedation which may increase your risk of falling. You may have health issues that put you at an increased risk of falling.     Here are ways to reduce your risk of falling.  ·   · Make your home safe by keeping walkways clear of objects you may trip over.  · Use non-slip pads under rugs. Do not use area rugs or small throw rugs.  · Use non-slip mats in bathtubs and  showers.  · Install handrails and lights on staircases.  · Do not walk in poorly lit areas.  · Do not stand on chairs or wobbly ladders.  · Use caution when reaching overhead or looking upward. This position can cause a loss of balance.  · Be sure your shoes fit properly, have non-slip bottoms and are in good condition.   · Wear shoes both inside and out. Avoid going barefoot or wearing slippers.  · Be cautious when going up and down stairs, curbs, and when walking on uneven sidewalks.  · If your balance is poor, consider using a cane or walker.  · If your fall was related to alcohol use, stop or limit alcohol intake.   · If your fall was related to use of sleeping medicines, talk to your doctor about this. You may need to reduce your dosage at bedtime if you awaken during the night to go to the bathroom.    · To reduce the need for nighttime bathroom trips:  ¨ Avoid drinking fluids for several hours before going to bed  ¨ Empty your bladder before going to bed  ¨ Men can keep a urinal at the bedside  · Stay as active as you can. Balance, flexibility, strength, and endurance all come from exercise. They all play a role in preventing falls. Ask your healthcare provider which types of activity are right for you.  · Get your vision checked on a regular basis.  · If you have pets, know where they are before you stand up or walk so you don't trip over them.  · Use night lights.

## 2018-08-17 NOTE — DISCHARGE SUMMARY
[facility]  Discharge Note  Short Stay    Admit Date: 8/17/2018    Discharge Date and Time: 08/17/2018 8:08 AM      Attending Physician: TIFFANIE Grant MD     Discharge Provider: NESSA Grant    Diagnoses:  Active Hospital Problems    Diagnosis  POA    *Left hydrocele [N43.3]  Yes      Resolved Hospital Problems   No resolved problems to display.       Discharged Condition: stable    Hospital Course: Patient was admitted for an outpatient procedure and tolerated the procedure well with no complications.    Final Diagnoses: Same as principal problem.    Disposition: Home or Self Care    Follow up/Patient Instructions:    Medications:  Reconciled Home Medications:      Medication List      CONTINUE taking these medications    acetaminophen 500 MG tablet  Commonly known as:  TYLENOL  Take 500 mg by mouth every 6 (six) hours as needed for Pain.     folic acid 800 MCG Tab  Commonly known as:  FOLVITE  Take 800 mcg by mouth once daily.     mirtazapine 15 MG disintegrating tablet  Commonly known as:  REMERON SOL-TAB  place 1 tablet ON TONGUE every evening     oxyCODONE-acetaminophen 5-325 mg per tablet  Commonly known as:  PERCOCET  Take 1 tablet by mouth every 4 (four) hours as needed for Pain (Pain related to surgery and cancer diagnosis).     PROCRIT INJ  Inject as directed once a week.     VITAMIN B-12 100 MCG tablet  Generic drug:  cyanocobalamin  Take 100 mcg by mouth once daily.     zinc gluconate 50 mg tablet  Take 50 mg by mouth once daily.          Discharge Procedure Orders   Diet general     Call MD for:   Order Comments: Severe pain or fever     Follow-up Information     NESSA Grant MD In 2 weeks.    Specialty:  Urology  Why:  Post op Check  Contact information:  [address]  [address] [place] [address] [id number] [phone]                   Discharge Procedure Orders (must include Diet, Follow-up, Activity):   Discharge Procedure Orders (must include Diet, Follow-up, Activity)   Diet  general     Call MD for:   Order Comments: Severe pain or fever

## 2018-08-22 ENCOUNTER — OFFICE VISIT (OUTPATIENT)
Dept: HEMATOLOGY/ONCOLOGY | Facility: CLINIC | Age: 68
End: 2018-08-22
Payer: MEDICARE

## 2018-08-22 ENCOUNTER — INFUSION (OUTPATIENT)
Dept: INFUSION THERAPY | Facility: HOSPITAL | Age: 68
End: 2018-08-22
Attending: INTERNAL MEDICINE
Payer: MEDICARE

## 2018-08-22 VITALS
HEART RATE: 62 BPM | DIASTOLIC BLOOD PRESSURE: 56 MMHG | OXYGEN SATURATION: 100 % | SYSTOLIC BLOOD PRESSURE: 107 MMHG | TEMPERATURE: 97 F | RESPIRATION RATE: 16 BRPM

## 2018-08-22 VITALS
HEART RATE: 70 BPM | HEIGHT: 69 IN | WEIGHT: 116.94 LBS | BODY MASS INDEX: 17.32 KG/M2 | SYSTOLIC BLOOD PRESSURE: 106 MMHG | OXYGEN SATURATION: 99 % | DIASTOLIC BLOOD PRESSURE: 60 MMHG | TEMPERATURE: 98 F

## 2018-08-22 DIAGNOSIS — D46.9 MDS (MYELODYSPLASTIC SYNDROME): Primary | ICD-10-CM

## 2018-08-22 DIAGNOSIS — C34.92 ADENOCARCINOMA, LUNG, LEFT: ICD-10-CM

## 2018-08-22 DIAGNOSIS — D63.0 ANEMIA IN NEOPLASTIC DISEASE: ICD-10-CM

## 2018-08-22 PROCEDURE — 3078F DIAST BP <80 MM HG: CPT | Mod: CPTII,S$GLB,, | Performed by: INTERNAL MEDICINE

## 2018-08-22 PROCEDURE — 99999 PR PBB SHADOW E&M-EST. PATIENT-LVL IV: CPT | Mod: PBBFAC,,, | Performed by: INTERNAL MEDICINE

## 2018-08-22 PROCEDURE — 96372 THER/PROPH/DIAG INJ SC/IM: CPT

## 2018-08-22 PROCEDURE — 3074F SYST BP LT 130 MM HG: CPT | Mod: CPTII,S$GLB,, | Performed by: INTERNAL MEDICINE

## 2018-08-22 PROCEDURE — 63600175 PHARM REV CODE 636 W HCPCS: Mod: JG | Performed by: INTERNAL MEDICINE

## 2018-08-22 PROCEDURE — 99214 OFFICE O/P EST MOD 30 MIN: CPT | Mod: S$GLB,,, | Performed by: INTERNAL MEDICINE

## 2018-08-22 PROCEDURE — 99499 UNLISTED E&M SERVICE: CPT | Mod: S$GLB,,, | Performed by: INTERNAL MEDICINE

## 2018-08-22 RX ADMIN — ERYTHROPOIETIN 40000 UNITS: 40000 INJECTION, SOLUTION INTRAVENOUS; SUBCUTANEOUS at 08:08

## 2018-08-22 NOTE — PROGRESS NOTES
Subjective:       Patient ID: Glen Arroyo is a 68 y.o. male.    Chief Complaint: Follow-up    HPI   Diagnosis: 1. MDS IPSS-R INT diagnosed 3/8/2018                    2. NSCLC pT2Nx S/p wedge resection 4/17/2018    HPI: Pt is 69 y/o male with pmhx of AAA, diverticuliltis, HTN , tobacco abuse and ETOH abuse hospitalized Feb 2018.. He was  referred to ED from his PCP for abnormal labs revealing severe anemia with Hb 6g/dl . Pt reports fatigue and mild generalized weakness past month. No SOB or CP. No N/V. No rashes. No HA/vision changes. No melena, hematochezia or change in bowel habits.No bleeding-rectal/nasal/urinary.  No known prior hx of abnormal blood counts.Pt s/p 2uprbc transfusion. CBC 2/7/2018 reveals wbc 2190/mm3 Hb 8.4g/dL Hct 24.2% Plt 37k.  Reticulocytes were 1.4%. Iron studies showed very high serum iron saturation, but was done post transfusion. Serum B12 was normal. Serum folate was low, but RBC folate was normal. He had normal renal and liver functions. Haptoglobin was low, < 10. LDH was normal. Stool OB was negative. Zinc was low. Copper was normal. SPEP did not show any paraprotein. CHAPO was negative. HIV, HBV, HCV serologies were negative. He underwent bmbx 3/2018 and diagnosed with MDS         He  visited ED with neck pain and HA   Workup included CT chest 2/25/2018 reveals spiculated 2.0 x 0.8 cm nodule in the left lung apex    He is [place] [facility] [place]  He had left VATS and DEIRDRE wedge resection on 4/17/18. Pathology showed 1.4cm, moderately differentiated invasive adenocarcinoma, VPI present, LVI present, 3mm parenchyml margin, pT2NX      He is undergoing weekly Procrit  9.7g/dl  5/9/2018  To   10.6g/dl   8/20/2018      Patient followed by Urology and recently underwent   Left hydrocelectomy for Left hydrocele.  He  no longer has lower abdominal pain or testicular pain  since surgery    No cough/CP  No SOB  No hemoptysis   Less fatigued  No bleeding-nasal/rectal/urinary     He reports  "arthralgias in knees  He is followed by Ortho   He reports he received steroid injection   He ambulates with assitance of cane secondary to pain     He has hx of ETOH abuse  He quit smoking " cold turkey" >6 mos ago      FINAL PATHOLOGIC DIAGNOSIS 3/8/2018   BONE MARROW ASPIRATE SMEARS, TOUCH IMPRINTS, CORE BIOPSY, LEFT ILIAC CREST, AND CLOT  SECTION:  --Hypercellular bone marrow with megakaryocyte atypia and ring sideroblasts, see comment.  --Mildly increased reticulin fibrosis (MF-1).  --Mild polytypic plasmacytosis, 5.6%, see comment.    Cytogenetics abnormal Of 20 metaphases, 13 metaphases were normal and 7 metaphases had an unbalanced 1;7  translocation resulting in a 1q duplication and a 7q deletion.    Findings show myelodysplastic syndrome with single lineage dysplasia     Past Medical History:   Diagnosis Date    AAA (abdominal aortic aneurysm) 11/20/2016    Cancer     LUNG     LEFT    Diverticulitis     HTN (hypertension)     NO LONGER TAKING MEDS    MDS (myelodysplastic syndrome)        Past Surgical History:   Procedure Laterality Date    AANGIOPLASTY      STENT    ABDOMINAL AORTIC ANEURYSM REPAIR      APPENDECTOMY      CARDIAC SURGERY      COLOSTOMY      PLACEMENT AND REMOVAL- Diveticulitis related     COLOSTOMY      diverticulitis.  removed 2 foot of colon.     LUNG CANCER SURGERY Left     WEDGE RESECTION    TONSILLECTOMY         NKDA      Current MEDS: Reviewed and as per MEDCHART      Review of Systems   Constitutional: Negative for appetite change, fever and unexpected weight change.   HENT: Negative for mouth sores.    Eyes: Negative for visual disturbance.   Respiratory: Negative for cough and shortness of breath.    Cardiovascular: Negative for chest pain.   Gastrointestinal: Negative for abdominal pain and diarrhea.   Genitourinary: Negative for frequency and testicular pain.   Musculoskeletal: Positive for arthralgias. Negative for back pain.   Skin: Negative for rash. " "  Neurological: Negative for headaches.   Hematological: Negative for adenopathy.   Psychiatric/Behavioral: The patient is not nervous/anxious.        Objective:       Vitals:    08/22/18 0908   BP: 106/60   BP Location: Right arm   Patient Position: Sitting   BP Method: Medium (Manual)   Pulse: 70   Temp: 97.8 °F (36.6 °C)   TempSrc: Oral   SpO2: 99%   Weight: 53 kg (116 lb 15.3 oz)   Height: 5' 9" (1.753 m)       Physical Exam   Constitutional: He is oriented to person, place, and time. He appears well-developed and well-nourished.   HENT:   Head: Normocephalic.   Mouth/Throat: Oropharynx is clear and moist. No oropharyngeal exudate.   Eyes: Conjunctivae are normal. Pupils are equal, round, and reactive to light. No scleral icterus.   Neck: Normal range of motion. Neck supple. No thyromegaly present.   Cardiovascular: Normal rate, regular rhythm and normal heart sounds.   No murmur heard.  Pulmonary/Chest: Effort normal and breath sounds normal. He has no wheezes. He has no rales.   Abdominal: Soft. Bowel sounds are normal. He exhibits no distension and no mass. There is no hepatosplenomegaly. There is no tenderness. There is no rebound and no guarding.   Musculoskeletal: Normal range of motion. He exhibits no edema.   Lymphadenopathy:     He has no cervical adenopathy.     He has no axillary adenopathy.        Right: No supraclavicular adenopathy present.        Left: No supraclavicular adenopathy present.   Neurological: He is alert and oriented to person, place, and time. No cranial nerve deficit.   Skin: No rash noted. No erythema.   Psychiatric: He has a normal mood and affect.           Results for FUNMI PALACIOS (MRN [id number]) as of 3/20/2018 14:26   Ref. Range 2/6/2018 15:07 2/7/2018 05:23   Thiamine Latest Ref Range: 38 - 122 ug/L  35 (L)   Vitamin B-12 Latest Ref Range: 210 - 950 pg/mL 280    Results for FUNMI PALACIOS (MRN [id number]) as of 3/20/2018 14:26   Ref. Range 2/7/2018 13:29   Zinc, Serum-ALT " Latest Ref Range: 60 - 130 ug/dL 38 (L)   Results for FUNMI PALACIOS (MRN [id number]) as of 3/20/2018 14:26   Ref. Range 2/7/2018 13:29   Copper Latest Ref Range: 665 - 1480 ug/L 1178         Lab Results   Component Value Date    WBC 9.06 08/20/2018    HGB 10.6 (L) 08/20/2018    HCT 33.2 (L) 08/20/2018    MCV 79 (L) 08/20/2018    PLT 72 (L) 08/20/2018       Lab Results   Component Value Date    WBC 9.06 08/20/2018    HGB 10.6 (L) 08/20/2018    HCT 33.2 (L) 08/20/2018    MCV 79 (L) 08/20/2018    PLT 72 (L) 08/20/2018       Lab Results   Component Value Date    IRON 45 08/20/2018    TIBC 169 (L) 08/20/2018    FERRITIN 1,281 (H) 08/20/2018           Pathology 3/8/2018   FINAL PATHOLOGIC DIAGNOSIS  BONE MARROW ASPIRATE SMEARS, TOUCH IMPRINTS, CORE BIOPSY, LEFT ILIAC CREST, AND CLOT  SECTION:  --Hypercellular bone marrow with megakaryocyte atypia and ring sideroblasts, see comment.  --Mildly increased reticulin fibrosis (MF-1).  --Mild polytypic plasmacytosis, 5.6%, see comment.  COMMENT: The core biopsy is hypercellular for age (50%) and features panhyperplasia. The megakaryocytes are  predominantly small and hypolobated. Blasts are not increased by morphology or in the corresponding flow  cytometric analysis (please see separate report). There is mild erythroid atypia and approximately 7.5% of the  erythroid precursors are ring sideroblasts. Taken together, these findings are concerning for a myelodysplastic  syndrome; however, all other non-neoplastic causes for these features must be excluded, and correlation with the  corresponding cytogenetics analysis is required. Given the presence of ring sideroblasts, NGS including SF3B1  mutational analysis is being performed [facility] [employer], and these results will be reported in a  supplemental report. Additionally, there is a mild polytypic plasmacytosis, which may be seen in association with  bacterial and viral infections, autoimmune conditions, cirrhosis, and  "in association with neoplastic disorders.    Supplemental Diagnosis  Please also see cytogenetic karyotype results reported in Epic from [facility], [address] [facility], [place] [address], which give, in part, the following results:  "The result is abnormal. Of 20 metaphases, 13 metaphases were normal and 7 metaphases had an unbalanced 1;7  translocation resulting in a 1q duplication and a 7q deletion. This translocation has been associated with both de  bill and therapy-related MDS and AML (Do M et al., Leukemia 21(5);992-7, 2007)."  These findings support a neoplastic etiology for the morphologic features seen, and this case is best classified as a  myelodysplastic syndrome with single lineage dysplasia pending NGS for SF3B1 mutational analysis.      CT chest w/out contrast 2/25/2018   1.  Spiculated 2.0 x 0.8 cm nodule in the left lung apex on a background of diffuse paraseptal emphysema.  The findings are concerning for primary pulmonary malignancy.  PET/CT scan or tissue sampling is suggested for further evaluation.    2.  Airspace opacities in the bilateral lower lobes.    3. Extensive coronary artery calcifications.  This may be a marker of coronary artery disease          CT head 5/16/2018 -No acute intracranial abnormality.    Results for FUNMI PALACIOS TYREE (MRN [id number]) as of 7/12/2018 08:22   Ref. Range 6/4/2018 09:31   Erythropoietin Latest Ref Range: 2.6 - 18.5 mIU/mL 90.1 (H)     Assessment:       1. MDS (myelodysplastic syndrome)    2. Anemia in neoplastic disease    3. Adenocarcinoma, lung, left        Plan:   1-2  67-year-old male with significant comorbidities with  diagnosed MDS 3/18/2018  He has dysplastic changes in his marrow, with ringed sideroblasts comprising 7.5% of the RBC precursors. NGS panel pending. Cytogenetics revealed unbalanced 1;7 translocation resulting in a 1q duplication and a 7q deletion in 7 of the 20 metaphases.Blasts were not " increased.  IPSS-R INT  Score is 4.5, putting him in the intermediate risk category. Median time to 25% AML evolution is~3.2 years.I discussed treatment of intermediate risk MDS.   Pt followed by Dr. Orellana  It has been determined that conservative/low intensity treatments with EPO analogues, GCSF, transfusions can be tried to assess response.  Hypomethylating agent is an option as well.  NGS panel showed ASXL1, CBL and SEPBP1 mutations. There are no direct therapeutic implications for these mutations at this time.  There are no clinical trials available here at this time for MDS without excess blasts.  HIV NEG  Hep panel NEG    .Hb  10.6 g/dl  Cont conservative therapy   Plan to cont weekly  EPO inj ( pending lab parameters)   EPO level < 500      3 . Adenocarcinoma lung: pT2Nx. S/p wedge resection. He could get EBUS. There was an indeterminate prevascular lymph node on PET CT. Surveillance is needed.  . CT head 5/16/2018 -No acute intracranial abnormality.  Follow-up with PULMONOLOGY 9/5/2018 with CT imaging planned prior to f/u   Consider follow-up PET /CT ( pending CT imaging findings)           Cc MD Clint Kimbrough MD

## 2018-08-24 DIAGNOSIS — C34.92 ADENOCARCINOMA, LUNG, LEFT: ICD-10-CM

## 2018-08-24 DIAGNOSIS — D46.9 MDS (MYELODYSPLASTIC SYNDROME): ICD-10-CM

## 2018-08-24 DIAGNOSIS — M25.562 LEFT KNEE PAIN, UNSPECIFIED CHRONICITY: ICD-10-CM

## 2018-08-24 RX ORDER — OXYCODONE AND ACETAMINOPHEN 5; 325 MG/1; MG/1
1 TABLET ORAL EVERY 4 HOURS PRN
Qty: 30 TABLET | Refills: 0 | Status: SHIPPED | OUTPATIENT
Start: 2018-08-24 | End: 2018-09-04 | Stop reason: SDUPTHER

## 2018-08-24 NOTE — TELEPHONE ENCOUNTER
----- Message from Lubna Fontanez sent at 8/24/2018  8:41 AM CDT -----  Contact: Self   Patient says he need to speak with the doctor to update him on his recent surgery. Please call at [phone]

## 2018-08-24 NOTE — TELEPHONE ENCOUNTER
Pt. States he had his Hydrocele surgery this past Friday.    Pt. Also needs refill on his Oxycodone.  6/4/18 LOV

## 2018-08-29 ENCOUNTER — OFFICE VISIT (OUTPATIENT)
Dept: UROLOGY | Facility: CLINIC | Age: 68
End: 2018-08-29
Payer: MEDICARE

## 2018-08-29 VITALS
BODY MASS INDEX: 17.53 KG/M2 | DIASTOLIC BLOOD PRESSURE: 72 MMHG | WEIGHT: 118.38 LBS | HEART RATE: 62 BPM | SYSTOLIC BLOOD PRESSURE: 118 MMHG | HEIGHT: 69 IN

## 2018-08-29 DIAGNOSIS — N43.3 LEFT HYDROCELE: Primary | ICD-10-CM

## 2018-08-29 PROCEDURE — 99999 PR PBB SHADOW E&M-EST. PATIENT-LVL III: CPT | Mod: PBBFAC,,, | Performed by: UROLOGY

## 2018-08-29 PROCEDURE — 99024 POSTOP FOLLOW-UP VISIT: CPT | Mod: S$GLB,,, | Performed by: UROLOGY

## 2018-08-29 NOTE — PROGRESS NOTES
Subjective:       Patient ID: Glen Arroyo is a 68 y.o. male who was last seen in this office 8/1/2018    Chief Complaint:   Chief Complaint   Patient presents with    Post-op Evaluation     post op from hydrocelectomy       Post-Operative Follow-up  Patient here for post-op follow-up. Patient is 2 weeks status post left hydrocelectomy.   The patient reports no problems with eating, bowel movements, voiding, or their wound. The patient is not having any pain.  He had some bleeding afterwards that is improving.      ACTIVE MEDICAL ISSUES:  Patient Active Problem List   Diagnosis    AAA (abdominal aortic aneurysm) without rupture    Tobacco dependence in remission    Essential hypertension    Symptomatic anemia    Pancytopenia    Folate deficiency anemia    Weight loss, non-intentional    MDS (myelodysplastic syndrome)    Pulmonary emphysema    Aortic atherosclerosis    Pulmonary nodule, left    Left knee pain    Lung nodule    Adenocarcinoma, lung, left    Primary insomnia    Left hydrocele       ALLERGIES AND MEDICATIONS: updated and reviewed.  Review of patient's allergies indicates:  No Known Allergies  Current Outpatient Medications   Medication Sig    cyanocobalamin (VITAMIN B-12) 100 MCG tablet Take 100 mcg by mouth once daily.    epoetin manuel (PROCRIT INJ) Inject as directed once a week.    folic acid (FOLVITE) 800 MCG Tab Take 800 mcg by mouth once daily.    oxyCODONE-acetaminophen (PERCOCET) 5-325 mg per tablet Take 1 tablet by mouth every 4 (four) hours as needed for Pain (Pain related to surgery and cancer diagnosis).    zinc gluconate 50 mg tablet Take 50 mg by mouth once daily.     No current facility-administered medications for this visit.        Review of Systems   Constitutional: Negative for activity change, fatigue, fever and unexpected weight change.   HENT: Negative for congestion.    Eyes: Negative for redness.   Respiratory: Negative for chest tightness and shortness  "of breath.    Cardiovascular: Negative for chest pain and leg swelling.   Gastrointestinal: Negative for abdominal pain, constipation, diarrhea, nausea and vomiting.   Genitourinary: Negative for dysuria, flank pain, frequency, hematuria, penile pain, penile swelling, scrotal swelling, testicular pain and urgency.   Musculoskeletal: Negative for arthralgias and back pain.   Neurological: Negative for dizziness and light-headedness.   Psychiatric/Behavioral: Negative for behavioral problems and confusion. The patient is not nervous/anxious.    All other systems reviewed and are negative.      Objective:      Vitals:    08/29/18 1337   BP: 118/72   Pulse: 62   Weight: 53.7 kg (118 lb 6.2 oz)   Height: 5' 9" (1.753 m)     Physical Exam   Nursing note and vitals reviewed.  Constitutional: He is oriented to person, place, and time. He appears well-developed and well-nourished.   HENT:   Head: Normocephalic.   Eyes: Conjunctivae are normal.   Neck: Normal range of motion. Neck supple. No tracheal deviation present. No thyromegaly present.   Cardiovascular: Normal rate and normal heart sounds.    Pulmonary/Chest: Effort normal and breath sounds normal. No respiratory distress. He has no wheezes.   Abdominal: Soft. Bowel sounds are normal. There is no hepatosplenomegaly. There is no tenderness. There is no rebound and no CVA tenderness. No hernia.   Genitourinary: Penis normal. Right testis shows no mass and no tenderness. Left testis shows swelling. Left testis shows no mass and no tenderness. Circumcised.   Genitourinary Comments: Incision clean, sutures still in place.  No erythema.  Expected left testicular enlargement   Musculoskeletal: Normal range of motion. He exhibits no edema or tenderness.   Lymphadenopathy:     He has no cervical adenopathy.   Neurological: He is alert and oriented to person, place, and time.   Skin: Skin is warm and dry. No rash noted. No erythema.     Psychiatric: He has a normal mood and " affect. His behavior is normal. Judgment and thought content normal.       Urine dipstick shows negative for all components.  Micro exam: negative for WBC's or RBC's.    Assessment:       1. Left hydrocele          Plan:       1. Left hydrocele  Okay to shower  Neosporin prn to wound  Doing well            Follow-up in about 6 weeks (around 10/10/2018).

## 2018-09-03 ENCOUNTER — LAB VISIT (OUTPATIENT)
Dept: LAB | Facility: HOSPITAL | Age: 68
End: 2018-09-03
Attending: INTERNAL MEDICINE
Payer: MEDICARE

## 2018-09-03 DIAGNOSIS — D46.9 MYELODYSPLASTIC SYNDROME: ICD-10-CM

## 2018-09-03 LAB
BASOPHILS # BLD AUTO: 0.02 K/UL
BASOPHILS NFR BLD: 0.2 %
DIFFERENTIAL METHOD: ABNORMAL
EOSINOPHIL # BLD AUTO: 0.2 K/UL
EOSINOPHIL NFR BLD: 1.8 %
ERYTHROCYTE [DISTWIDTH] IN BLOOD BY AUTOMATED COUNT: 19.1 %
HCT VFR BLD AUTO: 34.6 %
HGB BLD-MCNC: 11.1 G/DL
LYMPHOCYTES # BLD AUTO: 3.9 K/UL
LYMPHOCYTES NFR BLD: 37.2 %
MCH RBC QN AUTO: 25.1 PG
MCHC RBC AUTO-ENTMCNC: 32.1 G/DL
MCV RBC AUTO: 78 FL
MONOCYTES # BLD AUTO: 3.1 K/UL
MONOCYTES NFR BLD: 29.9 %
NEUTROPHILS # BLD AUTO: 3.3 K/UL
NEUTROPHILS NFR BLD: 31.6 %
PLATELET # BLD AUTO: 76 K/UL
PLATELET BLD QL SMEAR: ABNORMAL
PMV BLD AUTO: ABNORMAL FL
RBC # BLD AUTO: 4.43 M/UL
WBC # BLD AUTO: 10.51 K/UL

## 2018-09-03 PROCEDURE — 36415 COLL VENOUS BLD VENIPUNCTURE: CPT

## 2018-09-03 PROCEDURE — 85025 COMPLETE CBC W/AUTO DIFF WBC: CPT

## 2018-09-04 ENCOUNTER — TELEPHONE (OUTPATIENT)
Dept: CARDIOTHORACIC SURGERY | Facility: CLINIC | Age: 68
End: 2018-09-04

## 2018-09-04 DIAGNOSIS — D46.9 MDS (MYELODYSPLASTIC SYNDROME): ICD-10-CM

## 2018-09-04 DIAGNOSIS — C34.92 ADENOCARCINOMA, LUNG, LEFT: ICD-10-CM

## 2018-09-04 DIAGNOSIS — M25.562 LEFT KNEE PAIN, UNSPECIFIED CHRONICITY: ICD-10-CM

## 2018-09-04 RX ORDER — OXYCODONE AND ACETAMINOPHEN 5; 325 MG/1; MG/1
1 TABLET ORAL EVERY 4 HOURS PRN
Qty: 30 TABLET | Refills: 0 | Status: SHIPPED | OUTPATIENT
Start: 2018-09-04 | End: 2018-09-13

## 2018-09-04 NOTE — TELEPHONE ENCOUNTER
----- Message from Torrie Hwang sent at 9/4/2018  8:55 AM CDT -----  Contact: [phone]  Pt is requesting a refill on oxycodone, Pls call...     [facility] [id number]  [place] - [address] AT [facility] [address]  [address]  [place] [address]  Phone: [phone] Fax: [phone]    Thanks.....Trinh

## 2018-09-04 NOTE — TELEPHONE ENCOUNTER
----- Message from Mark Souza sent at 9/4/2018  8:33 AM CDT -----  Pt said he cancelled his appt and CT for tomorrow due to the weather. Pt said he would like to reschedule it as soon as possible. Please call regarding this at [phone]      Patient rescheduled.  Appointment information given verbally and reminder mailed.

## 2018-09-10 ENCOUNTER — OFFICE VISIT (OUTPATIENT)
Dept: FAMILY MEDICINE | Facility: CLINIC | Age: 68
End: 2018-09-10
Payer: MEDICARE

## 2018-09-10 VITALS
WEIGHT: 113.75 LBS | RESPIRATION RATE: 12 BRPM | DIASTOLIC BLOOD PRESSURE: 62 MMHG | HEART RATE: 80 BPM | HEIGHT: 69 IN | SYSTOLIC BLOOD PRESSURE: 100 MMHG | TEMPERATURE: 98 F | BODY MASS INDEX: 16.85 KG/M2 | OXYGEN SATURATION: 97 %

## 2018-09-10 DIAGNOSIS — D46.9 MDS (MYELODYSPLASTIC SYNDROME): Primary | ICD-10-CM

## 2018-09-10 DIAGNOSIS — R63.4 WEIGHT LOSS, NON-INTENTIONAL: ICD-10-CM

## 2018-09-10 DIAGNOSIS — F51.01 PRIMARY INSOMNIA: ICD-10-CM

## 2018-09-10 DIAGNOSIS — Z23 IMMUNIZATION DUE: ICD-10-CM

## 2018-09-10 PROCEDURE — 99214 OFFICE O/P EST MOD 30 MIN: CPT | Mod: PBBFAC,PN | Performed by: FAMILY MEDICINE

## 2018-09-10 PROCEDURE — 90662 IIV NO PRSV INCREASED AG IM: CPT | Mod: PBBFAC,PN

## 2018-09-10 PROCEDURE — 1101F PT FALLS ASSESS-DOCD LE1/YR: CPT | Mod: CPTII,,, | Performed by: FAMILY MEDICINE

## 2018-09-10 PROCEDURE — 99214 OFFICE O/P EST MOD 30 MIN: CPT | Mod: S$PBB,,, | Performed by: FAMILY MEDICINE

## 2018-09-10 PROCEDURE — 3078F DIAST BP <80 MM HG: CPT | Mod: CPTII,,, | Performed by: FAMILY MEDICINE

## 2018-09-10 PROCEDURE — 99999 PR PBB SHADOW E&M-EST. PATIENT-LVL IV: CPT | Mod: PBBFAC,,, | Performed by: FAMILY MEDICINE

## 2018-09-10 PROCEDURE — 3074F SYST BP LT 130 MM HG: CPT | Mod: CPTII,,, | Performed by: FAMILY MEDICINE

## 2018-09-10 RX ORDER — TEMAZEPAM 15 MG/1
15 CAPSULE ORAL NIGHTLY PRN
Qty: 30 CAPSULE | Refills: 0 | Status: SHIPPED | OUTPATIENT
Start: 2018-09-10 | End: 2018-09-24

## 2018-09-10 NOTE — PROGRESS NOTES
Routine Office Visit    Patient Name: Glen Arroyo    : 1950  MRN: [id number]    Subjective:  Glen is a 68 y.o. male who presents today for:    1. Insomnia  Patient presenting today with primary insomnia for several months.  He has tried mutliple medications including Mirtazapine, but no relief.  He sleeps a max of 4 hours a night.  He states that he normally wakes up at 2AM and then has trouble going back to sleep.  He has been diagnosed with myelodysplastic syndrome and is currently followed by hem/onc.  He has also been continuing to lose weight despite eating more and drinking supplements (Boost).  There have been no other symptoms.  He is hoarse today, but states that is from having to yell because his wife won't wear her hearing aids at home.    Past Medical History  Past Medical History:   Diagnosis Date    AAA (abdominal aortic aneurysm) 2016    Cancer     LUNG     LEFT    Diverticulitis     HTN (hypertension)     NO LONGER TAKING MEDS    MDS (myelodysplastic syndrome)        Past Surgical History  Past Surgical History:   Procedure Laterality Date    AANGIOPLASTY      STENT    ABDOMINAL AORTIC ANEURYSM REPAIR      APPENDECTOMY      BIOPSY-BONE MARROW Left 3/8/2018    Performed by Duke Tyler [facility] at [facility] OR [address] [place] [address]    CARDIAC SURGERY      COLOSTOMY      PLACEMENT AND REMOVAL- Diveticulitis related     COLOSTOMY      diverticulitis.  removed 2 foot of colon.     EXCISION OF HYDROCELE Left 2018    Procedure: HYDROCELECTOMY;  Surgeon: TIFFANIE Grant MD;  Location: [facility] OR;  Service: Urology;  Laterality: Left;  PRE-OP BY RN 2018    HYDROCELECTOMY Left 2018    Performed by TIFFANIE Grant MD at [facility] OR    LUNG CANCER SURGERY Left     WEDGE RESECTION    REPAIR ABDOMINAL-AORTIC ANEURYSM-ENDOVASCULAR GRAFT (AAA) with PTA of bilateral iliac and stents of bilateral iliacs N/A 10/20/2016    Performed by Topher Levine MD at [facility] OR [address] [place] [address]     THORACOSCOPY-VIDEO ASSISTED (VATS) W/WEDGE LUNG RESECTION Left 2018    [facility]    TONSILLECTOMY         Family History  Family History   Problem Relation Age of Onset    Cancer Mother     Cancer Father     Anesthesia problems Neg Hx        Social History  Social History     Socioeconomic History    Marital status: Single     Spouse name: Not on file    Number of children: Not on file    Years of education: Not on file    Highest education level: Not on file   Social Needs    Financial resource strain: Not on file    Food insecurity - worry: Not on file    Food insecurity - inability: Not on file    Transportation needs - medical: Not on file    Transportation needs - non-medical: Not on file   Occupational History    Not on file   Tobacco Use    Smoking status: Former Smoker     Packs/day: 1.50     Years: 30.00     Pack years: 45.00     Types: Cigarettes     Last attempt to quit: 2017     Years since quittin.7    Smokeless tobacco: Never Used    Tobacco comment: quit 2 months ago     Substance and Sexual Activity    Alcohol use: No     Frequency: Never    Drug use: No    Sexual activity: Not Currently   Other Topics Concern    Not on file   Social History Narrative    Not on file       Current Medications  Current Outpatient Medications on File Prior to Visit   Medication Sig Dispense Refill    cyanocobalamin (VITAMIN B-12) 100 MCG tablet Take 100 mcg by mouth once daily.      folic acid (FOLVITE) 800 MCG Tab Take 800 mcg by mouth once daily.      oxyCODONE-acetaminophen (PERCOCET) 5-325 mg per tablet Take 1 tablet by mouth every 4 (four) hours as needed for Pain (Pain related to surgery and cancer diagnosis). 30 tablet 0    zinc gluconate 50 mg tablet Take 50 mg by mouth once daily.      epoetin manuel (PROCRIT INJ) Inject as directed once a week.       No current facility-administered medications on file prior to visit.   "      Allergies   Review of patient's allergies indicates:  No Known Allergies    Review of Systems (Pertinent positives)  Review of Systems   Constitutional: Positive for weight loss.   HENT: Negative.    Eyes: Negative.    Respiratory: Negative.    Cardiovascular: Negative.    Gastrointestinal: Negative.    Genitourinary: Negative.    Musculoskeletal: Positive for back pain and myalgias.   Skin: Negative.    Psychiatric/Behavioral: The patient has insomnia.          /62 (BP Location: Right arm, Patient Position: Sitting, BP Method: Medium (Manual))   Pulse 80   Temp 97.6 °F (36.4 °C) (Oral)   Resp 12   Ht 5' 9" (1.753 m)   Wt 51.6 kg (113 lb 12.1 oz)   SpO2 97%   BMI 16.80 kg/m²     GENERAL APPEARANCE: in no apparent distress and well developed and well nourished  HEENT: PERRL, EOMI, Sclera clear, anicteric, Oropharynx clear, no lesions, Neck supple with midline trachea  NECK: normal, supple, no adenopathy, thyroid normal in size  RESPIRATORY: appears well, vitals normal, no respiratory distress, acyanotic, normal RR, chest clear, no wheezing, crepitations, rhonchi, normal symmetric air entry  HEART: regular rate and rhythm, S1, S2 normal, no murmur, click, rub or gallop.    ABDOMEN: abdomen is soft without tenderness, no masses, no hernias, no organomegaly, no rebound, no guarding. Suprapubic tenderness absent. No CVA tenderness.  SKIN: no rashes, no wounds, no other lesions  PSYCH: Alert, oriented x 3, thought content appropriate, speech normal, pleasant and cooperative, good eye contact, well groomed    Assessment/Plan:  Glen Arroyo is a 68 y.o. male who presents today for :    Glen was seen today for follow-up.    Diagnoses and all orders for this visit:    MDS (myelodysplastic syndrome)    Weight loss, non-intentional    Immunization due  -     Influenza - High Dose (65+) (PF) (IM)    Primary insomnia  -     temazepam (RESTORIL) 15 mg Cap; Take 1 capsule (15 mg total) by mouth nightly as " needed.      1.  MDS followed by Hem/onc  2.  Follow up with specialists as scheduled  3.  Flu shot given  4.  Start on restoril and follow up in 2 weeks or sooner if needed    Sheldon Bailey MD

## 2018-09-13 ENCOUNTER — TELEPHONE (OUTPATIENT)
Dept: FAMILY MEDICINE | Facility: CLINIC | Age: 68
End: 2018-09-13

## 2018-09-13 DIAGNOSIS — M25.562 LEFT KNEE PAIN, UNSPECIFIED CHRONICITY: ICD-10-CM

## 2018-09-13 DIAGNOSIS — D46.9 MDS (MYELODYSPLASTIC SYNDROME): ICD-10-CM

## 2018-09-13 DIAGNOSIS — C34.92 ADENOCARCINOMA, LUNG, LEFT: ICD-10-CM

## 2018-09-13 RX ORDER — OXYCODONE AND ACETAMINOPHEN 5; 325 MG/1; MG/1
1 TABLET ORAL EVERY 6 HOURS PRN
Qty: 120 TABLET | Refills: 0 | Status: ON HOLD | OUTPATIENT
Start: 2018-09-13 | End: 2018-10-05 | Stop reason: HOSPADM

## 2018-09-13 NOTE — TELEPHONE ENCOUNTER
----- Message from Sheldon Bailey MD sent at 9/12/2018  2:33 PM CDT -----  Contact: self  Please check to see how many tablets he has left    Thanks,  Dr. Bailey     ----- Message -----  From: Keyona Nails MA  Sent: 9/12/2018   1:56 PM  To: Sheldon Bailey MD        ----- Message -----  From: Shilpa Delacruz  Sent: 9/12/2018  10:29 AM  To: Lynn Chung Staff    Pt refill request. He takes them 6 times a day, so the bottle doesn't last long. Patient requesting to speak to nurse. Please call at [phone]    oxyCODONE-acetaminophen (PERCOCET) 5-325 mg per tablet    [facility] [address] [place] [address] - [address] AT [facility] [address] [phone] (Phone)  [phone] (Fax

## 2018-09-13 NOTE — TELEPHONE ENCOUNTER
Pt states that when Percocet was ordered on 9/4/18, it was only a 5 day supply (30 Tabs). He's taking it every 6 hours. He is out of the med now.

## 2018-09-13 NOTE — TELEPHONE ENCOUNTER
----- Message from Mike Castellano sent at 9/12/2018  3:51 PM CDT -----  Contact: Self/[phone]  The patient is requesting to speak to someone in regards to  oxyCODONE-acetaminophen (PERCOCET) 5-325 mg per tabl     Thank you

## 2018-09-13 NOTE — TELEPHONE ENCOUNTER
Prescription refilled for 30 days worth.  Remind him to only take as needed.     Thanks,  Dr. Bailey

## 2018-09-13 NOTE — H&P (VIEW-ONLY)
Subjective:       Patient ID: Glen Arroyo is a 68 y.o. male.    Chief Complaint: No chief complaint on file.    Diagnosis:  MDS, NSCLC - adenocarcinoma    Pre-operative therapy: none    Procedure(s) and date(s): 1. 4/17/18 - Left VATS DEIRDRE wedge resection    Pathology: 1. 1.4cm moderately differentiated invasive adenocarcinoma, VPI present, LVI present, 3mm parenchyml margin, pT2NX     Post-operative therapy: treatment for MDS, surveillance of NSCLC    HPI   68 y.o. male former smoker here today for 5 month follow - up s/p left VATS DEIRDRE wedge resection for pT2a invasive adenocarcinoma. Prior to surgery he was also diagnosed with MDS and needed urgent treatment. He currently follows with BMT is deemed intermediate risk and is scheduled for weekly Procrit injections. Recent had left hydrocelectomy. He returns today with 5 month f/u CT. We have also been following a prevascular LN.      Review of Systems   Constitutional: Positive for unexpected weight change.   Respiratory: Negative for cough, shortness of breath and wheezing.    Gastrointestinal: Positive for constipation.         Objective:       There were no vitals filed for this visit.    Physical Exam   Constitutional: He is oriented to person, place, and time.   Thin   HENT:   Head: Normocephalic.   Right Ear: External ear normal.   Left Ear: External ear normal.   Mouth/Throat: Oropharynx is clear and moist.   Eyes: Conjunctivae and EOM are normal. Pupils are equal, round, and reactive to light. No scleral icterus.   Neck: Normal range of motion. No JVD present. No tracheal deviation present. No thyromegaly present.   Pulmonary/Chest: Effort normal and breath sounds normal.   Abdominal: Soft. He exhibits no distension and no mass. There is no tenderness.   Musculoskeletal: Normal range of motion.   Lymphadenopathy:     He has no cervical adenopathy.   Neurological: He is alert and oriented to person, place, and time.   Skin: Skin is warm and dry. Capillary  refill takes less than 2 seconds.   Psychiatric: He has a normal mood and affect.         Pathology:  1.4cm moderately differentiated invasive adenocarcinoma, VPI present, LVI present, 3mm parenchyml margin, pT2NX     Chest CT 9/14/18: images reviewed. Report pending.  There appears to be enlargement of an APW LN.  I do not see any new pulmonary nodules.    Assessment:       66 y/o male former smoker with AAA s/p PTA and stenting of bilateral iliacs, diverticulitis, HTN, tobacco abuse, MDS currently receiving EPO, and pT2aNx DEIRDRE invasive adenocarcinoma with previously identified pre vascular LN here today for 5 month f/u.        Plan:       Will f/u official read.   RTC in     T2aNx DEIRDRE adenoCA, h/o L VATS SLR:  Limited surgical approach secondary to active myelodysplastic syndrome and the need to institute treatment.  There is  enlargement of an AP window LN.  I recommend PET scan and repeat VATS, APW LN biopsy to r/o N2 disease.  Some consideration should also be given to upper and lower endoscopy given weight loss and bowel changes, especially if APW LN biopsy proves negative.    RTC after PET scan.

## 2018-09-14 ENCOUNTER — HOSPITAL ENCOUNTER (OUTPATIENT)
Dept: RADIOLOGY | Facility: HOSPITAL | Age: 68
Discharge: HOME | End: 2018-09-14
Attending: THORACIC SURGERY (CARDIOTHORACIC VASCULAR SURGERY)
Payer: MEDICARE

## 2018-09-14 ENCOUNTER — OFFICE VISIT (OUTPATIENT)
Dept: CARDIOTHORACIC SURGERY | Facility: CLINIC | Age: 68
End: 2018-09-14
Attending: THORACIC SURGERY (CARDIOTHORACIC VASCULAR SURGERY)
Payer: MEDICARE

## 2018-09-14 VITALS
HEIGHT: 69 IN | SYSTOLIC BLOOD PRESSURE: 108 MMHG | HEART RATE: 86 BPM | DIASTOLIC BLOOD PRESSURE: 68 MMHG | WEIGHT: 115.31 LBS | BODY MASS INDEX: 17.08 KG/M2 | OXYGEN SATURATION: 98 %

## 2018-09-14 DIAGNOSIS — C34.12 MALIGNANT NEOPLASM OF UPPER LOBE OF LEFT LUNG: ICD-10-CM

## 2018-09-14 DIAGNOSIS — R91.1 PULMONARY NODULE, LEFT: Primary | ICD-10-CM

## 2018-09-14 DIAGNOSIS — R59.0 MEDIASTINAL LYMPHADENOPATHY: ICD-10-CM

## 2018-09-14 DIAGNOSIS — R91.1 PULMONARY NODULE: ICD-10-CM

## 2018-09-14 PROCEDURE — 99999 PR PBB SHADOW E&M-EST. PATIENT-LVL III: CPT | Mod: PBBFAC,,, | Performed by: THORACIC SURGERY (CARDIOTHORACIC VASCULAR SURGERY)

## 2018-09-14 PROCEDURE — 3074F SYST BP LT 130 MM HG: CPT | Mod: CPTII,,, | Performed by: THORACIC SURGERY (CARDIOTHORACIC VASCULAR SURGERY)

## 2018-09-14 PROCEDURE — 1101F PT FALLS ASSESS-DOCD LE1/YR: CPT | Mod: CPTII,,, | Performed by: THORACIC SURGERY (CARDIOTHORACIC VASCULAR SURGERY)

## 2018-09-14 PROCEDURE — 99213 OFFICE O/P EST LOW 20 MIN: CPT | Mod: PBBFAC,25 | Performed by: THORACIC SURGERY (CARDIOTHORACIC VASCULAR SURGERY)

## 2018-09-14 PROCEDURE — 3078F DIAST BP <80 MM HG: CPT | Mod: CPTII,,, | Performed by: THORACIC SURGERY (CARDIOTHORACIC VASCULAR SURGERY)

## 2018-09-14 PROCEDURE — 71250 CT THORAX DX C-: CPT | Mod: TC

## 2018-09-14 PROCEDURE — 71250 CT THORAX DX C-: CPT | Mod: 26,,, | Performed by: RADIOLOGY

## 2018-09-14 PROCEDURE — 99213 OFFICE O/P EST LOW 20 MIN: CPT | Mod: S$PBB,,, | Performed by: THORACIC SURGERY (CARDIOTHORACIC VASCULAR SURGERY)

## 2018-09-19 ENCOUNTER — INFUSION (OUTPATIENT)
Dept: INFUSION THERAPY | Facility: HOSPITAL | Age: 68
End: 2018-09-19
Attending: INTERNAL MEDICINE
Payer: MEDICARE

## 2018-09-19 VITALS
TEMPERATURE: 98 F | OXYGEN SATURATION: 98 % | SYSTOLIC BLOOD PRESSURE: 124 MMHG | DIASTOLIC BLOOD PRESSURE: 60 MMHG | RESPIRATION RATE: 16 BRPM | HEART RATE: 73 BPM

## 2018-09-19 DIAGNOSIS — D46.9 MDS (MYELODYSPLASTIC SYNDROME): Primary | ICD-10-CM

## 2018-09-19 PROCEDURE — 63600175 PHARM REV CODE 636 W HCPCS: Mod: JG | Performed by: INTERNAL MEDICINE

## 2018-09-19 PROCEDURE — 96372 THER/PROPH/DIAG INJ SC/IM: CPT

## 2018-09-19 RX ADMIN — ERYTHROPOIETIN 40000 UNITS: 40000 INJECTION, SOLUTION INTRAVENOUS; SUBCUTANEOUS at 08:09

## 2018-09-19 NOTE — PLAN OF CARE
"Problem: Patient Care Overview  Goal: Plan of Care Review  Outcome: Ongoing (interventions implemented as appropriate)  Pt presented for procrit injection. Hgb 10.1. VSS. Afebrile. Pt reports "they found a spot. I'm supposed to get a PET scan tomorrow, but it hasn't been approved yet. They want to take a sample of the spot next week." Procrit to R arm. Pt tolerated well. AVS given to pt with future appt details. Pt reports lots of nausea, but no vomiting. Pt reports a good appetite, despite the nausea. Pt reports not being able to sleep at night, and that he has an appt with his PCP on Monday to discuss increasing the dose of his sleeping medicine. He said that he doesn't have much energy and feels weak/tired most of the time, because of the lack of sleep. Pt reports spending a lot of time in his chair during the day to try and get some rest. Distress score of 1 d/t lack of sleep, nausea, and recent discovery of "spot."  Printed AVS with appt details.      "

## 2018-09-20 ENCOUNTER — NURSE TRIAGE (OUTPATIENT)
Dept: ADMINISTRATIVE | Facility: CLINIC | Age: 68
End: 2018-09-20

## 2018-09-20 ENCOUNTER — HOSPITAL ENCOUNTER (OUTPATIENT)
Dept: RADIOLOGY | Facility: HOSPITAL | Age: 68
Discharge: HOME | End: 2018-09-20
Attending: THORACIC SURGERY (CARDIOTHORACIC VASCULAR SURGERY)
Payer: MEDICARE

## 2018-09-20 DIAGNOSIS — R91.1 PULMONARY NODULE, LEFT: ICD-10-CM

## 2018-09-20 PROCEDURE — 78815 PET IMAGE W/CT SKULL-THIGH: CPT | Mod: 26,PS,, | Performed by: RADIOLOGY

## 2018-09-20 PROCEDURE — 78815 PET IMAGE W/CT SKULL-THIGH: CPT | Mod: TC

## 2018-09-20 PROCEDURE — A9552 F18 FDG: HCPCS

## 2018-09-21 ENCOUNTER — TELEPHONE (OUTPATIENT)
Dept: FAMILY MEDICINE | Facility: CLINIC | Age: 68
End: 2018-09-21

## 2018-09-21 NOTE — TELEPHONE ENCOUNTER
Patient informed of Dr. Bailey's recommendation to NOT take 2 sleeping pills at 1 time, and to take as directed. Patient verbalized understanding.

## 2018-09-21 NOTE — TELEPHONE ENCOUNTER
----- Message from Sheldon Bailey MD sent at 9/21/2018  7:48 AM CDT -----  Contact: Self/ [phone]  I would recommend against this as combining that high a dose of that medication and his pain medication could compromise his breathing.    Dr. Bailey     ----- Message -----  From: aNthalia Rao LPN  Sent: 9/20/2018   4:16 PM  To: Sheldon Bailey MD     Patient requesting to take 2 sleeping pills at bedtime. Please advise.   ----- Message -----  From: Yue Larson  Sent: 9/20/2018   4:12 PM  To: Lynn Chung Staff    Pt calling in regards to sleeping medications. Wants to know if he can take 2 pills instead of 1. Please call to advise. Thank you.

## 2018-09-24 ENCOUNTER — OFFICE VISIT (OUTPATIENT)
Dept: FAMILY MEDICINE | Facility: CLINIC | Age: 68
End: 2018-09-24
Payer: MEDICARE

## 2018-09-24 VITALS
HEIGHT: 69 IN | DIASTOLIC BLOOD PRESSURE: 70 MMHG | SYSTOLIC BLOOD PRESSURE: 124 MMHG | TEMPERATURE: 98 F | BODY MASS INDEX: 17.37 KG/M2 | WEIGHT: 117.31 LBS | HEART RATE: 76 BPM | OXYGEN SATURATION: 98 % | RESPIRATION RATE: 12 BRPM

## 2018-09-24 DIAGNOSIS — F13.20 BENZODIAZEPINE DEPENDENCE, CONTINUOUS: ICD-10-CM

## 2018-09-24 DIAGNOSIS — Z23 IMMUNIZATION DUE: ICD-10-CM

## 2018-09-24 DIAGNOSIS — F51.01 PRIMARY INSOMNIA: Primary | ICD-10-CM

## 2018-09-24 PROCEDURE — 99999 PR PBB SHADOW E&M-EST. PATIENT-LVL III: CPT | Mod: PBBFAC,,, | Performed by: FAMILY MEDICINE

## 2018-09-24 PROCEDURE — 3074F SYST BP LT 130 MM HG: CPT | Mod: CPTII,,, | Performed by: FAMILY MEDICINE

## 2018-09-24 PROCEDURE — G0009 ADMIN PNEUMOCOCCAL VACCINE: HCPCS | Mod: PBBFAC,PN

## 2018-09-24 PROCEDURE — 99213 OFFICE O/P EST LOW 20 MIN: CPT | Mod: PBBFAC,PN,25 | Performed by: FAMILY MEDICINE

## 2018-09-24 PROCEDURE — 1101F PT FALLS ASSESS-DOCD LE1/YR: CPT | Mod: CPTII,,, | Performed by: FAMILY MEDICINE

## 2018-09-24 PROCEDURE — 99215 OFFICE O/P EST HI 40 MIN: CPT | Mod: S$PBB,,, | Performed by: FAMILY MEDICINE

## 2018-09-24 PROCEDURE — 99499 UNLISTED E&M SERVICE: CPT | Mod: S$GLB,,, | Performed by: FAMILY MEDICINE

## 2018-09-24 PROCEDURE — 3078F DIAST BP <80 MM HG: CPT | Mod: CPTII,,, | Performed by: FAMILY MEDICINE

## 2018-09-24 RX ORDER — TEMAZEPAM 22.5 MG/1
22.5 CAPSULE ORAL NIGHTLY PRN
Qty: 30 CAPSULE | Refills: 1 | Status: SHIPPED | OUTPATIENT
Start: 2018-09-24 | End: 2018-10-01 | Stop reason: CLARIF

## 2018-09-24 NOTE — PROGRESS NOTES
"Routine Office Visit    Patient Name: Glen Arroyo    : 1950  MRN: [id number]    Subjective:  Glen is a 68 y.o. male who presents today for:    1. Insomnia  Patient presenting today with primary insomnia for several months.  He has tried mutliple medications including Mirtazapine, but no relief.  He sleeps a max of 4 hours a night.  He states that restoril 15 mg has not helped, but when he took 2 tablets he "slept like a baby".   He states that he normally wakes up at 2AM and then has trouble going back to sleep.  He has been diagnosed with myelodysplastic syndrome and is currently followed by hem/onc.  He has also been continuing to lose weight despite eating more and drinking supplements (Boost).  There have been no other symptoms.  He is hoarse today, but states that is from having to yell because his wife won't wear her hearing aids at home.    Past Medical History  Past Medical History:   Diagnosis Date    AAA (abdominal aortic aneurysm) 2016    Cancer     LUNG     LEFT    Diverticulitis     HTN (hypertension)     NO LONGER TAKING MEDS    MDS (myelodysplastic syndrome)        Past Surgical History  Past Surgical History:   Procedure Laterality Date    AANGIOPLASTY      STENT    ABDOMINAL AORTIC ANEURYSM REPAIR      APPENDECTOMY      BIOPSY-BONE MARROW Left 3/8/2018    [facility] by Duke [facility] OR [address] [place] [address]    CARDIAC SURGERY      COLOSTOMY      PLACEMENT AND REMOVAL- Diveticulitis related     COLOSTOMY      diverticulitis.  removed 2 foot of colon.     EXCISION OF HYDROCELE Left 2018    Procedure: HYDROCELECTOMY;  Surgeon: TIFFANIE Grant MD;  Location: [facility] OR;  Service: Urology;  Laterality: Left;  PRE-OP BY RN 2018    HYDROCELECTOMY Left 2018    Performed by TIFFANIE Grant MD at [facility] OR    LUNG CANCER SURGERY Left     WEDGE RESECTION    REPAIR ABDOMINAL-AORTIC ANEURYSM-ENDOVASCULAR GRAFT (AAA) with PTA of bilateral iliac and stents of " bilateral iliacs N/A 10/20/2016    Performed by [facility]her Levine MD at [facility] OR 2ND FLR    THORACOSCOPY-VIDEO ASSISTED (VATS) W/WEDGE LUNG RESECTION Left 2018    Performed by Clint Machado MD at [facility] OR 2ND FLR    TONSILLECTOMY         Family History  Family History   Problem Relation Age of Onset    Cancer Mother     Cancer Father     Anesthesia problems Neg Hx        Social History  Social History     Socioeconomic History    Marital status: Single     Spouse name: Not on file    Number of children: Not on file    Years of education: Not on file    Highest education level: Not on file   Social Needs    Financial resource strain: Not on file    Food insecurity - worry: Not on file    Food insecurity - inability: Not on file    Transportation needs - medical: Not on file    Transportation needs - non-medical: Not on file   Occupational History    Not on file   Tobacco Use    Smoking status: Former Smoker     Packs/day: 1.50     Years: 30.00     Pack years: 45.00     Types: Cigarettes     Last attempt to quit: 2017     Years since quittin.7    Smokeless tobacco: Never Used    Tobacco comment: quit 2 months ago     Substance and Sexual Activity    Alcohol use: No     Frequency: Never    Drug use: No    Sexual activity: Not Currently   Other Topics Concern    Not on file   Social History Narrative    Not on file       Current Medications  Current Outpatient Medications on File Prior to Visit   Medication Sig Dispense Refill    cyanocobalamin (VITAMIN B-12) 100 MCG tablet Take 100 mcg by mouth once daily.      epoetin manuel (PROCRIT INJ) Inject as directed once a week.      folic acid (FOLVITE) 800 MCG Tab Take 800 mcg by mouth once daily.      oxyCODONE-acetaminophen (PERCOCET) 5-325 mg per tablet Take 1 tablet by mouth every 6 (six) hours as needed for Pain (Pain related to surgery and cancer diagnosis). 120 tablet 0    zinc gluconate 50 mg tablet Take 50 mg by mouth  "once daily.      [DISCONTINUED] temazepam (RESTORIL) 15 mg Cap Take 1 capsule (15 mg total) by mouth nightly as needed. 30 capsule 0     No current facility-administered medications on file prior to visit.        Allergies   Review of patient's allergies indicates:  No Known Allergies    Review of Systems (Pertinent positives)  Review of Systems   Constitutional: Positive for weight loss.   HENT: Negative.    Eyes: Negative.    Respiratory: Negative.    Cardiovascular: Negative.    Gastrointestinal: Negative.    Genitourinary: Negative.    Musculoskeletal: Positive for back pain and myalgias.   Skin: Negative.    Psychiatric/Behavioral: The patient has insomnia.          /70 (BP Location: Left arm, Patient Position: Sitting, BP Method: Medium (Manual))   Pulse 76   Temp 97.5 °F (36.4 °C) (Oral)   Resp 12   Ht 5' 9" (1.753 m)   Wt 53.2 kg (117 lb 4.6 oz)   SpO2 98%   BMI 17.32 kg/m²     GENERAL APPEARANCE: in no apparent distress and well developed and well nourished  HEENT: PERRL, EOMI, Sclera clear, anicteric, Oropharynx clear, no lesions, Neck supple with midline trachea  NECK: normal, supple, no adenopathy, thyroid normal in size  RESPIRATORY: appears well, vitals normal, no respiratory distress, acyanotic, normal RR, chest clear, no wheezing, crepitations, rhonchi, normal symmetric air entry  HEART: regular rate and rhythm, S1, S2 normal, no murmur, click, rub or gallop.    ABDOMEN: abdomen is soft without tenderness, no masses, no hernias, no organomegaly, no rebound, no guarding. Suprapubic tenderness absent. No CVA tenderness.  SKIN: no rashes, no wounds, no other lesions  PSYCH: Alert, oriented x 3, thought content appropriate, speech normal, pleasant and cooperative, good eye contact, well groomed    Assessment/Plan:  Glen Arroyo is a 68 y.o. male who presents today for :    Diagnoses and all orders for this visit:    Primary insomnia  -     temazepam (RESTORIL) 22.5 MG capsule; Take 1 " capsule (22.5 mg total) by mouth nightly as needed for Insomnia.    Benzodiazepine dependence, continuous  -     temazepam (RESTORIL) 22.5 MG capsule; Take 1 capsule (22.5 mg total) by mouth nightly as needed for Insomnia.    Immunization due  -     (In Office Administered) Pneumococcal Conjugate Vaccine (13 Valent) (IM)      1.  MDS followed by Hem/onc  2.  Follow up with specialists as scheduled  3.  restoril increased to 22.5mg nightly.  4.  Patient to notify me of any medication changes he does at home.  He was told not to exceed the amount that I prescribed, but if he can tolerate taking less that is fine.    Sheldon Bailey MD

## 2018-09-26 ENCOUNTER — DOCUMENTATION ONLY (OUTPATIENT)
Dept: CARDIOTHORACIC SURGERY | Facility: CLINIC | Age: 68
End: 2018-09-26

## 2018-09-26 ENCOUNTER — INFUSION (OUTPATIENT)
Dept: INFUSION THERAPY | Facility: HOSPITAL | Age: 68
End: 2018-09-26
Attending: INTERNAL MEDICINE
Payer: MEDICARE

## 2018-09-26 ENCOUNTER — OFFICE VISIT (OUTPATIENT)
Dept: CARDIOTHORACIC SURGERY | Facility: CLINIC | Age: 68
End: 2018-09-26
Payer: MEDICARE

## 2018-09-26 VITALS
SYSTOLIC BLOOD PRESSURE: 107 MMHG | WEIGHT: 117.06 LBS | DIASTOLIC BLOOD PRESSURE: 68 MMHG | OXYGEN SATURATION: 98 % | BODY MASS INDEX: 17.34 KG/M2 | HEART RATE: 80 BPM | HEIGHT: 69 IN

## 2018-09-26 VITALS
RESPIRATION RATE: 16 BRPM | DIASTOLIC BLOOD PRESSURE: 64 MMHG | TEMPERATURE: 98 F | HEART RATE: 74 BPM | OXYGEN SATURATION: 98 % | SYSTOLIC BLOOD PRESSURE: 130 MMHG

## 2018-09-26 DIAGNOSIS — C34.12 MALIGNANT NEOPLASM OF UPPER LOBE OF LEFT LUNG: Primary | ICD-10-CM

## 2018-09-26 DIAGNOSIS — D46.9 MDS (MYELODYSPLASTIC SYNDROME): Primary | ICD-10-CM

## 2018-09-26 DIAGNOSIS — R91.1 PULMONARY NODULE, LEFT: Primary | ICD-10-CM

## 2018-09-26 DIAGNOSIS — R59.0 MEDIASTINAL LYMPHADENOPATHY: ICD-10-CM

## 2018-09-26 DIAGNOSIS — Z01.818 PRE-OP TESTING: Primary | ICD-10-CM

## 2018-09-26 PROCEDURE — 96372 THER/PROPH/DIAG INJ SC/IM: CPT

## 2018-09-26 PROCEDURE — 99499 UNLISTED E&M SERVICE: CPT | Mod: S$GLB,,, | Performed by: THORACIC SURGERY (CARDIOTHORACIC VASCULAR SURGERY)

## 2018-09-26 PROCEDURE — 63600175 PHARM REV CODE 636 W HCPCS: Mod: JG,EC | Performed by: INTERNAL MEDICINE

## 2018-09-26 PROCEDURE — 1101F PT FALLS ASSESS-DOCD LE1/YR: CPT | Mod: CPTII,,, | Performed by: THORACIC SURGERY (CARDIOTHORACIC VASCULAR SURGERY)

## 2018-09-26 PROCEDURE — 3074F SYST BP LT 130 MM HG: CPT | Mod: CPTII,,, | Performed by: THORACIC SURGERY (CARDIOTHORACIC VASCULAR SURGERY)

## 2018-09-26 PROCEDURE — 3078F DIAST BP <80 MM HG: CPT | Mod: CPTII,,, | Performed by: THORACIC SURGERY (CARDIOTHORACIC VASCULAR SURGERY)

## 2018-09-26 PROCEDURE — 99213 OFFICE O/P EST LOW 20 MIN: CPT | Mod: PBBFAC,25 | Performed by: THORACIC SURGERY (CARDIOTHORACIC VASCULAR SURGERY)

## 2018-09-26 PROCEDURE — 99213 OFFICE O/P EST LOW 20 MIN: CPT | Mod: S$PBB,,, | Performed by: THORACIC SURGERY (CARDIOTHORACIC VASCULAR SURGERY)

## 2018-09-26 PROCEDURE — 99999 PR PBB SHADOW E&M-EST. PATIENT-LVL III: CPT | Mod: PBBFAC,,, | Performed by: THORACIC SURGERY (CARDIOTHORACIC VASCULAR SURGERY)

## 2018-09-26 RX ADMIN — ERYTHROPOIETIN 40000 UNITS: 40000 INJECTION, SOLUTION INTRAVENOUS; SUBCUTANEOUS at 10:09

## 2018-09-26 NOTE — PROGRESS NOTES
PREPARING FOR SURGERY  Your surgery has been scheduled for:   Day: _Thursday_____ Date:  _65-0-09_____  Arrival Time: TBD  Start Time: TBD  Pt informed that surgery clinic will call the day before surgery with pt's arrival time; pt verbalized understanding.  You should report to the second floor surgery center, located on the [facility] side of the second floor of the [facility]. The phone number is [phone].     PLEASE NOTE  · If you are allergic to any medications, please inform your doctor or the nurse responsible for your care.  · Tell the doctor if you take aspirin, products containing aspirin, herbal medications or blood thinners, such as Coumadin, Pradaxa, or Plavix.  · Notify your doctor if you are diabetic and provide information about the medications you take.  · Arrange for someone to drive you home following surgery. You will not be allowed to leave the surgical facility alone or drive yourself home following sedation and anesthesia.  · If you have not already done so, please bring a list of your medications with you the day of your surgery.     BEFORE SURGERY  Stop taking all herbal medications 14 days prior to surgery  Stop taking aspirin, products containing aspirin 0 days before surgery  Stop taking blood thinners 5 days before surgery  Refrain from drinking alcohol beverages for 24 hours before and after surgery  Stop or limit smoking 0 days before surgery  Other: ________________________________________________________     THE NIGHT BEFORE SURGERY  Eat a light supper on the night before your surgery, no greasy or fatty foods.  DO NOT EAT OR DRINK ANYTHING AFTER MIDNIGHT, INCLUDING GUM, HARD CANDY, MINTS, OR CHEWING TOBACCO  Take a complete shower. Wash your body from the neck down with Hibiclens (chlorhexidine gluconate) soap. Hibiclens soap may be purchased over the counter at the pharmacy. Keep the soap away from your eyes, ears, and mouth. After washing with Hibiclens, rinse  "thoroughly. You may also use any soap labeled "antibacterial". Shampoo your hair with your regular shampoo.     THE DAY OF SURGERY  Take another shower with Hibiclens or any antibacterial soap, to reduce the change of infection.  Medications to take the morning of surgery:_______________ with a small sip of water. Do not take diuretics or fluid pills.  Diabetic medication instructions will be given by the preop center. They will call you before your surgery.  You may brush your teeth and rinse your mouth, but do not shallow any water.  Do not apply perfume, powder, body lotions or deodorant on the day of surgery.  Do not wear any makeup, including mascara and false eyelashes.  Nail polish should be removed.  Wear comfortable clothes, such as button front shirt and loose-fitting pants.  Leave all jewelry, including body piercings and valuables at home.  Hairpins and clasps must be removed before you enter the operating room.  You may wear glasses, dentures and hearing aids before and after surgery. They may need to be removed before going into the operating room. Contact lenses worn before surgery must be removed before entering the operating room. Please bring a case for your hearing aids, glasses and/or contacts.  Bring any devices you will need after surgery such as crutches or canes.  If you have sleep apnea, please bring your CPAP machine.  If you have an implantable device, such as a pacemaker or AICD, please bring the device information card, if you have one.     If you have any questions or concerns, please don't hesitate to call.     Charmaine Page RN  RN Clinician  Thoracic and Cardiovascular Surgery  [address] [place] [address] [id number] [phone] fax  "

## 2018-09-26 NOTE — PROGRESS NOTES
"Subjective:       Patient ID: Glen Arroyo is a 68 y.o. male.    Chief Complaint: Follow-up    Diagnosis:  MDS, NSCLC - adenocarcinoma     Pre-operative therapy: none     Procedure(s) and date(s): 1. 4/17/18 - Left VATS DEIRDRE wedge resection     Pathology: 1. 1.4cm moderately differentiated invasive adenocarcinoma, VPI present, LVI present, 3mm parenchyml margin, pT2NX     Post-operative therapy: treatment for MDS, surveillance of NSCLC    HPI   68 y.o. male former smoker here today for follow up s/p left VATS DEIRDRE wedge resection for pT2a invasive adenocarcinoma. Prior to surgery he was also diagnosed with MDS and needed urgent treatment. He currently follows with BMT is deemed intermediate risk and is scheduled for weekly Procrit injections. Recent had left hydrocelectomy.  We have been following a prevascular LN since initial NSCLC diagnosis. Returns today with new PET CT. Today he reports feeling well. Denies fever, chills, SOB, CP, N/V or changes in bowel and bladder functioning.      Review of Systems   Constitutional: Positive for activity change (decreased), appetite change (decreased) and fatigue. Negative for chills, diaphoresis and fever.   HENT: Negative for congestion.    Respiratory: Negative for cough, chest tightness and shortness of breath.    Cardiovascular: Negative for chest pain, palpitations and leg swelling.   Gastrointestinal: Negative for abdominal pain, nausea and vomiting.   Genitourinary: Negative for difficulty urinating.   Musculoskeletal: Positive for arthralgias (L knee).   Skin: Negative for rash.   Neurological: Negative for dizziness and syncope.   Hematological: Bruises/bleeds easily.   Psychiatric/Behavioral: Negative for agitation. The patient is not nervous/anxious.          Objective:       Vitals:    09/26/18 0848   BP: 107/68   Pulse: 80   SpO2: 98%   Weight: 53.1 kg (117 lb 1 oz)   Height: 5' 9" (1.753 m)   PainSc: 0-No pain     Physical Exam   Constitutional: He is " oriented to person, place, and time.   Thin   HENT:   Head: Normocephalic.   Right Ear: External ear normal.   Left Ear: External ear normal.   Mouth/Throat: Oropharynx is clear and moist.   Eyes: Conjunctivae and EOM are normal. Pupils are equal, round, and reactive to light. No scleral icterus.   Neck: Normal range of motion. No thyromegaly present.   Pulmonary/Chest: Effort normal and breath sounds normal.   Abdominal: Soft. He exhibits no distension and no mass. There is no tenderness.   Musculoskeletal: Normal range of motion.   Lymphadenopathy:     He has no cervical adenopathy.   Neurological: He is alert and oriented to person, place, and time.   Skin: Skin is warm and dry. Capillary refill takes less than 2 seconds.   Psychiatric: He has a normal mood and affect.       4/9/18- Stress ECHO CONCLUSIONS     1 - Normal left ventricular systolic function (EF 55-60%).     2 - Concentric remodeling.   No evidence of stress induced myocardial ischemia.     9/20/18- PET- Progression of disease with increased FDG avidity of the pre-vascular lymph node. New focus of FDG avidity involving a right cervical chain lymph node is concerning for metastasis though this could also potentially be reactive in nature.    Assessment:     68 y.o. male former smoker here today for follow up s/p left VATS DEIRDRE wedge resection for pT2a invasive adenocarcinoma and PET avid pre vascular LN    Plan:       Discussed PET results with patient and Dr. Westbrook. Will proceed with left robotic assisted LN biopsy on October 4th,2018. Will get labs the morning of surgery to determine if he needs pre-op transfusion. Appropriate patient education regarding the bruna-operative period as well as intraoperative details were discussed. Risks, including but not limited to, bleeding, infection, pain and anesthetic complication were discussed. Patient was given the opportunity to ask questions and to have those questions answered to their satisfaction.  Patient verbalized understanding to both procedure and associated risks. Consent was obtained.      ATTENDING ATTESTATION:    I evaluated the patient and I agree with the assessment and plan.  Proceed with robotic mediastinal LN dissection.

## 2018-09-27 ENCOUNTER — DOCUMENTATION ONLY (OUTPATIENT)
Dept: CARDIOTHORACIC SURGERY | Facility: HOSPITAL | Age: 68
End: 2018-09-27

## 2018-10-01 ENCOUNTER — LAB VISIT (OUTPATIENT)
Dept: LAB | Facility: HOSPITAL | Age: 68
End: 2018-10-01
Attending: INTERNAL MEDICINE
Payer: MEDICARE

## 2018-10-01 DIAGNOSIS — D46.9 MYELODYSPLASTIC SYNDROME: ICD-10-CM

## 2018-10-01 LAB
BASOPHILS # BLD AUTO: 0.06 K/UL
BASOPHILS NFR BLD: 0.7 %
DIFFERENTIAL METHOD: ABNORMAL
EOSINOPHIL # BLD AUTO: 0.1 K/UL
EOSINOPHIL NFR BLD: 1.1 %
ERYTHROCYTE [DISTWIDTH] IN BLOOD BY AUTOMATED COUNT: 21.8 %
FERRITIN SERPL-MCNC: 1273 NG/ML
HCT VFR BLD AUTO: 36.2 %
HGB BLD-MCNC: 11.7 G/DL
IRON SERPL-MCNC: 59 UG/DL
LYMPHOCYTES # BLD AUTO: 3.2 K/UL
LYMPHOCYTES NFR BLD: 36.6 %
MCH RBC QN AUTO: 26.5 PG
MCHC RBC AUTO-ENTMCNC: 32.3 G/DL
MCV RBC AUTO: 82 FL
MONOCYTES # BLD AUTO: 1.9 K/UL
MONOCYTES NFR BLD: 22.2 %
NEUTROPHILS # BLD AUTO: 3.5 K/UL
NEUTROPHILS NFR BLD: 43.3 %
PLATELET # BLD AUTO: 93 K/UL
PLATELET BLD QL SMEAR: ABNORMAL
PMV BLD AUTO: ABNORMAL FL
RBC # BLD AUTO: 4.42 M/UL
SATURATED IRON: 25 %
TOTAL IRON BINDING CAPACITY: 234 UG/DL
TRANSFERRIN SERPL-MCNC: 158 MG/DL
WBC # BLD AUTO: 8.75 K/UL

## 2018-10-01 PROCEDURE — 82728 ASSAY OF FERRITIN: CPT

## 2018-10-01 PROCEDURE — 83540 ASSAY OF IRON: CPT

## 2018-10-01 PROCEDURE — 36415 COLL VENOUS BLD VENIPUNCTURE: CPT

## 2018-10-01 PROCEDURE — 85025 COMPLETE CBC W/AUTO DIFF WBC: CPT

## 2018-10-01 RX ORDER — TEMAZEPAM 15 MG/1
15 CAPSULE ORAL NIGHTLY PRN
Qty: 30 CAPSULE | Refills: 2 | Status: SHIPPED | OUTPATIENT
Start: 2018-10-01 | End: 2018-10-31

## 2018-10-01 NOTE — TELEPHONE ENCOUNTER
----- Message from Mike Castellano sent at 10/1/2018 11:44 AM CDT -----  Contact: Self/[phone]  The patient is requesting to speak to someone in regards to  temazepam (RESTORIL) 22.5 MG capsule     Thank you

## 2018-10-03 ENCOUNTER — TELEPHONE (OUTPATIENT)
Dept: CARDIOTHORACIC SURGERY | Facility: HOSPITAL | Age: 68
End: 2018-10-03

## 2018-10-03 ENCOUNTER — ANESTHESIA EVENT (OUTPATIENT)
Dept: SURGERY | Facility: HOSPITAL | Age: 68
DRG: 824 | End: 2018-10-03
Payer: MEDICARE

## 2018-10-03 ENCOUNTER — OFFICE VISIT (OUTPATIENT)
Dept: HEMATOLOGY/ONCOLOGY | Facility: CLINIC | Age: 68
DRG: 824 | End: 2018-10-03
Payer: MEDICARE

## 2018-10-03 VITALS
TEMPERATURE: 98 F | WEIGHT: 119.63 LBS | HEIGHT: 70 IN | BODY MASS INDEX: 17.13 KG/M2 | DIASTOLIC BLOOD PRESSURE: 27 MMHG | HEART RATE: 81 BPM | SYSTOLIC BLOOD PRESSURE: 123 MMHG | OXYGEN SATURATION: 98 %

## 2018-10-03 DIAGNOSIS — D46.9 MDS (MYELODYSPLASTIC SYNDROME): ICD-10-CM

## 2018-10-03 DIAGNOSIS — C34.92 ADENOCARCINOMA, LUNG, LEFT: Primary | ICD-10-CM

## 2018-10-03 DIAGNOSIS — I71.40 AAA (ABDOMINAL AORTIC ANEURYSM) WITHOUT RUPTURE: ICD-10-CM

## 2018-10-03 DIAGNOSIS — D69.6 THROMBOCYTOPENIA: ICD-10-CM

## 2018-10-03 DIAGNOSIS — D63.0 ANEMIA IN NEOPLASTIC DISEASE: ICD-10-CM

## 2018-10-03 PROBLEM — D64.9 ABSOLUTE ANEMIA: Status: ACTIVE | Noted: 2018-10-03

## 2018-10-03 PROCEDURE — 99999 PR PBB SHADOW E&M-EST. PATIENT-LVL III: CPT | Mod: PBBFAC,,, | Performed by: INTERNAL MEDICINE

## 2018-10-03 PROCEDURE — 99213 OFFICE O/P EST LOW 20 MIN: CPT | Mod: PBBFAC | Performed by: INTERNAL MEDICINE

## 2018-10-03 PROCEDURE — 3078F DIAST BP <80 MM HG: CPT | Mod: CPTII,,, | Performed by: INTERNAL MEDICINE

## 2018-10-03 PROCEDURE — 99214 OFFICE O/P EST MOD 30 MIN: CPT | Mod: S$PBB,,, | Performed by: INTERNAL MEDICINE

## 2018-10-03 PROCEDURE — 3074F SYST BP LT 130 MM HG: CPT | Mod: CPTII,,, | Performed by: INTERNAL MEDICINE

## 2018-10-03 PROCEDURE — 1101F PT FALLS ASSESS-DOCD LE1/YR: CPT | Mod: CPTII,,, | Performed by: INTERNAL MEDICINE

## 2018-10-03 NOTE — TELEPHONE ENCOUNTER
Called patient to review pre op instructions and time of arrival. He will arrive at [facility] at 5am for his 7am surgery. Patient and his wife voiced understanding of preop and NPO instructions.

## 2018-10-03 NOTE — Clinical Note
continfue weekly CBC beginning oct 15 (SKIP NEXT WEEK and skip next week procrit ) x 4Cbc-standing orders

## 2018-10-03 NOTE — PROGRESS NOTES
Subjective:       Patient ID: Glen Arroyo is a 68 y.o. male.    Chief Complaint: Follow-up    HPI   Diagnosis: 1. MDS IPSS-R INT diagnosed 3/8/2018                    2. NSCLC pT2Nx S/p wedge resection 4/17/2018    HPI: Pt is 67 y/o male with pmhx of AAA, diverticuliltis, HTN , tobacco abuse and ETOH abuse hospitalized Feb 2018.. He was  referred to ED from his PCP for abnormal labs revealing severe anemia with Hb 6g/dl . Pt reports fatigue and mild generalized weakness past month. No SOB or CP. No N/V. No rashes. No HA/vision changes. No melena, hematochezia or change in bowel habits.No bleeding-rectal/nasal/urinary.  No known prior hx of abnormal blood counts.Pt s/p 2uprbc transfusion. CBC 2/7/2018 reveals wbc 2190/mm3 Hb 8.4g/dL Hct 24.2% Plt 37k.  Reticulocytes were 1.4%. Iron studies showed very high serum iron saturation, but was done post transfusion. Serum B12 was normal. Serum folate was low, but RBC folate was normal. He had normal renal and liver functions. Haptoglobin was low, < 10. LDH was normal. Stool OB was negative. Zinc was low. Copper was normal. SPEP did not show any paraprotein. CHAPO was negative. HIV, HBV, HCV serologies were negative. He underwent bmbx 3/2018 and diagnosed with MDS         He  visited ED with neck pain and HA   Workup included CT chest 2/25/2018 reveals spiculated 2.0 x 0.8 cm nodule in the left lung apex    He is followed by CTS at [facility]  He had left VATS and DEIRDRE wedge resection on 4/17/18. Pathology showed 1.4cm, moderately differentiated invasive adenocarcinoma, VPI present, LVI present, 3mm parenchymal margin, pT2NX. He is undergoing surveillance    Chest CT 9/14/18 reveals enlargement of a prevascular LN.  Follow-up PET on 9/20/2018 reveals progression of disease with increased FDG avidity of the pre-vascular lymph node. New focus of FDG avidity   involving a right cervical chain lymph node is concerning for metastasis though this could also potentially be reactive in  "nature    Surgical intervention with  with robotic mediastinal LN dissection planned in am 10/4/2018    He is also diagnosed with MDS IPSS-R INT diagnosed 3/8/2018  He is undergoing weekly Procrit  9.7g/dl  5/9/2018  To   10.6g/dl   8/20/2018 to 11.7g/dl on 10/1/2018      Today, he reports he is doing better.  No cough/CP  MIld MALCOLM  No hemoptysis   Less fatigued  No bleeding-nasal/rectal/urinary     He is followed by Ortho for  arthralgias in knees   He underwent steroid injection w/improvement in sx's   He ambulates with assitance of cane secondary to pain     He has hx of ETOH abuse  He quit smoking " cold turkey" >6 mos ago      FINAL PATHOLOGIC DIAGNOSIS 3/8/2018   BONE MARROW ASPIRATE SMEARS, TOUCH IMPRINTS, CORE BIOPSY, LEFT ILIAC CREST, AND CLOT  SECTION:  --Hypercellular bone marrow with megakaryocyte atypia and ring sideroblasts, see comment.  --Mildly increased reticulin fibrosis (MF-1).  --Mild polytypic plasmacytosis, 5.6%, see comment.    Cytogenetics abnormal Of 20 metaphases, 13 metaphases were normal and 7 metaphases had an unbalanced 1;7  translocation resulting in a 1q duplication and a 7q deletion.    Findings show myelodysplastic syndrome with single lineage dysplasia       Patient followed by Urology and recently underwent   Left hydrocelectomy for Left hydrocele.  He  no longer has lower abdominal pain or testicular pain  since surgery    He is followed by Vascular surgery for incidental finding of 7 cm AAA s/p percutaneous EVAR with bilateral iliac extension with self-expanding stent at aortic bifurcation        Past Medical History:   Diagnosis Date    AAA (abdominal aortic aneurysm) 11/20/2016    Cancer     LUNG     LEFT    Diverticulitis     HTN (hypertension)     NO LONGER TAKING MEDS    MDS (myelodysplastic syndrome)        Past Surgical History:   Procedure Laterality Date    AANGIOPLASTY      STENT    ABDOMINAL AORTIC ANEURYSM REPAIR      APPENDECTOMY      BIOPSY-BONE MARROW " "Left 3/8/2018    Performed by Duke Tyler MD at [facility] OR 2ND FLR    CARDIAC SURGERY      COLOSTOMY      PLACEMENT AND REMOVAL- Diveticulitis related     COLOSTOMY      diverticulitis.  removed 2 foot of colon.     EXCISION OF HYDROCELE Left 8/17/2018    Procedure: HYDROCELECTOMY;  Surgeon: TIFFANIE Grant MD;  Location: [facility] OR;  Service: Urology;  Laterality: Left;  PRE-OP BY RN 8-    HYDROCELECTOMY Left 8/17/2018    Performed by TIFFANIE Grant [facility] at [facility] OR    LUNG CANCER SURGERY Left     WEDGE RESECTION    REPAIR ABDOMINAL-AORTIC ANEURYSM-ENDOVASCULAR GRAFT (AAA) with PTA of bilateral iliac and stents of bilateral iliacs N/A 10/20/2016    Performed by Topher Levine MD at [facility] OR [address] [place] [address]    THORACOSCOPY-VIDEO ASSISTED (VATS) W/WEDGE LUNG RESECTION Left 4/17/2018    [facility]    TONSILLECTOMY         NKDA      Current MEDS: Reviewed and as per MEDCHART      Review of Systems   Constitutional: Positive for appetite change and fatigue. Negative for fever and unexpected weight change.   HENT: Negative for mouth sores.    Eyes: Negative for visual disturbance.   Respiratory: Positive for shortness of breath. Negative for cough.    Cardiovascular: Negative for chest pain.   Gastrointestinal: Negative for abdominal pain and diarrhea.   Genitourinary: Negative for frequency and testicular pain.   Musculoskeletal: Positive for arthralgias. Negative for back pain.   Skin: Negative for rash.   Neurological: Negative for headaches.   Hematological: Negative for adenopathy.   Psychiatric/Behavioral: The patient is not nervous/anxious.        Objective:       Vitals:    10/03/18 1027   BP: (!) 123/27   BP Location: Right arm   Patient Position: Sitting   BP Method: Medium (Automatic)   Pulse: 81   Temp: 98 °F (36.7 °C)   TempSrc: Oral   SpO2: 98%   Weight: 54.2 kg (119 lb 9.6 oz)   Height: 5' 9.5" (1.765 m)       Physical Exam   Constitutional: He " is oriented to person, place, and time. He appears well-developed and well-nourished.   HENT:   Head: Normocephalic.   Mouth/Throat: Oropharynx is clear and moist. No oropharyngeal exudate.   Eyes: Conjunctivae are normal. Pupils are equal, round, and reactive to light. No scleral icterus.   Neck: Normal range of motion. Neck supple. No thyromegaly present.   Cardiovascular: Normal rate, regular rhythm and normal heart sounds.   No murmur heard.  Pulmonary/Chest: Effort normal and breath sounds normal. He has no wheezes. He has no rales.   Abdominal: Soft. Bowel sounds are normal. He exhibits no distension and no mass. There is no hepatosplenomegaly. There is no tenderness. There is no rebound and no guarding.   Musculoskeletal: Normal range of motion. He exhibits no edema.   Lymphadenopathy:     He has no cervical adenopathy.     He has no axillary adenopathy.        Right: No supraclavicular adenopathy present.        Left: No supraclavicular adenopathy present.   Neurological: He is alert and oriented to person, place, and time. No cranial nerve deficit.   Skin: No rash noted. No erythema.   Psychiatric: He has a normal mood and affect.           Results for FUNMI PALACIOS (MRN [id number]) as of 3/20/2018 14:26   Ref. Range 2/6/2018 15:07 2/7/2018 05:23   Thiamine Latest Ref Range: 38 - 122 ug/L  35 (L)   Vitamin B-12 Latest Ref Range: 210 - 950 pg/mL 280    Results for FUNMI PALACIOS (MRN [id number]) as of 3/20/2018 14:26   Ref. Range 2/7/2018 13:29   Zinc, Serum-ALT Latest Ref Range: 60 - 130 ug/dL 38 (L)   Results for FUNMI PALACIOS (MRN [id number]) as of 3/20/2018 14:26   Ref. Range 2/7/2018 13:29   Copper Latest Ref Range: 665 - 1480 ug/L 1178         Lab Results   Component Value Date    WBC 8.75 10/01/2018    HGB 11.7 (L) 10/01/2018    HCT 36.2 (L) 10/01/2018    MCV 82 10/01/2018    PLT 93 (L) 10/01/2018       Lab Results   Component Value Date    WBC 8.75 10/01/2018    HGB 11.7 (L) 10/01/2018    HCT 36.2  "(L) 10/01/2018    MCV 82 10/01/2018    PLT 93 (L) 10/01/2018       Lab Results   Component Value Date    IRON 59 10/01/2018    TIBC 234 (L) 10/01/2018    FERRITIN 1,273 (H) 10/01/2018           Pathology 3/8/2018   FINAL PATHOLOGIC DIAGNOSIS  BONE MARROW ASPIRATE SMEARS, TOUCH IMPRINTS, CORE BIOPSY, LEFT ILIAC CREST, AND CLOT  SECTION:  --Hypercellular bone marrow with megakaryocyte atypia and ring sideroblasts, see comment.  --Mildly increased reticulin fibrosis (MF-1).  --Mild polytypic plasmacytosis, 5.6%, see comment.  COMMENT: The core biopsy is hypercellular for age (50%) and features panhyperplasia. The megakaryocytes are  predominantly small and hypolobated. Blasts are not increased by morphology or in the corresponding flow  cytometric analysis (please see separate report). There is mild erythroid atypia and approximately 7.5% of the  erythroid precursors are ring sideroblasts. Taken together, these findings are concerning for a myelodysplastic  syndrome; however, all other non-neoplastic causes for these features must be excluded, and correlation with the  corresponding cytogenetics analysis is required. Given the presence of ring sideroblasts, NGS including SF3B1  mutational analysis is being performed at [facility], and these results will be reported in a  supplemental report. Additionally, there is a mild polytypic plasmacytosis, which may be seen in association with  bacterial and viral infections, autoimmune conditions, cirrhosis, and in association with neoplastic disorders.    Supplemental Diagnosis  Please also see cytogenetic karyotype results reported in Epic from [facility] [place], operated by [facility], [address] [place], [place] [id number], which give, in part, the following results:  "The result is abnormal. Of 20 metaphases, 13 metaphases were normal and 7 metaphases had an unbalanced 1;7  translocation resulting in a 1q duplication and a 7q deletion. This " "translocation has been associated with both de  bill and therapy-related MDS and AML (Do M et al., Leukemia 21(5);992-7, 2007)."  These findings support a neoplastic etiology for the morphologic features seen, and this case is best classified as a  myelodysplastic syndrome with single lineage dysplasia pending NGS for SF3B1 mutational analysis.          CT head 5/16/2018 -No acute intracranial abnormality.    Results for FUNMI PALACIOS (MRN [id number]) as of 7/12/2018 08:22   Ref. Range 6/4/2018 09:31   Erythropoietin Latest Ref Range: 2.6 - 18.5 mIU/mL 90.1 (H)     CT chest w/contrast 9/14/2018   In this patient with a history of adenocarcinoma status post left upper lobe wedge resection, there is no evidence of recurrence.    Bilateral pulmonary micronodules which may be seen in normal individuals.  These may be followed up with expected oncologic surveillance.    Prominent perivascular lymph node which has increased in size over multiple prior studies and was mildly hypermetabolic on prior PET-CT.    9/20/18- PET- Progression of disease with increased FDG avidity of the pre-vascular lymph node. New focus of FDG avidity   involving a right cervical chain lymph node is concerning for metastasis though this could also potentially be reactive in nature        Assessment:       1. Adenocarcinoma, lung, left    2. MDS (myelodysplastic syndrome)    3. Anemia in neoplastic disease    4. Thrombocytopenia    5. AAA (abdominal aortic aneurysm) without rupture        Plan:   1-5  67-year-old male with significant comorbidities with  diagnosed MDS 3/18/2018  He has dysplastic changes in his marrow, with ringed sideroblasts comprising 7.5% of the RBC precursors. NGS panel pending. Cytogenetics revealed unbalanced 1;7 translocation resulting in a 1q duplication and a 7q deletion in 7 of the 20 metaphases.Blasts were not increased.  IPSS-R INT  Score is 4.5, putting him in the intermediate risk category. Median time to 25% " AML evolution is~3.2 years.I discussed treatment of intermediate risk MDS.   Pt also followed by Dr. Truong  It has been determined that conservative/low intensity treatments with EPO analogues, GCSF, transfusions can be tried to assess response.  Hypomethylating agent is an option as well.  NGS panel showed ASXL1, CBL and SEPBP1 mutations. There are no direct therapeutic implications for these mutations at this time.  There are no clinical trials available here at this time for MDS without excess blasts.  HIV NEG  Hep panel NEG    .Hb  11.7 g/dl  Cont conservative therapy   Hold Procrit next week  Pt responding to conservative therapy  Plan to cont weekly  EPO inj ( pending lab parameters) beginning week of Oct 15  EPO level < 500       Adenocarcinoma lung: pT2Nx. S/p wedge resection. He could get EBUS. There was an indeterminate prevascular lymph node on PET CT. Surveillance is needed.  . CT head 5/16/2018 -No acute intracranial abnormality.    Discussed radiographic findings in detail with patient  9/20/18- PET- Progression of disease with increased FDG avidity of the pre-vascular lymph node. New focus of FDG avidity   involving a right cervical chain lymph node is concerning for metastasis though this could also potentially be reactive in nature  Pt followed closely by CT surg and left robotic assisted LN biopsy planned on October 4th,2018    CBC weekly beg 10/15  Follow-up in 2wks    Cc: Clint Machado MD

## 2018-10-04 ENCOUNTER — ANESTHESIA (OUTPATIENT)
Dept: SURGERY | Facility: HOSPITAL | Age: 68
DRG: 824 | End: 2018-10-04
Payer: MEDICARE

## 2018-10-04 ENCOUNTER — HOSPITAL ENCOUNTER (INPATIENT)
Facility: HOSPITAL | Age: 68
LOS: 1 days | Discharge: HOME | DRG: 824 | End: 2018-10-05
Attending: THORACIC SURGERY (CARDIOTHORACIC VASCULAR SURGERY) | Admitting: THORACIC SURGERY (CARDIOTHORACIC VASCULAR SURGERY)
Payer: MEDICARE

## 2018-10-04 DIAGNOSIS — R59.0 MEDIASTINAL ADENOPATHY: ICD-10-CM

## 2018-10-04 DIAGNOSIS — C34.92 ADENOCARCINOMA, LUNG, LEFT: Primary | ICD-10-CM

## 2018-10-04 LAB
ABO + RH BLD: NORMAL
ANISOCYTOSIS BLD QL SMEAR: SLIGHT
BASO STIPL BLD QL SMEAR: ABNORMAL
BASOPHILS # BLD AUTO: 0.1 K/UL
BASOPHILS NFR BLD: 1.1 %
BLD GP AB SCN CELLS X3 SERPL QL: NORMAL
DACRYOCYTES BLD QL SMEAR: ABNORMAL
DIFFERENTIAL METHOD: ABNORMAL
EOSINOPHIL # BLD AUTO: 0.2 K/UL
EOSINOPHIL NFR BLD: 1.7 %
ERYTHROCYTE [DISTWIDTH] IN BLOOD BY AUTOMATED COUNT: 22.3 %
GIANT PLATELETS BLD QL SMEAR: PRESENT
HCT VFR BLD AUTO: 36.1 %
HGB BLD-MCNC: 11.5 G/DL
HYPOCHROMIA BLD QL SMEAR: ABNORMAL
IMM GRANULOCYTES # BLD AUTO: 0.39 K/UL
IMM GRANULOCYTES NFR BLD AUTO: 4.4 %
LYMPHOCYTES # BLD AUTO: 1.7 K/UL
LYMPHOCYTES NFR BLD: 19.5 %
MCH RBC QN AUTO: 26.7 PG
MCHC RBC AUTO-ENTMCNC: 31.9 G/DL
MCV RBC AUTO: 84 FL
MONOCYTES # BLD AUTO: 3.2 K/UL
MONOCYTES NFR BLD: 35.8 %
NEUTROPHILS # BLD AUTO: 3.3 K/UL
NEUTROPHILS NFR BLD: 37.5 %
NRBC BLD-RTO: 0 /100 WBC
OVALOCYTES BLD QL SMEAR: ABNORMAL
PLATELET # BLD AUTO: 73 K/UL
PLATELET BLD QL SMEAR: ABNORMAL
PMV BLD AUTO: ABNORMAL FL
POIKILOCYTOSIS BLD QL SMEAR: SLIGHT
POLYCHROMASIA BLD QL SMEAR: ABNORMAL
RBC # BLD AUTO: 4.31 M/UL
WBC # BLD AUTO: 8.83 K/UL

## 2018-10-04 PROCEDURE — 86920 COMPATIBILITY TEST SPIN: CPT

## 2018-10-04 PROCEDURE — 36620 INSERTION CATHETER ARTERY: CPT | Mod: 59,,, | Performed by: ANESTHESIOLOGY

## 2018-10-04 PROCEDURE — 8E0W4CZ ROBOTIC ASSISTED PROCEDURE OF TRUNK REGION, PERCUTANEOUS ENDOSCOPIC APPROACH: ICD-10-PCS | Performed by: THORACIC SURGERY (CARDIOTHORACIC VASCULAR SURGERY)

## 2018-10-04 PROCEDURE — 27100025 HC TUBING, SET FLUID WARMER: Performed by: STUDENT IN AN ORGANIZED HEALTH CARE EDUCATION/TRAINING PROGRAM

## 2018-10-04 PROCEDURE — 63600175 PHARM REV CODE 636 W HCPCS: Performed by: PHYSICIAN ASSISTANT

## 2018-10-04 PROCEDURE — 25000003 PHARM REV CODE 250: Performed by: STUDENT IN AN ORGANIZED HEALTH CARE EDUCATION/TRAINING PROGRAM

## 2018-10-04 PROCEDURE — C1729 CATH, DRAINAGE: HCPCS | Performed by: THORACIC SURGERY (CARDIOTHORACIC VASCULAR SURGERY)

## 2018-10-04 PROCEDURE — 63600175 PHARM REV CODE 636 W HCPCS: Performed by: STUDENT IN AN ORGANIZED HEALTH CARE EDUCATION/TRAINING PROGRAM

## 2018-10-04 PROCEDURE — 27201423 OPTIME MED/SURG SUP & DEVICES STERILE SUPPLY: Performed by: THORACIC SURGERY (CARDIOTHORACIC VASCULAR SURGERY)

## 2018-10-04 PROCEDURE — C1751 CATH, INF, PER/CENT/MIDLINE: HCPCS | Performed by: STUDENT IN AN ORGANIZED HEALTH CARE EDUCATION/TRAINING PROGRAM

## 2018-10-04 PROCEDURE — 71000039 HC RECOVERY, EACH ADD'L HOUR: Performed by: THORACIC SURGERY (CARDIOTHORACIC VASCULAR SURGERY)

## 2018-10-04 PROCEDURE — 20600001 HC STEP DOWN PRIVATE ROOM

## 2018-10-04 PROCEDURE — 32608 THORACOSCOPY W/BX NODULE: CPT | Mod: LT,,, | Performed by: THORACIC SURGERY (CARDIOTHORACIC VASCULAR SURGERY)

## 2018-10-04 PROCEDURE — 94640 AIRWAY INHALATION TREATMENT: CPT

## 2018-10-04 PROCEDURE — 37000008 HC ANESTHESIA 1ST 15 MINUTES: Performed by: THORACIC SURGERY (CARDIOTHORACIC VASCULAR SURGERY)

## 2018-10-04 PROCEDURE — 86901 BLOOD TYPING SEROLOGIC RH(D): CPT

## 2018-10-04 PROCEDURE — 25000003 PHARM REV CODE 250: Performed by: PHYSICIAN ASSISTANT

## 2018-10-04 PROCEDURE — 88341 IMHCHEM/IMCYTCHM EA ADD ANTB: CPT | Mod: 26,,, | Performed by: PATHOLOGY

## 2018-10-04 PROCEDURE — 27000221 HC OXYGEN, UP TO 24 HOURS

## 2018-10-04 PROCEDURE — 27201037 HC PRESSURE MONITORING SET UP

## 2018-10-04 PROCEDURE — 99499 UNLISTED E&M SERVICE: CPT | Mod: ,,, | Performed by: PHYSICIAN ASSISTANT

## 2018-10-04 PROCEDURE — 25000003 PHARM REV CODE 250: Performed by: THORACIC SURGERY (CARDIOTHORACIC VASCULAR SURGERY)

## 2018-10-04 PROCEDURE — 88305 TISSUE EXAM BY PATHOLOGIST: CPT | Mod: 26,,, | Performed by: PATHOLOGY

## 2018-10-04 PROCEDURE — 88307 TISSUE EXAM BY PATHOLOGIST: CPT | Mod: 26,,, | Performed by: PATHOLOGY

## 2018-10-04 PROCEDURE — 63600175 PHARM REV CODE 636 W HCPCS: Performed by: THORACIC SURGERY (CARDIOTHORACIC VASCULAR SURGERY)

## 2018-10-04 PROCEDURE — 36000713 HC OR TIME LEV V EA ADD 15 MIN: Performed by: THORACIC SURGERY (CARDIOTHORACIC VASCULAR SURGERY)

## 2018-10-04 PROCEDURE — 94761 N-INVAS EAR/PLS OXIMETRY MLT: CPT

## 2018-10-04 PROCEDURE — 36000712 HC OR TIME LEV V 1ST 15 MIN: Performed by: THORACIC SURGERY (CARDIOTHORACIC VASCULAR SURGERY)

## 2018-10-04 PROCEDURE — 0BNG4ZZ RELEASE LEFT UPPER LUNG LOBE, PERCUTANEOUS ENDOSCOPIC APPROACH: ICD-10-PCS | Performed by: THORACIC SURGERY (CARDIOTHORACIC VASCULAR SURGERY)

## 2018-10-04 PROCEDURE — 0BBJ4ZX EXCISION OF LEFT LOWER LUNG LOBE, PERCUTANEOUS ENDOSCOPIC APPROACH, DIAGNOSTIC: ICD-10-PCS | Performed by: THORACIC SURGERY (CARDIOTHORACIC VASCULAR SURGERY)

## 2018-10-04 PROCEDURE — 25000242 PHARM REV CODE 250 ALT 637 W/ HCPCS: Performed by: THORACIC SURGERY (CARDIOTHORACIC VASCULAR SURGERY)

## 2018-10-04 PROCEDURE — C9290 INJ, BUPIVACAINE LIPOSOME: HCPCS | Performed by: THORACIC SURGERY (CARDIOTHORACIC VASCULAR SURGERY)

## 2018-10-04 PROCEDURE — 07B74ZX EXCISION OF THORAX LYMPHATIC, PERCUTANEOUS ENDOSCOPIC APPROACH, DIAGNOSTIC: ICD-10-PCS | Performed by: THORACIC SURGERY (CARDIOTHORACIC VASCULAR SURGERY)

## 2018-10-04 PROCEDURE — 85025 COMPLETE CBC W/AUTO DIFF WBC: CPT

## 2018-10-04 PROCEDURE — 37000009 HC ANESTHESIA EA ADD 15 MINS: Performed by: THORACIC SURGERY (CARDIOTHORACIC VASCULAR SURGERY)

## 2018-10-04 PROCEDURE — 88342 IMHCHEM/IMCYTCHM 1ST ANTB: CPT | Mod: 26,,, | Performed by: PATHOLOGY

## 2018-10-04 PROCEDURE — 71000033 HC RECOVERY, INTIAL HOUR: Performed by: THORACIC SURGERY (CARDIOTHORACIC VASCULAR SURGERY)

## 2018-10-04 PROCEDURE — 88305 TISSUE EXAM BY PATHOLOGIST: CPT | Performed by: PATHOLOGY

## 2018-10-04 PROCEDURE — D9220A PRA ANESTHESIA: Mod: ,,, | Performed by: ANESTHESIOLOGY

## 2018-10-04 RX ORDER — MIDAZOLAM HYDROCHLORIDE 1 MG/ML
INJECTION, SOLUTION INTRAMUSCULAR; INTRAVENOUS
Status: DISCONTINUED | OUTPATIENT
Start: 2018-10-04 | End: 2018-10-04

## 2018-10-04 RX ORDER — PHENYLEPHRINE HYDROCHLORIDE 10 MG/ML
INJECTION INTRAVENOUS
Status: DISCONTINUED | OUTPATIENT
Start: 2018-10-04 | End: 2018-10-04

## 2018-10-04 RX ORDER — PROPOFOL 10 MG/ML
VIAL (ML) INTRAVENOUS
Status: DISCONTINUED | OUTPATIENT
Start: 2018-10-04 | End: 2018-10-04

## 2018-10-04 RX ORDER — HYDROMORPHONE HYDROCHLORIDE 1 MG/ML
0.2 INJECTION, SOLUTION INTRAMUSCULAR; INTRAVENOUS; SUBCUTANEOUS EVERY 5 MIN PRN
Status: DISCONTINUED | OUTPATIENT
Start: 2018-10-04 | End: 2018-10-04 | Stop reason: HOSPADM

## 2018-10-04 RX ORDER — ONDANSETRON 4 MG/1
4 TABLET, ORALLY DISINTEGRATING ORAL EVERY 6 HOURS PRN
Status: DISCONTINUED | OUTPATIENT
Start: 2018-10-04 | End: 2018-10-05 | Stop reason: HOSPADM

## 2018-10-04 RX ORDER — AMOXICILLIN 250 MG
1 CAPSULE ORAL 2 TIMES DAILY
Status: DISCONTINUED | OUTPATIENT
Start: 2018-10-04 | End: 2018-10-05 | Stop reason: HOSPADM

## 2018-10-04 RX ORDER — OXYCODONE AND ACETAMINOPHEN 10; 325 MG/1; MG/1
TABLET ORAL
Status: DISPENSED
Start: 2018-10-04 | End: 2018-10-04

## 2018-10-04 RX ORDER — ACETAMINOPHEN 325 MG/1
650 TABLET ORAL EVERY 4 HOURS PRN
Status: DISCONTINUED | OUTPATIENT
Start: 2018-10-04 | End: 2018-10-05 | Stop reason: HOSPADM

## 2018-10-04 RX ORDER — OXYCODONE AND ACETAMINOPHEN 10; 325 MG/1; MG/1
1 TABLET ORAL EVERY 4 HOURS PRN
Status: DISCONTINUED | OUTPATIENT
Start: 2018-10-04 | End: 2018-10-05 | Stop reason: HOSPADM

## 2018-10-04 RX ORDER — IPRATROPIUM BROMIDE AND ALBUTEROL SULFATE 2.5; .5 MG/3ML; MG/3ML
3 SOLUTION RESPIRATORY (INHALATION) EVERY 6 HOURS
Status: DISCONTINUED | OUTPATIENT
Start: 2018-10-04 | End: 2018-10-05 | Stop reason: HOSPADM

## 2018-10-04 RX ORDER — LIDOCAINE 50 MG/G
1 PATCH TOPICAL
Status: DISCONTINUED | OUTPATIENT
Start: 2018-10-04 | End: 2018-10-05 | Stop reason: HOSPADM

## 2018-10-04 RX ORDER — METOCLOPRAMIDE HYDROCHLORIDE 5 MG/ML
5 INJECTION INTRAMUSCULAR; INTRAVENOUS EVERY 6 HOURS PRN
Status: DISCONTINUED | OUTPATIENT
Start: 2018-10-04 | End: 2018-10-05 | Stop reason: HOSPADM

## 2018-10-04 RX ORDER — SODIUM CHLORIDE 9 MG/ML
INJECTION, SOLUTION INTRAVENOUS CONTINUOUS PRN
Status: DISCONTINUED | OUTPATIENT
Start: 2018-10-04 | End: 2018-10-04

## 2018-10-04 RX ORDER — CEFAZOLIN SODIUM 1 G/3ML
2 INJECTION, POWDER, FOR SOLUTION INTRAMUSCULAR; INTRAVENOUS
Status: COMPLETED | OUTPATIENT
Start: 2018-10-04 | End: 2018-10-04

## 2018-10-04 RX ORDER — FENTANYL CITRATE 50 UG/ML
INJECTION, SOLUTION INTRAMUSCULAR; INTRAVENOUS
Status: DISCONTINUED | OUTPATIENT
Start: 2018-10-04 | End: 2018-10-04

## 2018-10-04 RX ORDER — LIDOCAINE HCL/PF 100 MG/5ML
SYRINGE (ML) INTRAVENOUS
Status: DISCONTINUED | OUTPATIENT
Start: 2018-10-04 | End: 2018-10-04

## 2018-10-04 RX ORDER — LORAZEPAM 2 MG/ML
0.25 INJECTION INTRAMUSCULAR ONCE AS NEEDED
Status: DISCONTINUED | OUTPATIENT
Start: 2018-10-04 | End: 2018-10-04 | Stop reason: HOSPADM

## 2018-10-04 RX ORDER — OXYCODONE AND ACETAMINOPHEN 10; 325 MG/1; MG/1
2 TABLET ORAL EVERY 4 HOURS PRN
Status: DISCONTINUED | OUTPATIENT
Start: 2018-10-04 | End: 2018-10-05 | Stop reason: HOSPADM

## 2018-10-04 RX ORDER — MEPERIDINE HYDROCHLORIDE 50 MG/ML
12.5 INJECTION INTRAMUSCULAR; INTRAVENOUS; SUBCUTANEOUS ONCE AS NEEDED
Status: DISCONTINUED | OUTPATIENT
Start: 2018-10-04 | End: 2018-10-04 | Stop reason: HOSPADM

## 2018-10-04 RX ORDER — CEFAZOLIN SODIUM 1 G/3ML
2 INJECTION, POWDER, FOR SOLUTION INTRAMUSCULAR; INTRAVENOUS
Status: COMPLETED | OUTPATIENT
Start: 2018-10-04 | End: 2018-10-05

## 2018-10-04 RX ORDER — ONDANSETRON 2 MG/ML
INJECTION INTRAMUSCULAR; INTRAVENOUS
Status: DISCONTINUED | OUTPATIENT
Start: 2018-10-04 | End: 2018-10-04

## 2018-10-04 RX ORDER — ENOXAPARIN SODIUM 100 MG/ML
40 INJECTION SUBCUTANEOUS
Status: DISCONTINUED | OUTPATIENT
Start: 2018-10-05 | End: 2018-10-05 | Stop reason: HOSPADM

## 2018-10-04 RX ORDER — SODIUM CHLORIDE 0.9 % (FLUSH) 0.9 %
3 SYRINGE (ML) INJECTION
Status: DISCONTINUED | OUTPATIENT
Start: 2018-10-04 | End: 2018-10-04

## 2018-10-04 RX ORDER — MORPHINE SULFATE 4 MG/ML
4 INJECTION, SOLUTION INTRAMUSCULAR; INTRAVENOUS
Status: DISCONTINUED | OUTPATIENT
Start: 2018-10-05 | End: 2018-10-05 | Stop reason: HOSPADM

## 2018-10-04 RX ORDER — SODIUM CHLORIDE 9 MG/ML
INJECTION, SOLUTION INTRAVENOUS CONTINUOUS
Status: DISCONTINUED | OUTPATIENT
Start: 2018-10-04 | End: 2018-10-04

## 2018-10-04 RX ORDER — ROCURONIUM BROMIDE 10 MG/ML
INJECTION, SOLUTION INTRAVENOUS
Status: DISCONTINUED | OUTPATIENT
Start: 2018-10-04 | End: 2018-10-04

## 2018-10-04 RX ORDER — OXYCODONE AND ACETAMINOPHEN 10; 325 MG/1; MG/1
1 TABLET ORAL EVERY 4 HOURS PRN
Status: DISCONTINUED | OUTPATIENT
Start: 2018-10-04 | End: 2018-10-04

## 2018-10-04 RX ORDER — FENTANYL CITRATE 50 UG/ML
25 INJECTION, SOLUTION INTRAMUSCULAR; INTRAVENOUS EVERY 5 MIN PRN
Status: COMPLETED | OUTPATIENT
Start: 2018-10-04 | End: 2018-10-04

## 2018-10-04 RX ORDER — HYDROMORPHONE HYDROCHLORIDE 1 MG/ML
0.5 INJECTION, SOLUTION INTRAMUSCULAR; INTRAVENOUS; SUBCUTANEOUS
Status: DISCONTINUED | OUTPATIENT
Start: 2018-10-04 | End: 2018-10-04

## 2018-10-04 RX ORDER — RAMELTEON 8 MG/1
8 TABLET ORAL ONCE
Status: COMPLETED | OUTPATIENT
Start: 2018-10-04 | End: 2018-10-05

## 2018-10-04 RX ORDER — KETAMINE HCL IN 0.9 % NACL 50 MG/5 ML
SYRINGE (ML) INTRAVENOUS
Status: DISCONTINUED | OUTPATIENT
Start: 2018-10-04 | End: 2018-10-04

## 2018-10-04 RX ORDER — LACTULOSE 10 G/15ML
20 SOLUTION ORAL EVERY 6 HOURS PRN
Status: DISCONTINUED | OUTPATIENT
Start: 2018-10-04 | End: 2018-10-05 | Stop reason: HOSPADM

## 2018-10-04 RX ORDER — OXYCODONE AND ACETAMINOPHEN 5; 325 MG/1; MG/1
1 TABLET ORAL EVERY 4 HOURS PRN
Status: DISCONTINUED | OUTPATIENT
Start: 2018-10-04 | End: 2018-10-04

## 2018-10-04 RX ORDER — HYDROMORPHONE HYDROCHLORIDE 1 MG/ML
0.5 INJECTION, SOLUTION INTRAMUSCULAR; INTRAVENOUS; SUBCUTANEOUS EVERY 6 HOURS PRN
Status: DISCONTINUED | OUTPATIENT
Start: 2018-10-04 | End: 2018-10-04

## 2018-10-04 RX ORDER — BISACODYL 10 MG
10 SUPPOSITORY, RECTAL RECTAL DAILY PRN
Status: DISCONTINUED | OUTPATIENT
Start: 2018-10-04 | End: 2018-10-05 | Stop reason: HOSPADM

## 2018-10-04 RX ORDER — LIDOCAINE HYDROCHLORIDE 10 MG/ML
1 INJECTION, SOLUTION EPIDURAL; INFILTRATION; INTRACAUDAL; PERINEURAL ONCE
Status: DISCONTINUED | OUTPATIENT
Start: 2018-10-04 | End: 2018-10-04

## 2018-10-04 RX ORDER — PANTOPRAZOLE SODIUM 40 MG/1
40 TABLET, DELAYED RELEASE ORAL
Status: DISCONTINUED | OUTPATIENT
Start: 2018-10-05 | End: 2018-10-05 | Stop reason: HOSPADM

## 2018-10-04 RX ORDER — POLYETHYLENE GLYCOL 3350 17 G/17G
17 POWDER, FOR SOLUTION ORAL DAILY
Status: DISCONTINUED | OUTPATIENT
Start: 2018-10-04 | End: 2018-10-05 | Stop reason: HOSPADM

## 2018-10-04 RX ADMIN — Medication 25 MG: at 08:10

## 2018-10-04 RX ADMIN — OXYCODONE HYDROCHLORIDE AND ACETAMINOPHEN 1 TABLET: 10; 325 TABLET ORAL at 11:10

## 2018-10-04 RX ADMIN — CEFAZOLIN 2 G: 1 INJECTION, POWDER, FOR SOLUTION INTRAMUSCULAR; INTRAVENOUS at 11:10

## 2018-10-04 RX ADMIN — PHENYLEPHRINE HYDROCHLORIDE 100 MCG: 10 INJECTION INTRAVENOUS at 07:10

## 2018-10-04 RX ADMIN — LIDOCAINE 1 PATCH: 50 PATCH TOPICAL at 04:10

## 2018-10-04 RX ADMIN — SUGAMMADEX 200 MG: 100 INJECTION, SOLUTION INTRAVENOUS at 10:10

## 2018-10-04 RX ADMIN — PHENYLEPHRINE HYDROCHLORIDE 100 MCG: 10 INJECTION INTRAVENOUS at 08:10

## 2018-10-04 RX ADMIN — LIDOCAINE HYDROCHLORIDE 100 MG: 20 INJECTION, SOLUTION INTRAVENOUS at 07:10

## 2018-10-04 RX ADMIN — SODIUM CHLORIDE, SODIUM GLUCONATE, SODIUM ACETATE, POTASSIUM CHLORIDE, MAGNESIUM CHLORIDE, SODIUM PHOSPHATE, DIBASIC, AND POTASSIUM PHOSPHATE: .53; .5; .37; .037; .03; .012; .00082 INJECTION, SOLUTION INTRAVENOUS at 07:10

## 2018-10-04 RX ADMIN — CEFAZOLIN 2 G: 1 INJECTION, POWDER, FOR SOLUTION INTRAMUSCULAR; INTRAVENOUS at 04:10

## 2018-10-04 RX ADMIN — PROPOFOL 120 MG: 10 INJECTION, EMULSION INTRAVENOUS at 07:10

## 2018-10-04 RX ADMIN — SENNOSIDES AND DOCUSATE SODIUM 1 TABLET: 8.6; 5 TABLET ORAL at 12:10

## 2018-10-04 RX ADMIN — FENTANYL CITRATE 25 MCG: 50 INJECTION INTRAMUSCULAR; INTRAVENOUS at 12:10

## 2018-10-04 RX ADMIN — ONDANSETRON 4 MG: 2 INJECTION INTRAMUSCULAR; INTRAVENOUS at 10:10

## 2018-10-04 RX ADMIN — OXYCODONE HYDROCHLORIDE AND ACETAMINOPHEN 2 TABLET: 10; 325 TABLET ORAL at 08:10

## 2018-10-04 RX ADMIN — FENTANYL CITRATE 50 MCG: 50 INJECTION, SOLUTION INTRAMUSCULAR; INTRAVENOUS at 08:10

## 2018-10-04 RX ADMIN — FENTANYL CITRATE 25 MCG: 50 INJECTION INTRAMUSCULAR; INTRAVENOUS at 11:10

## 2018-10-04 RX ADMIN — IPRATROPIUM BROMIDE AND ALBUTEROL SULFATE 3 ML: .5; 3 SOLUTION RESPIRATORY (INHALATION) at 08:10

## 2018-10-04 RX ADMIN — CEFAZOLIN 2 G: 330 INJECTION, POWDER, FOR SOLUTION INTRAMUSCULAR; INTRAVENOUS at 08:10

## 2018-10-04 RX ADMIN — SODIUM CHLORIDE: 0.9 INJECTION, SOLUTION INTRAVENOUS at 07:10

## 2018-10-04 RX ADMIN — ROCURONIUM BROMIDE 20 MG: 10 INJECTION, SOLUTION INTRAVENOUS at 08:10

## 2018-10-04 RX ADMIN — MIDAZOLAM HYDROCHLORIDE 1 MG: 1 INJECTION, SOLUTION INTRAMUSCULAR; INTRAVENOUS at 07:10

## 2018-10-04 RX ADMIN — FENTANYL CITRATE 100 MCG: 50 INJECTION, SOLUTION INTRAMUSCULAR; INTRAVENOUS at 07:10

## 2018-10-04 RX ADMIN — ROCURONIUM BROMIDE 50 MG: 10 INJECTION, SOLUTION INTRAVENOUS at 07:10

## 2018-10-04 RX ADMIN — POLYETHYLENE GLYCOL 3350 17 G: 17 POWDER, FOR SOLUTION ORAL at 12:10

## 2018-10-04 RX ADMIN — ROCURONIUM BROMIDE 20 MG: 10 INJECTION, SOLUTION INTRAVENOUS at 09:10

## 2018-10-04 RX ADMIN — HYDROMORPHONE HYDROCHLORIDE 0.5 MG: 1 INJECTION, SOLUTION INTRAMUSCULAR; INTRAVENOUS; SUBCUTANEOUS at 02:10

## 2018-10-04 RX ADMIN — OXYCODONE HYDROCHLORIDE AND ACETAMINOPHEN 2 TABLET: 10; 325 TABLET ORAL at 04:10

## 2018-10-04 RX ADMIN — SODIUM CHLORIDE, SODIUM GLUCONATE, SODIUM ACETATE, POTASSIUM CHLORIDE, MAGNESIUM CHLORIDE, SODIUM PHOSPHATE, DIBASIC, AND POTASSIUM PHOSPHATE: .53; .5; .37; .037; .03; .012; .00082 INJECTION, SOLUTION INTRAVENOUS at 08:10

## 2018-10-04 RX ADMIN — IPRATROPIUM BROMIDE AND ALBUTEROL SULFATE 3 ML: .5; 3 SOLUTION RESPIRATORY (INHALATION) at 01:10

## 2018-10-04 RX ADMIN — PHENYLEPHRINE HYDROCHLORIDE 200 MCG: 10 INJECTION INTRAVENOUS at 07:10

## 2018-10-04 RX ADMIN — SENNOSIDES AND DOCUSATE SODIUM 1 TABLET: 8.6; 5 TABLET ORAL at 08:10

## 2018-10-04 NOTE — OP NOTE
[facility]  Cardiothoracic Surgery  Operative Note    SUMMARY     Patient Identification: Glen Arroyo; MRN: [id number]; : 1950    Date of Procedure: 10/4/2018     Procedure: Procedure(s) (LRB):  XI ROBOTIC MEDIASTINAL LYMPH NODE BIOPSY (Left)  THORACOSCOPIC WEDGE RESECTION, LUNG (Left)    Surgeon(s) and Role:     * Clint Machado MD - Primary     * NICOLAS Lockwood-TYREE - Assisting     * Marta Anglin MD - Resident - Assisting     * Franklin Landis MD - Fellow    Pre-Operative Diagnosis: Pulmonary nodule, left [R91.1]    Post-Operative Diagnosis: Post-Op Diagnosis Codes:     * Pulmonary nodule, left [R91.1]    Anesthesia: General    Medications: 2 grams IV Ancef    Indications: This is a 68 year-old gentleman with a history of myelodysplastic syndrome (MDS) and adenocarcinoma of the left upper lobe for which he underwent a therapeutic wedge resection.  He previously underwent a limited procedure for his primary lung cancer as he needed to be started on treatment for his active MDS expeditiously.  He had now been followed for a new PET positive level 5 mediastinal lymph node.  A biopsy was recommended to rule out N2 disease.    Operative Findings: Extensive lysis of adhesions of left upper lobe to chest wall and mediastinal fat.  Level 5 lymph node identified.  It was noted to be firm.  Samples of lymph node taken sharply.  Small tear in visceral pleura of the lower lobe noted.  There was also a superficial nodule in this location.  A wedge biopsy of the lower lobe was performed.    Description of the Procedure: The patient was brought to the operating room.  General anesthesia was induced.  A double lumen endotracheal tube was placed and confirmed by anesthesia.  The patient was placed in the right lateral decubitus position.  The left chest was prepped and draped in the usual sterile fashion.  Four robotic ports and one assistant port were placed.  There was a  small tear in the visceral pleura of the left lower lobe.  A superficial nodule was also present in this location.  A wedge biopsy was thus performed with serial fires of 45mm Witherbee stapler with blue loads.  The sample was passed off the table as a specimen.  The da Go Xi robot was then docked.  There were extensive adhesions of the upper lobe to the chest wall and mediastinal fat.  These were lysed with a mix of blunt and bipolar electrocautery dissection.  The aortopulmonary window lymph node was then identified.  The pleura was incised overlying the node taking care to preserve the phrenic nerve.  The node was noted to be firm.  The node was sampled sharply with scissors.  These samples were passed off the table as a specimen.  Hemostasis was noted.  A 24Fr Huseyin drain was introduce through the assistant port and directed to the apex.  The posterior port was then removed under direct vision.  The instruments were removed.  The left lung was reexpanded under direct vision.  The robot was undocked and the ports were removed.  The port sites were closed with absorbable suture.  Sterile dressings were applied.  The patient tolerated the procedure well and was taken to the postanesthesia care unit in stable condition.    Significant Surgical Tasks Conducted by the Assistant(s), if Applicable: Shannan Corley PA-C: bedside assist to allow for two surgeons at the consoles.  No additional qualified resident was available at the time.    Wound Classification: 1, Clean    Complications: None apparent    Estimated Blood Loss (EBL): 10mL           Drains:  One 24Fr Huseyin drain(s)    Implants: None    Specimens:   Specimen (12h ago, onward)    Start     Ordered    10/04/18 0953  Specimen to Pathology - Surgery  Once     Comments:  1.  LLL wedge - Permanent2.  Level 5 LN -Permanent     Start Status   10/04/18 0953 Collected (10/04/18 0953)       10/04/18 0952                  Condition: Good    Disposition: PACU -  hemodynamically stable.    Postoperative Plan: The patient will be admitted to the thoracic surgical maki.  The chest tube will initially be placed to suction.  A postoperative chest x-ray will be performed.    Attestation: Dr. Machado was present and scrubbed or at the console for the critical portions of the case and otherwise immediately available.      Franklin Landis MD  Thoracic Surgery Resident, PGY7  General Thoracic Surgery  [facility]

## 2018-10-04 NOTE — INTERVAL H&P NOTE
The patient has been examined and the H&P has been reviewed:    I concur with the findings and no changes have occurred since H&P was written.    Anesthesia/Surgery risks, benefits and alternative options discussed and understood by patient/family.          Active Hospital Problems    Diagnosis  POA    Mediastinal adenopathy [R59.0]  Yes      Resolved Hospital Problems   No resolved problems to display.

## 2018-10-04 NOTE — BRIEF OP NOTE
[facility]  Brief Operative Note    SUMMARY     Surgery Date: 10/4/2018     Surgeon(s) and Role:     * Clint Machado MD - Primary     * NICOLAS Lockwood-TYREE - Assisting     * Marta Anglin MD - Resident - Assisting     * Franklin Landis MD - Fellow      Pre-op Diagnosis:  Pulmonary nodule, left [R91.1]    Post-op Diagnosis:  Post-Op Diagnosis Codes:     * Pulmonary nodule, left [R91.1]    Procedure(s) (LRB):  XI ROBOTIC MEDIASTINAL LYMPH NODE BIOPSY (Left)  THORACOSCOPIC WEDGE RESECTION, LUNG (Left)    Anesthesia: General    Description of Procedure: Extensive lysis of adhesions of left upper lobe to chest wall and mediastinal fat.  Level 5 lymph node identified.  It was noted to be firm.  Samples of lymph node taken sharply.  Small tear in visceral pleura of the lower lobe noted.  There was also a superficial nodule in this location.  A wedge biopsy of the lower lobe was performed.    Description of the findings of the procedure: One 24Fr Huseyin drain.    Estimated Blood Loss: 10mL         Specimens:   Specimen (12h ago, onward)    Start     Ordered    10/04/18 0953  Specimen to Pathology - Surgery  Once     Comments:  1.  LLL wedge - Permanent2.  Level 5 LN -Permanent     Start Status   10/04/18 0953 Needs to be Collected       10/04/18 0952        Franklin Landis MD  Thoracic Surgery Resident, PGY7  General Thoracic Surgery  [facility]

## 2018-10-04 NOTE — TRANSFER OF CARE
"Anesthesia Transfer of Care Note    Patient: Glen Arroyo    Procedure(s) Performed: Procedure(s) (LRB):  XI ROBOTIC RATS with lymph node biopsy (Left)  BIOPSY, LYMPH NODE  XI ROBOTIC WEDGE RESECTION, LUNG (RATS)    Patient location: PACU    Anesthesia Type: general    Transport from OR: Transported from OR on 6-10 L/min O2 by face mask with adequate spontaneous ventilation    Post pain: adequate analgesia    Post assessment: no apparent anesthetic complications and tolerated procedure well    Post vital signs: stable    Level of consciousness: sedated    Nausea/Vomiting: no nausea/vomiting    Complications: none    Transfer of care protocol was followed      Last vitals:   Visit Vitals  BP (!) 168/85   Pulse 92   Temp 36.5 °C (97.7 °F) (Temporal)   Resp 10   Ht 5' 9" (1.753 m)   Wt 54 kg (119 lb)   SpO2 98%   BMI 17.57 kg/m²     "

## 2018-10-04 NOTE — ANESTHESIA PREPROCEDURE EVALUATION
10/03/2018  Glen Arroyo is a 68 y.o., male.    Pre-operative evaluation for Procedure(s) (LRB):  XI ROBOTIC RATS with lymph node biopsy (Left)    Glen Arroyo is a 68 y.o. male male former smoker s/p left VATS DEIRDRE wedge resection (4/18) for pT2a invasive adenocarcinoma w/ concern for metastasis to R cervical chain LN going for the above procedure. Prior to surgery he was also diagnosed with MDS and needed urgent treatment. He currently follows with BMT is deemed intermediate risk and is scheduled for weekly Procrit injections. Recent had left hydrocelectomy.       Prev airway: G1 on DL; 37Fr DL ETT 31 @ lips    Patient Active Problem List   Diagnosis    AAA (abdominal aortic aneurysm) without rupture    Tobacco dependence in remission    Essential hypertension    Folate deficiency anemia    Weight loss, non-intentional    MDS (myelodysplastic syndrome)    Pulmonary emphysema    Aortic atherosclerosis    Pulmonary nodule, left    Left knee pain    Lung nodule    Adenocarcinoma, lung, left    Primary insomnia    Left hydrocele    Absolute anemia       Review of patient's allergies indicates:  No Known Allergies     No current facility-administered medications on file prior to encounter.      Current Outpatient Medications on File Prior to Encounter   Medication Sig Dispense Refill    cyanocobalamin (VITAMIN B-12) 100 MCG tablet Take 100 mcg by mouth once daily.      epoetin manuel (PROCRIT INJ) Inject as directed once a week.      folic acid (FOLVITE) 800 MCG Tab Take 800 mcg by mouth once daily.      oxyCODONE-acetaminophen (PERCOCET) 5-325 mg per tablet Take 1 tablet by mouth every 6 (six) hours as needed for Pain (Pain related to surgery and cancer diagnosis). 120 tablet 0    zinc gluconate 50 mg tablet Take 50 mg by mouth once daily.         Past Surgical History:   Procedure  Laterality Date    AANGIOPLASTY      STENT    ABDOMINAL AORTIC ANEURYSM REPAIR      APPENDECTOMY      BIOPSY-BONE MARROW Left 3/8/2018    Performed by Duke Tyler MD at [facility] OR [address] [place] [address]    CARDIAC SURGERY      COLOSTOMY      PLACEMENT AND REMOVAL- Diveticulitis related     COLOSTOMY      diverticulitis.  removed 2 foot of colon.     EXCISION OF HYDROCELE Left 2018    Procedure: HYDROCELECTOMY;  Surgeon: TIFFANIE Grant MD;  Location: [facility] OR;  Service: Urology;  Laterality: Left;  PRE-OP BY RN 2018    HYDROCELECTOMY Left 2018    Performed by TIFFANIE Grant MD at [facility] OR    LUNG CANCER SURGERY Left     WEDGE RESECTION    REPAIR ABDOMINAL-AORTIC ANEURYSM-ENDOVASCULAR GRAFT (AAA) with PTA of bilateral iliac and stents of bilateral iliacs N/A 10/20/2016    Performed by Topher Levine MD at [facility] OR [address] [place] [address]    THORACOSCOPY-VIDEO ASSISTED (VATS) W/WEDGE LUNG RESECTION Left 2018    Performed by Clint Machado MD at [facility] OR [address] [place] [address]    TONSILLECTOMY         Social History     Socioeconomic History    Marital status: Single     Spouse name: Not on file    Number of children: Not on file    Years of education: Not on file    Highest education level: Not on file   Social Needs    Financial resource strain: Not on file    Food insecurity - worry: Not on file    Food insecurity - inability: Not on file    Transportation needs - medical: Not on file    Transportation needs - non-medical: Not on file   Occupational History    Not on file   Tobacco Use    Smoking status: Former Smoker     Packs/day: 1.50     Years: 30.00     Pack years: 45.00     Types: Cigarettes     Last attempt to quit: 2017     Years since quittin.7    Smokeless tobacco: Never Used    Tobacco comment: quit 2 months ago     Substance and Sexual Activity    Alcohol use: No     Frequency: Never    Drug use: No    Sexual activity: Not Currently   Other Topics Concern    Not on file    Social History Narrative    Not on file         Vital Signs Range (Last 24H):  Temp:  [36.7 °C (98 °F)]   Pulse:  [81]   BP: (123)/(27)   SpO2:  [98 %]       CBC:   Recent Labs      10/01/18   0835   WBC  8.75   RBC  4.42*   HGB  11.7*   HCT  36.2*   PLT  93*   MCV  82   MCH  26.5*   MCHC  32.3         Diagnostic Studies:      EKG:  Vent. Rate : 075 BPM     Atrial Rate : 075 BPM     P-R Int : 142 ms          QRS Dur : 082 ms      QT Int : 402 ms       P-R-T Axes : 073 046 048 degrees     QTc Int : 448 ms    Normal sinus rhythm  Normal ECG    Confirmed by Felicia IBARRA [facility] ([id number]) on 8/15/2018 12:38:07 PM    Dobutamine Stress Echo:  EKG Conclusions:    1. The EKG portion of this study is negative for ischemia at a peak heart rate of 157 bpm (104% of predicted).   2. Blood pressure remained stable throughout the protocol  (Presenting BP: 111/60 Peak BP: 136/55).   3. No significant arrhythmias were present.   4. There were no symptoms of chest discomfort or significant dyspnea throughout the protocol.     CONCLUSIONS     1 - Normal left ventricular systolic function (EF 55-60%).     2 - Concentric remodeling.     No evidence of stress induced myocardial ischemia.    Anesthesia Evaluation      I have reviewed the Medications.   Steroids Taken In Past Year:     Review of Systems  Anesthesia Hx:  No problems with previous Anesthesia History of prior surgery of interest to airway management or planning: Previous anesthesia: General VATS with general anesthesia.  Procedure performed at an [facility]. Denies Family Hx of Anesthesia complications.   Denies Personal Hx of Anesthesia complications.   Social:  Former Smoker Quit 2017; 1.5 ppd x 35 yrs;  Stopped alcohol in 2/2018      Hematology/Oncology:         -- Anemia: Hematology Comments: Pancytopenia  Myelodysplastic syndorme Oncology Comments: Myelodysplastic syndorme; pT2a invasive adenocarcinoma     EENT/Dental:   glasses Denies Chronic Tonsillitis    Cardiovascular:   Hypertension  Functional Capacity 3 METS, walking at clinic with walker, limited as tires easily, knee pain; denies CP, SOB  Abdominal Aortic Aneurysm (bilat iliac stents 2016), s/p endovascular repair    Pulmonary:   COPD Denies Asthma.  Denies Sleep Apnea.    Renal/:  Renal/ Normal     Hepatic/GI:   Denies GERD. Wt loss 14 lbs   Musculoskeletal:   Arthritis (knees)   Spine Disorders: cervical Disc disease and Degenerative disease    Neurological:   Denies CVA. Denies Seizures.    Endocrine:   Denies Diabetes.    Psych:  Psychiatric Normal           Physical Exam  General:  Well nourished    Airway/Jaw/Neck:  Airway Findings: Mouth Opening: Small, but > 3cm Tongue: Normal  General Airway Assessment: Adult  Mallampati: I  Improves to I with phonation.  Jaw/Neck Findings:     Neck ROM: Decreased Lateral Motion, to the left, Extension Decreased, Mod.      Dental:  Dental Findings: Edentulous   Chest/Lungs:  Chest/Lungs Findings: Clear to auscultation, Normal Respiratory Rate, Decreased Breathe Sounds Left     Heart/Vascular:  Heart Findings: Rate: Normal  Rhythm: Regular Rhythm  Heart Murmur  Systolic     Abdomen:  Abdomen Findings: Normal    Musculoskeletal:  Musculoskeletal Findings: Normal   Skin:  Skin Findings: Normal    Mental Status:  Mental Status Findings:  Cooperative, Alert and Oriented         Anesthesia Plan  Type of Anesthesia, risks & benefits discussed:  Anesthesia Type:  general  Patient's Preference:   Intra-op Monitoring Plan: arterial line and standard ASA monitors  Intra-op Monitoring Plan Comments:   Post Op Pain Control Plan: per primary service following discharge from PACU and multimodal analgesia  Post Op Pain Control Plan Comments:   Induction:   IV  Beta Blocker:  Patient is not currently on a Beta-Blocker (No further documentation required).       Informed Consent: Patient understands risks and agrees with Anesthesia plan.  Questions answered. Anesthesia consent  signed with patient.  ASA Score: 3     Day of Surgery Review of History & Physical:    H&P update referred to the surgeon.  H&P completed by Anesthesiologist.   Anesthesia Plan Notes: Discussed PAWAN and A-line. Al;l questions answered.        Ready For Surgery From Anesthesia Perspective.

## 2018-10-04 NOTE — ANESTHESIA PROCEDURE NOTES
Arterial    Diagnosis: NSCLC    Patient location during procedure: done in OR  Procedure start time: 10/4/2018 7:35 AM  Timeout: 10/4/2018 7:30 AM  Procedure end time: 10/4/2018 7:44 AM  Staffing  Anesthesiologist: Parish Severino Jr., MD  Resident/CRNA: Stephen Hall MD  Anesthesiologist was present at the time of the procedure.  Preanesthetic Checklist  Completed: patient identified, site marked, surgical consent, pre-op evaluation, timeout performed, IV checked, risks and benefits discussed, monitors and equipment checked and anesthesia consent givenArterial  Skin Prep: chlorhexidine gluconate  Local Infiltration: none  Orientation: right  Location: radial  Catheter Size: 20 G  Catheter placement by Anatomical landmarks. Heme positive aspiration all ports.Insertion Attempts: 3  Assessment  Dressing: secured with tape and tegaderm  Patient: Tolerated well

## 2018-10-05 VITALS
RESPIRATION RATE: 18 BRPM | HEIGHT: 69 IN | WEIGHT: 119 LBS | OXYGEN SATURATION: 89 % | DIASTOLIC BLOOD PRESSURE: 63 MMHG | TEMPERATURE: 98 F | BODY MASS INDEX: 17.63 KG/M2 | HEART RATE: 110 BPM | SYSTOLIC BLOOD PRESSURE: 100 MMHG

## 2018-10-05 PROCEDURE — 94640 AIRWAY INHALATION TREATMENT: CPT

## 2018-10-05 PROCEDURE — 25000003 PHARM REV CODE 250: Performed by: THORACIC SURGERY (CARDIOTHORACIC VASCULAR SURGERY)

## 2018-10-05 PROCEDURE — 94761 N-INVAS EAR/PLS OXIMETRY MLT: CPT

## 2018-10-05 PROCEDURE — 27000221 HC OXYGEN, UP TO 24 HOURS

## 2018-10-05 PROCEDURE — 63600175 PHARM REV CODE 636 W HCPCS: Performed by: THORACIC SURGERY (CARDIOTHORACIC VASCULAR SURGERY)

## 2018-10-05 PROCEDURE — 25000003 PHARM REV CODE 250: Performed by: STUDENT IN AN ORGANIZED HEALTH CARE EDUCATION/TRAINING PROGRAM

## 2018-10-05 PROCEDURE — 63600175 PHARM REV CODE 636 W HCPCS: Performed by: STUDENT IN AN ORGANIZED HEALTH CARE EDUCATION/TRAINING PROGRAM

## 2018-10-05 PROCEDURE — 25000242 PHARM REV CODE 250 ALT 637 W/ HCPCS: Performed by: THORACIC SURGERY (CARDIOTHORACIC VASCULAR SURGERY)

## 2018-10-05 RX ORDER — OXYCODONE AND ACETAMINOPHEN 10; 325 MG/1; MG/1
1 TABLET ORAL EVERY 4 HOURS PRN
Qty: 46 TABLET | Refills: 0 | Status: SHIPPED | OUTPATIENT
Start: 2018-10-05

## 2018-10-05 RX ADMIN — CEFAZOLIN 2 G: 1 INJECTION, POWDER, FOR SOLUTION INTRAMUSCULAR; INTRAVENOUS at 09:10

## 2018-10-05 RX ADMIN — OXYCODONE HYDROCHLORIDE AND ACETAMINOPHEN 2 TABLET: 10; 325 TABLET ORAL at 12:10

## 2018-10-05 RX ADMIN — PANTOPRAZOLE SODIUM 40 MG: 40 TABLET, DELAYED RELEASE ORAL at 05:10

## 2018-10-05 RX ADMIN — SENNOSIDES AND DOCUSATE SODIUM 1 TABLET: 8.6; 5 TABLET ORAL at 09:10

## 2018-10-05 RX ADMIN — ENOXAPARIN SODIUM 40 MG: 100 INJECTION SUBCUTANEOUS at 09:10

## 2018-10-05 RX ADMIN — OXYCODONE HYDROCHLORIDE AND ACETAMINOPHEN 2 TABLET: 10; 325 TABLET ORAL at 05:10

## 2018-10-05 RX ADMIN — OXYCODONE HYDROCHLORIDE AND ACETAMINOPHEN 2 TABLET: 10; 325 TABLET ORAL at 09:10

## 2018-10-05 RX ADMIN — OXYCODONE HYDROCHLORIDE AND ACETAMINOPHEN 1 TABLET: 10; 325 TABLET ORAL at 06:10

## 2018-10-05 RX ADMIN — RAMELTEON 8 MG: 8 TABLET, FILM COATED ORAL at 12:10

## 2018-10-05 RX ADMIN — MORPHINE SULFATE 4 MG: 4 INJECTION, SOLUTION INTRAMUSCULAR; INTRAVENOUS at 03:10

## 2018-10-05 RX ADMIN — IPRATROPIUM BROMIDE AND ALBUTEROL SULFATE 3 ML: .5; 3 SOLUTION RESPIRATORY (INHALATION) at 07:10

## 2018-10-05 RX ADMIN — POLYETHYLENE GLYCOL 3350 17 G: 17 POWDER, FOR SOLUTION ORAL at 09:10

## 2018-10-05 RX ADMIN — IPRATROPIUM BROMIDE AND ALBUTEROL SULFATE 3 ML: .5; 3 SOLUTION RESPIRATORY (INHALATION) at 02:10

## 2018-10-05 NOTE — ASSESSMENT & PLAN NOTE
POD1 Robotic MLND    Ongoing adjustments to pain regiment.  May be chest tube related  Will remove tube  If pain improved, DC later today

## 2018-10-05 NOTE — ANESTHESIA POSTPROCEDURE EVALUATION
"Anesthesia Post Evaluation    Patient: Glen Arroyo    Procedure(s) Performed: Procedure(s) (LRB):  XI ROBOTIC RATS with lymph node biopsy (Left)  BIOPSY, LYMPH NODE  XI ROBOTIC WEDGE RESECTION, LUNG (RATS)    Final Anesthesia Type: general  Patient location during evaluation: PACU  Patient participation: Yes- Able to Participate  Level of consciousness: awake and alert  Post-procedure vital signs: reviewed and stable  Pain management: adequate  Airway patency: patent  PONV status at discharge: No PONV  Anesthetic complications: no      Cardiovascular status: blood pressure returned to baseline  Respiratory status: spontaneous ventilation and room air  Hydration status: euvolemic  Follow-up not needed.        Visit Vitals  /69 (BP Location: Left arm, Patient Position: Lying)   Pulse (!) 113   Temp 36.5 °C (97.7 °F) (Oral)   Resp 16   Ht 5' 9" (1.753 m)   Wt 54 kg (119 lb)   SpO2 (!) 92%   BMI 17.57 kg/m²       Pain/Salvador Score: Pain Assessment Performed: Yes (10/4/2018  8:39 PM)  Presence of Pain: complains of pain/discomfort (10/4/2018  8:39 PM)  Pain Rating Prior to Med Admin: 8 (10/5/2018  5:41 AM)  Pain Rating Post Med Admin: 7 (10/4/2018  8:39 PM)  Salvador Score: 9 (10/4/2018 12:45 PM)        "

## 2018-10-05 NOTE — DISCHARGE SUMMARY
[facility]  General Surgery  Discharge Summary      Patient Name: Glen Arroyo  MRN: [id number]  Admission Date: 10/4/2018  Hospital Length of Stay: 1 days  Discharge Date and Time:  10/05/2018 5:30 PM  Attending Physician: Clint Machado MD   Discharging Provider: Facundo Tello MD  Primary Care Provider: Sheldon Bailey MD     HPI: Pt presented for elective RATS desire dissection.  Post op had pain control issues related to the chest tube.  Tube was removed with improved pain.  DC home in stable condition.  Pt with oxygen saturation of 89-90% on room air without SOB at DC    Procedure(s) (LRB):  XI ROBOTIC RATS with lymph node biopsy (Left)  BIOPSY, LYMPH NODE  XI ROBOTIC WEDGE RESECTION, LUNG (RATS)     Hospital Course: As above    Consults:     Significant Diagnostic Studies:see medical record for CXR    Pending Diagnostic Studies:     None        Final Active Diagnoses:    Diagnosis Date Noted POA    PRINCIPAL PROBLEM:  Mediastinal adenopathy [R59.0] 10/04/2018 Yes    MDS (myelodysplastic syndrome) [D46.9] 03/21/2018 Yes      Problems Resolved During this Admission:      Discharged Condition: good    Disposition: Home or Self Care    Follow Up:    Patient Instructions:      X-Ray Chest PA And Lateral   Standing Status: Future Standing Exp. Date: 10/05/19     Order Specific Question Answer Comments   Reason for Exam: s/p VATS LN excision    May the Radiologist modify the order per protocol to meet the clinical needs of the patient? Yes      Lifting restrictions   Order Comments: No more than 10 lbs     Notify your health care provider if you experience any of the following:  increased confusion or weakness     Notify your health care provider if you experience any of the following:  persistent dizziness, light-headedness, or visual disturbances     Notify your health care provider if you experience any of the following:  worsening rash     Notify your health care provider if you  experience any of the following:  severe persistent headache     Notify your health care provider if you experience any of the following:  difficulty breathing or increased cough     Notify your health care provider if you experience any of the following:  redness, tenderness, or signs of infection (pain, swelling, redness, odor or green/yellow discharge around incision site)     Notify your health care provider if you experience any of the following:  severe uncontrolled pain     Notify your health care provider if you experience any of the following:  persistent nausea and vomiting or diarrhea     Notify your health care provider if you experience any of the following:  temperature >100.4     No dressing needed   Order Comments: No dressing needed.  Ok to change a dressing if needed PRN.  Ok to shower gently over an incision.  No swimming or soaking.     Medications:  Reconciled Home Medications:      Medication List      START taking these medications    oxyCODONE-acetaminophen  mg per tablet  Commonly known as:  PERCOCET  Take 1 tablet by mouth every 4 (four) hours as needed. Pain related to surgery for cancer.  Replaces:  oxyCODONE-acetaminophen 5-325 mg per tablet        CONTINUE taking these medications    folic acid 800 MCG Tab  Commonly known as:  FOLVITE  Take 800 mcg by mouth once daily.     PROCRIT INJ  Inject as directed once a week.     temazepam 15 mg Cap  Commonly known as:  RESTORIL  Take 1 capsule (15 mg total) by mouth nightly as needed.     VITAMIN B-12 100 MCG tablet  Generic drug:  cyanocobalamin  Take 100 mcg by mouth once daily.     zinc gluconate 50 mg tablet  Take 50 mg by mouth once daily.        STOP taking these medications    oxyCODONE-acetaminophen 5-325 mg per tablet  Commonly known as:  PERCOCET  Replaced by:  oxyCODONE-acetaminophen  mg per tablet            Facundo Tello MD  General Surgery  [facility]

## 2018-10-05 NOTE — SUBJECTIVE & OBJECTIVE
Interval History: Pain overnight with high narcotic requirements     Medications:  Continuous Infusions:  Scheduled Meds:   albuterol-ipratropium  3 mL Nebulization Q6H    ceFAZolin (ANCEF) IVPB  2 g Intravenous Q8H    enoxaparin  40 mg Subcutaneous Q24H    lidocaine  1 patch Transdermal Q24H    pantoprazole  40 mg Oral Before breakfast    polyethylene glycol  17 g Oral Daily    senna-docusate 8.6-50 mg  1 tablet Oral BID     PRN Meds:acetaminophen, bisacodyl, lactulose, metoclopramide HCl, morphine, ondansetron, oxyCODONE-acetaminophen, oxyCODONE-acetaminophen     Review of patient's allergies indicates:  No Known Allergies  Objective:     Vital Signs (Most Recent):  Temp: 97.7 °F (36.5 °C) (10/05/18 0529)  Pulse: (!) 112 (10/05/18 0723)  Resp: 20 (10/05/18 0723)  BP: 123/69 (10/05/18 0529)  SpO2: (!) 91 % (10/05/18 0724) Vital Signs (24h Range):  Temp:  [96.2 °F (35.7 °C)-98 °F (36.7 °C)] 97.7 °F (36.5 °C)  Pulse:  [] 112  Resp:  [10-24] 20  SpO2:  [91 %-98 %] 91 %  BP: ()/(59-85) 123/69     Intake/Output - Last 3 Shifts       10/03 0700 - 10/04 0659 10/04 0700 - 10/05 0659 10/05 0700 - 10/06 0659    P.O.  360     I.V. (mL/kg)  1500 (27.8)     Total Intake(mL/kg)  1860 (34.4)     Urine (mL/kg/hr)  750 (0.6)     Chest Tube  170     Total Output  920     Net  +940                  SpO2: (!) 91 %  O2 Device (Oxygen Therapy): nasal cannula    Physical Exam   NAD  Chest tube with s/s output.  No leak    Significant Labs:  BMP: No results for input(s): GLU, NA, K, CL, CO2, BUN, CREATININE, CALCIUM, MG in the last 48 hours.  CBC:   Recent Labs   Lab  10/04/18   0534   WBC  8.83   RBC  4.31*   HGB  11.5*   HCT  36.1*   PLT  73*   MCV  84   MCH  26.7*   MCHC  31.9*       Significant Diagnostics:  CXR reviewed    VTE Risk Mitigation (From admission, onward)        Ordered     enoxaparin injection 40 mg  Every 24 hours (non-standard times)      10/04/18 1015     IP VTE HIGH RISK PATIENT  Once      10/04/18  1015     Place MICHELINE hose  Until discontinued      10/04/18 0529     Place sequential compression device  Until discontinued      10/04/18 0529

## 2018-10-05 NOTE — PLAN OF CARE
Sheldon Bailey MD  [address] / [place] [address]    Future Appointments   Date Time Provider Department Center   10/15/2018  9:45 AM LAB,  HOSPITAL WB LAB [facility]   10/17/2018  2:00 PM Paula Westbrook MD [facility] HEM ONC [facility]   10/17/2018  3:00 PM CHAIR [facility] CHEMO [facility]   10/19/2018  1:15 PM TIFFANIE Grant MD [facility] URO [facility]         [facility] [facility] [place] [facility] [address] [address] [facility]  [address] [facility] [place] [address]  Phone: [phone] Fax: [phone]    [facility] [facility] [place] [facility] [address] [address] [facility]  [address] [facility] [place] [address]  Phone: [phone] Fax: [phone]    [employer] [address] [place] [address] [place] - [address] AT [facility] [address]  [address]  [facility] [place] [address]  Phone: [phone] Fax: [phone]      Payor: [employer] MEDICARE / Plan: [employer] 65 / Product Type: Medicare Advantage /        10/05/18 1054   Discharge Assessment   Assessment Type Discharge Planning Assessment   Confirmed/corrected address and phone number on facesheet? Yes   Assessment information obtained from? Patient   Prior to hospitilization cognitive status: Alert/Oriented   Prior to hospitalization functional status: Assistive Equipment   Current cognitive status: Alert/Oriented   Current Functional Status: Assistive Equipment   Lives With significant other   Able to Return to Prior Arrangements yes   Is patient able to care for self after discharge? Yes   Who are your caregiver(s) and their phone number(s)? (Arlen Hernandez  significant other [phone])   Patient's perception of discharge disposition home or selfcare   Patient currently receives any other outside agency services? No   Equipment Currently Used at Home cane, straight   Do you have any problems affording any of your prescribed medications? TBD   Does the patient have transportation home? Yes   Transportation  Available family or friend will provide   Discharge Plan A Home with family   Discharge Plan B Home with family;Home Health   Patient/Family In Agreement With Plan yes

## 2018-10-05 NOTE — PROGRESS NOTES
Pt disharged to home. Discharge instructions given verbally and hard copy provided to patient and wife. Hard copy prescription given to patient. Right FA 16g and Left FA 16g IV d/c'd with cath tip in place. Pt remains free of falls. No acute pain or distress noted.

## 2018-10-05 NOTE — PROGRESS NOTES
[facility]  Thoracic Surgery  Progress Note    Subjective:     History of Present Illness:  No notes on file    Post-Op Info:  Procedure(s) (LRB):  XI ROBOTIC RATS with lymph node biopsy (Left)  BIOPSY, LYMPH NODE  XI ROBOTIC WEDGE RESECTION, LUNG (RATS)   1 Day Post-Op     Interval History: Pain overnight with high narcotic requirements     Medications:  Continuous Infusions:  Scheduled Meds:   albuterol-ipratropium  3 mL Nebulization Q6H    ceFAZolin (ANCEF) IVPB  2 g Intravenous Q8H    enoxaparin  40 mg Subcutaneous Q24H    lidocaine  1 patch Transdermal Q24H    pantoprazole  40 mg Oral Before breakfast    polyethylene glycol  17 g Oral Daily    senna-docusate 8.6-50 mg  1 tablet Oral BID     PRN Meds:acetaminophen, bisacodyl, lactulose, metoclopramide HCl, morphine, ondansetron, oxyCODONE-acetaminophen, oxyCODONE-acetaminophen     Review of patient's allergies indicates:  No Known Allergies  Objective:     Vital Signs (Most Recent):  Temp: 97.7 °F (36.5 °C) (10/05/18 0529)  Pulse: (!) 112 (10/05/18 0723)  Resp: 20 (10/05/18 0723)  BP: 123/69 (10/05/18 0529)  SpO2: (!) 91 % (10/05/18 0724) Vital Signs (24h Range):  Temp:  [96.2 °F (35.7 °C)-98 °F (36.7 °C)] 97.7 °F (36.5 °C)  Pulse:  [] 112  Resp:  [10-24] 20  SpO2:  [91 %-98 %] 91 %  BP: ()/(59-85) 123/69     Intake/Output - Last 3 Shifts       10/03 0700 - 10/04 0659 10/04 0700 - 10/05 0659 10/05 0700 - 10/06 0659    P.O.  360     I.V. (mL/kg)  1500 (27.8)     Total Intake(mL/kg)  1860 (34.4)     Urine (mL/kg/hr)  750 (0.6)     Chest Tube  170     Total Output  920     Net  +940                  SpO2: (!) 91 %  O2 Device (Oxygen Therapy): nasal cannula    Physical Exam   NAD  Chest tube with s/s output.  No leak    Significant Labs:  BMP: No results for input(s): GLU, NA, K, CL, CO2, BUN, CREATININE, CALCIUM, MG in the last 48 hours.  CBC:   Recent Labs   Lab  10/04/18   0534   WBC  8.83   RBC  4.31*   HGB  11.5*   HCT   36.1*   PLT  73*   MCV  84   MCH  26.7*   MCHC  31.9*       Significant Diagnostics:  CXR reviewed    VTE Risk Mitigation (From admission, onward)        Ordered     enoxaparin injection 40 mg  Every 24 hours (non-standard times)      10/04/18 1015     IP VTE HIGH RISK PATIENT  Once      10/04/18 1015     Place MCIHELINE hose  Until discontinued      10/04/18 0529     Place sequential compression device  Until discontinued      10/04/18 0529        Assessment/Plan:     MDS (myelodysplastic syndrome)    POD1 Robotic MLND    Ongoing adjustments to pain regiment.  May be chest tube related  Will remove tube  If pain improved, DC later today            Facundo Tello MD  Thoracic Surgery  [facility]

## 2018-10-05 NOTE — PLAN OF CARE
Problem: Patient Care Overview  Goal: Plan of Care Review  Outcome: Ongoing (interventions implemented as appropriate)  Plan of care reviewed with the patient,, a 68 year old male with the DX of Mediastinal Adenopathy,,, admitted on the 4th of this month,,, he is S/P a Robotic Rats procedure with lymph node removal,, alert and oriented,, issues with pain all night,,, patient claimed that dilaudid did nothing for him,, also stated that 20 mg's of perc did nothing either,, dilaudid was removed from his MAR as a breakthrough med and replaced with morphine 4 mg's,,, a sleep aide was requested also, a one time dose was given,, good output from his L flank chest tube,,, 2L with a NC,, urinal at bedside,,, slept very little last night,, WCTM,,, all vitals WNL,,,bed locked and low call light in reach,,,,,

## 2018-10-08 ENCOUNTER — TELEPHONE (OUTPATIENT)
Dept: CARDIOTHORACIC SURGERY | Facility: HOSPITAL | Age: 68
End: 2018-10-08

## 2018-10-08 LAB
BLD PROD TYP BPU: NORMAL
BLD PROD TYP BPU: NORMAL
BLOOD UNIT EXPIRATION DATE: NORMAL
BLOOD UNIT EXPIRATION DATE: NORMAL
BLOOD UNIT TYPE CODE: 600
BLOOD UNIT TYPE CODE: 600
BLOOD UNIT TYPE: NORMAL
BLOOD UNIT TYPE: NORMAL
CODING SYSTEM: NORMAL
CODING SYSTEM: NORMAL
DISPENSE STATUS: NORMAL
DISPENSE STATUS: NORMAL
NUM UNITS TRANS PACKED RBC: NORMAL
NUM UNITS TRANS PACKED RBC: NORMAL

## 2018-10-08 NOTE — TELEPHONE ENCOUNTER
Call and spoke with patient following hospital discharge. He is recovering well at home. Pain is controlled with prn meds. Mild drainage from chest tube site, instructed them to change bandage as needed. Okay to leave uncovered once no drainage. Remove all other bandages but leave steri strips. Laxative to increase bowel regimen. Follow-up appointments reviewed. Will mail appointments. No additional questions.     CB

## 2018-10-10 ENCOUNTER — TELEPHONE (OUTPATIENT)
Dept: FAMILY MEDICINE | Facility: CLINIC | Age: 68
End: 2018-10-10

## 2018-10-10 NOTE — TELEPHONE ENCOUNTER
----- Message from Sheldon Bailey MD sent at 10/10/2018 11:15 AM CDT -----  Contact: Mari/[phone]  Please call Mari at number in email.    Dr. Bailey     ----- Message -----  From: Shirin Gregg MA  Sent: 10/10/2018   9:04 AM  To: Sheldon Bailey MD        ----- Message -----  From: Mike Castlelano  Sent: 10/10/2018   8:58 AM  To: Lynn Guerra from  [facility] called to notify Dr. Bailey of the patients death. Mari would like someone to contact her regarding this matter.      Thank you

## 2018-10-15 NOTE — PHYSICIAN QUERY
"PT Name: Glen Arroyo  MR #: [id number]    Physician Query Form - Pathology Findings Clarification     CDS/: Aylin Gonzalez RN, CCDS              Contact information: [email or web address]  This form is a permanent document in the medical record.     Query Date: October 15, 2018      By submitting this query, we are merely seeking further clarification of documentation.  Please utilize your independent clinical judgment when addressing the question(s) below.      The medical record contains the following:     Findings Supporting Clinical Information Location in Medical Record   FINAL PATHOLOGIC DIAGNOSIS  1. Lung, left lower lobe, wedge resection:  -INVASIVE ADENOCARCINOMA, ACINAR PREDOMINANT, see synoptic report    Regional lymph nodes: one "lymph node" positive for metastatic carcinoma    -FRAGMENT OF FIBROUS TISSUE POSITIVE FOR METASTATIC CARCINOMA, CONSISTENT WITH LUNG  PRIMARY  10/4 pathology      Please document the clinical significance of the Pathologists findings of:   Lung, Left Lower Lobe, wedge resection: _Invasive Adenocarcinoma, Acinar Predominant.  One lymph node positive for metastatic carcinoma.             [ X ] I agree with the Pathology Findings        [  ] I do not agree with the Pathology Findings        [  ] Clinically Insignificant        [  ] Clinically Undetermined        [  ] Other/Clarification of Findings: ______________________________________________    Please document in your progress notes daily for the duration of treatment until resolved and include in your discharge summary.                   "

## 2018-11-17 NOTE — PROGRESS NOTES
History & Physical    SUBJECTIVE:     History of Present Illness:  Patient is a 68 y/o male former smoker with AAA s/p PTA and stenting of bilateral iliacs, diverticulitis, HTN, tobacco abuse, and newly diagnosed MDS presenting for incidentally found 2.0 x 0.8cm spiculated nodule in DEIRDRE apex. Nodule likely dating back to 2016 with an increasing solid component. Recent hospital admission for asymptomatic anemia and now follows with hematology for MDS. Hematology would like treatment for lung nodule prior to treatment for MDS. Recommendations: leukoreduced, irradiated PRBC for hemoglobin < 7, Irradiated platelets for platelet count < 56627. He is using a walker today but reports left knee pain starting this weekend. Otherwise, he does not typically need a walker. Endorses decreased appetite and fatigue. Denies fever, chills, CP, SOB, nausea, vomiting. Denies prior cardiac history. Not on blood thinners.     Former smoker. Quit smoking Dec '17. 45 pack year history.   PSH: partial colectomy, colostomy and reversal, appendectomy, AAA endovascular repair      Chief Complaint   Patient presents with    Consult       Review of patient's allergies indicates:  No Known Allergies    Current Outpatient Prescriptions   Medication Sig Dispense Refill    carisoprodol (SOMA) 350 MG tablet take 1 tablet by mouth three times a day 10 days  0    cyanocobalamin (VITAMIN B-12) 100 MCG tablet Take 100 mcg by mouth once daily.      folic acid (FOLVITE) 800 MCG Tab Take 800 mcg by mouth once daily.      losartan-hydrochlorothiazide 50-12.5 mg (HYZAAR) 50-12.5 mg per tablet Take 1 tablet by mouth once daily.      oxyCODONE-acetaminophen (PERCOCET) 5-325 mg per tablet       zinc gluconate 50 mg tablet Take 50 mg by mouth once daily.       No current facility-administered medications for this visit.        Past Medical History:   Diagnosis Date    AAA (abdominal aortic aneurysm) 11/20/2016    Diverticulitis     HTN (hypertension)   "    Past Surgical History:   Procedure Laterality Date    ABDOMINAL AORTIC ANEURYSM REPAIR      APPENDECTOMY      COLOSTOMY      PLACEMENT AND REMOVAL    COLOSTOMY      diverticulitis.  removed 2 foot of colon.      Family History   Problem Relation Age of Onset    Cancer Mother     Cancer Father      Social History   Substance Use Topics    Smoking status: Former Smoker     Packs/day: 1.50     Years: 30.00     Types: Cigarettes     Quit date: 12/20/2017    Smokeless tobacco: Never Used      Comment: quit 2 months ago      Alcohol use No        Review of Systems:  Review of Systems   Constitutional: Positive for activity change (decreased), appetite change (decreased) and fatigue. Negative for chills, diaphoresis and fever.   HENT: Negative for congestion.    Respiratory: Negative for cough, chest tightness and shortness of breath.    Cardiovascular: Negative for chest pain, palpitations and leg swelling.   Gastrointestinal: Negative for abdominal pain, nausea and vomiting.   Genitourinary: Negative for difficulty urinating.   Musculoskeletal: Positive for arthralgias (L knee).   Skin: Negative for rash.   Neurological: Negative for dizziness and syncope.   Hematological: Bruises/bleeds easily.   Psychiatric/Behavioral: Negative for agitation. The patient is not nervous/anxious.        OBJECTIVE:     Vital Signs (Most Recent)  Pulse: 107 (04/06/18 0904)  BP: 94/60 (04/06/18 0904)  SpO2: 99 % (04/06/18 0904)  5' 9" (1.753 m)  63.2 kg (139 lb 5.3 oz)     Physical Exam:  Physical Exam   Constitutional: He is oriented to person, place, and time. He appears well-developed and well-nourished.   HENT:   Head: Normocephalic and atraumatic.   Eyes: EOM are normal.   Neck: Neck supple. No tracheal deviation present.   Cardiovascular: Normal rate, regular rhythm, normal heart sounds and intact distal pulses.    Pulmonary/Chest: Effort normal and breath sounds normal. No respiratory distress. He has no wheezes. "   Abdominal: Soft.   Musculoskeletal: Normal range of motion. He exhibits no edema or tenderness.   Lymphadenopathy:     He has no cervical adenopathy.   Neurological: He is alert and oriented to person, place, and time.   Skin: Skin is warm and dry.   Psychiatric: He has a normal mood and affect. Thought content normal.   Vitals reviewed.    Lab Results   Component Value Date    WBC 2.68 (L) 03/16/2018    HGB 7.7 (L) 03/16/2018    HCT 23.0 (L) 03/16/2018    MCV 96 03/16/2018    PLT 42 (L) 03/16/2018       Chest CT 2/25/18:  1.  Spiculated 2.0 x 0.8 cm nodule in the left lung apex on a background of diffuse paraseptal emphysema.  The findings are concerning for primary pulmonary malignancy.  PET/CT scan or tissue sampling is suggested for further evaluation.  2.  Airspace opacities in the bilateral lower lobes.  3. Extensive coronary artery calcifications.  This may be a marker of coronary artery disease.  4.  Additional findings as above.    PFTS:    FEV1 2.94L 94%, DLCO 11.10 64%      ASSESSMENT/PLAN:     Patient is a 68 y/o male former smoker with AAA s/p PTA and stenting of bilateral iliacs, diverticulitis, HTN, tobacco abuse, and newly diagnosed MDS presenting for incidentally found 2.0 x 0.8cm spiculated nodule in DEIRDRE apex with increasing solid component.     PLAN:Plan     PET scheduled for next week to evaluate for distant disease. If no evidence of distant disease, will proceed with surgical resection of DEIRDRE lesion.   Pending above proceed with left VATS for DEIRDRE therapeutic wedge resection, possible thoracotomy. Will need blood work on the day prior to surgery to reassess blood counts for possible pre-operative/perioperative transfusions. Would like platelets to be >50 for surgical intervention. Heme recommendations for leukoreduced, irradiated PRBC for hemoglobin < 7, Irradiated platelets for platelet count < 94547.  Appropriate patient education regarding the bruna-operative period as well as intraperative  details were discussed. Risks, including but not limited to, bleeding, infection, pain and anesthetic complication were discussed. Patient was given the opportunity to ask questions and to have those questions answered to their satisfaction. Patient verbalized understanding to both procedure and associated risks. Consent was obtained.      ATTENDING ATTESTATION:    I evaluated the patient and I agree with the assessment and plan.  I recommend a L VATS DEIRDRE wedge vs non-anatomic LULobe segmentectomy.  There is a small likelihood that a minithoracotomy will be required. I would like to perform a quick procedure that has the greatest chance at a speedy recovery.   This will minimize delays to starting treatment for myelodysplastic syndrome.  We will order leukocyte-poor platelets and administer at the time of surgery.  I have discussed the technical aspects, risks and benefits of the procedure with the patient.  I did inform the patient that the risks are the most common risks and that there are other less likely risks that are too numerous to elaborate.  The patient is aware and has agreed to undergo the procedure as detailed on the consent form.            no

## 2021-03-25 NOTE — PROGRESS NOTES
MyCHART and Electronic Signature for HOD    Date: 3.25.2021    Patient has or is agreeable to set up a [employer] account, Y    Tip Sheets for [employer] account and [employer] Link emailed to patient  Y    Instruction that patient must complete pre-checkin with in 3 days prior to starting program. Y    Introduction to Virtual HOD Therapy information emailed to patient. Y    Patient to start program on: 3.29.2021                 Routine Office Visit    Patient Name: Glen Arroyo    : 1950  MRN: [id number]    Subjective:  Glen is a 67 y.o. male who presents today for:    1. Sleep  Patient presenting today for poor sleep.  He states that the vistaril isn't helping much.  He is currently taking percocet before bedtime and then again around 2AM where he sits up for an hour before going back to bed.  His wife states that he does sleep throughout the day.  He is taking the percocet as prescribed.  He states that he does snore, but has not had a sleep study done.  Patient has lost about 15 pounds in the past 3 months.  He is currently undergoing treatment for MDS and is being managed by hem/onc.    Past Medical History  Past Medical History:   Diagnosis Date    AAA (abdominal aortic aneurysm) 2016    Diverticulitis     HTN (hypertension)        Past Surgical History  Past Surgical History:   Procedure Laterality Date    ABDOMINAL AORTIC ANEURYSM REPAIR      APPENDECTOMY      COLOSTOMY      PLACEMENT AND REMOVAL- Diveticulitis related     COLOSTOMY      diverticulitis.  removed 2 foot of colon.        Family History  Family History   Problem Relation Age of Onset    Cancer Mother     Cancer Father     Anesthesia problems Neg Hx        Social History  Social History     Social History    Marital status: Single     Spouse name: N/A    Number of children: N/A    Years of education: N/A     Occupational History    Not on file.     Social History Main Topics    Smoking status: Former Smoker     Packs/day: 1.50     Years: 30.00     Types: Cigarettes     Quit date: 2017    Smokeless tobacco: Never Used      Comment: quit 2 months ago      Alcohol use No    Drug use: No    Sexual activity: Not Currently     Other Topics Concern    Not on file     Social History Narrative    No narrative on file       Current Medications  Current Outpatient Prescriptions on File Prior to Visit   Medication Sig Dispense Refill     "cyanocobalamin (VITAMIN B-12) 100 MCG tablet Take 100 mcg by mouth once daily.      folic acid (FOLVITE) 800 MCG Tab Take 800 mcg by mouth once daily.      hydrOXYzine pamoate (VISTARIL) 50 MG Cap Take 1 capsule (50 mg total) by mouth nightly as needed. 30 capsule 0    losartan-hydrochlorothiazide 50-12.5 mg (HYZAAR) 50-12.5 mg per tablet Take 1 tablet by mouth every morning. 90 tablet 1    oxyCODONE-acetaminophen (PERCOCET) 5-325 mg per tablet Take 1 tablet by mouth every 4 (four) hours as needed for Pain (Pain related to surgery and cancer diagnosis). 60 tablet 0    zinc gluconate 50 mg tablet Take 50 mg by mouth once daily.      acetaminophen (TYLENOL) 500 MG tablet Take 500 mg by mouth every 6 (six) hours as needed for Pain.       No current facility-administered medications on file prior to visit.        Allergies   Review of patient's allergies indicates:  No Known Allergies    Review of Systems (Pertinent positives)  Review of Systems   Constitutional: Negative.    HENT: Negative.    Eyes: Negative.    Respiratory: Negative.    Cardiovascular: Negative.    Gastrointestinal: Negative.    Musculoskeletal: Positive for myalgias.   Skin: Negative.    Psychiatric/Behavioral: The patient has insomnia.          BP 94/60 (BP Location: Right arm, Patient Position: Sitting, BP Method: Medium (Manual))   Pulse 101   Temp 98.3 °F (36.8 °C) (Oral)   Resp 16   Ht 5' 9" (1.753 m)   Wt 59.8 kg (131 lb 13.4 oz)   SpO2 97%   BMI 19.47 kg/m²     GENERAL APPEARANCE: in no apparent distress and well developed and well nourished  HEENT: PERRL, EOMI, Sclera clear, anicteric, Oropharynx clear, no lesions, Neck supple with midline trachea  NECK: normal, supple, no adenopathy, thyroid normal in size  RESPIRATORY: appears well, vitals normal, no respiratory distress, acyanotic, normal RR, chest clear, no wheezing, crepitations, rhonchi, normal symmetric air entry  HEART: regular rate and rhythm, S1, S2 normal, no murmur, " click, rub or gallop.    ABDOMEN: abdomen is soft without tenderness, no masses, no hernias, no organomegaly, no rebound, no guarding. Suprapubic tenderness absent. No CVA tenderness.    SKIN: no rashes, no wounds, no other lesions  PSYCH: Alert, oriented x 3, thought content appropriate, speech normal, pleasant and cooperative, good eye contact, well groomed    Assessment/Plan:  Glen Arroyo is a 67 y.o. male who presents today for :    Glen was seen today for abdominal pain.    Diagnoses and all orders for this visit:    Primary insomnia  -     mirtazapine (REMERON SOL-TAB) 15 MG disintegrating tablet; Take 1 tablet (15 mg total) by mouth every evening.      1.  remeron ordered for both sleep and weight gain  2.  He is to have labs done today  3.  Call Friday if no improvement in sleep  4.  Patient informed that goal is to have him sleep through the night so that he does not require percocet at 2AM  5.  He is to call with any concerns  6.  He will consider sleep study       Sheldon Bailey MD

## 2022-11-14 NOTE — CARE UPDATE
Attending attestation note    I was available at all times during the patient's care, while the advance care provider evaluated the patient within the emergency department.  I was available to provide consultation, offer advice, and full evaluation of the patient.  I did not see the patient myself.    Gautam Holland MD     I have interviewed and examined Mr. Arroyo and agree with Dr. Gonsales's H&P. H&H improved with 2 units RBCs. Mr. Arroyo reported improvement in symptoms after transfusion. He has pancytopenia with an inappropriately normal reticulocyte count, normal iron studies and B12. I suspect bone marrow toxicity from chronic alcohol use but have consulted Hem/Onc for their input.

## 2024-09-06 NOTE — TELEPHONE ENCOUNTER
2nd Call back, No answer, Left message on VM.    no chills/no decreased eating/drinking/no dizziness/no fever/no nausea/no tingling/no vomiting/no weakness

## 2024-11-19 NOTE — PLAN OF CARE
"BMI for Adults  Body mass index (BMI) is a number found using a person's weight and height. BMI can help tell how much of a person's weight is made up of fat. BMI does not measure body fat directly. It is used instead of tests that directly measure body fat, which can be difficult and expensive.  What are BMI measurements used for?  BMI is useful to:  Find out if your weight puts you at higher risk for medical problems.  Help recommend changes, such as in diet and exercise. This can help you reach a healthy weight. BMI screening can be done again to see if these changes are working.  How is BMI calculated?  Your height and weight are measured. The BMI is found from those numbers. This can be done with [place] or metric measurements. Note that charts and online BMI calculators are available to help you find your BMI quickly and easily without doing these calculations.  To calculate your BMI in [place] measurements:  Measure your weight in pounds (lb).  Multiply the number of pounds by 703.  So, for an adult who weighs 150 lb, multiply that number by 703: 150 x 703, which equals 105,450.  Measure your height in inches. Then multiply that number by itself to get a measurement called \"inches squared.\"  So, for an adult who is 70 inches tall, the \"inches squared\" measurement is 70 inches x 70 inches, which equals 4,900 inches squared.  Divide the total from step 2 (number of lb x 703) by the total from step 3 (inches squared): 105,450 ÷ 4,900 = 21.5. This is your BMI.  To calculate your BMI in metric measurements:    Measure your weight in kilograms (kg).  For this example, the weight is 70 kg.  Measure your height in meters (m). Then multiply that number by itself to get a measurement called \"meters squared.\"  So, for an adult who is 1.75 m tall, the \"meters squared\" measurement is 1.75 m x 1.75 m, which equals 3.1 meters squared.  Divide the number of kilograms (your weight) by the meters squared number. In this example: 70 " Problem: Patient Care Overview  Goal: Plan of Care Review  Outcome: Ongoing (interventions implemented as appropriate)  Tolerated Procrit. No complaints voiced.       ÷ 3.1 = 22.6. This is your BMI.  What do the results mean?  BMI charts are used to see if you are underweight, normal weight, overweight, or obese. The following guidelines will be used:  Underweight: BMI less than 18.5.  Normal weight: BMI between 18.5 and 24.9.  Overweight: BMI between 25 and 29.9.  Obese: BMI of 30 or above.  BMI is a tool and cannot diagnose a condition. Talk with your health care provider about what your BMI means for you. Keep these notes in mind:  Weight includes fat and muscle. Someone with a muscular build, such as an athlete, may have a BMI that is higher than 24.9. In cases like these, BMI is not a correct measure of body fat.  If you have a BMI of 25 or higher, your provider may need to do more testing to find out if excess body fat is the cause.  BMI is measured the same way for males and females. Females usually have more body fat than males of the same height and weight.  Where to find more information  For more information about BMI, including tools to quickly find your BMI, go to:  Centers for Disease Control and Prevention: cdc.gov  American Heart Association: heart.org  National Heart, Lung, and [place]: nhlbi.nih.gov  This information is not intended to replace advice given to you by your health care provider. Make sure you discuss any questions you have with your health care provider.  Document Revised: 09/07/2023 Document Reviewed: 08/31/2023  [employer] Patient Education © 2024 [employer]  Advance Directive    Advance directives are legal documents that allow you to make decisions about your health care and medical treatment in case you become unable to communicate for yourself. Advance directives let your wishes be known to family, friends, and health care providers.  Discussing and writing advance directives should happen over time rather than all at once. Advance directives can be changed and updated at any time. There are different types of advance directives,  such as:  Medical power of .  Living will.  Do not resuscitate (DNR) order or do not attempt resuscitation (DNAR) order.  Health care proxy and medical power of   A health care proxy is also called a health care agent. This person is appointed to make medical decisions for you when you are unable to make decisions for yourself. Generally, people ask a trusted friend or family member to act as their proxy and represent their preferences. Make sure you have an agreement with your trusted person to act as your proxy. A proxy may have to make a medical decision on your behalf if your wishes are not known.  A medical power of , also called a durable power of  for health care, is a legal document that names your health care proxy. Depending on the laws in your state, the document may need to be:  Signed.  Notarized.  Dated.  Copied.  Witnessed.  Incorporated into your medical record.  You may also want to appoint a trusted person to manage your money in the event you are unable to do so. This is called a durable power of  for finances. It is a separate legal document from the durable power of  for health care. You may choose your health care proxy or someone different to act as your agent in money matters.  If you do not appoint a proxy, or there is a concern that the proxy is not acting in your best interest, a court may appoint a guardian to act on your behalf.  Living will  A living will is a set of instructions that state your wishes about medical care when you cannot express them yourself. Health care providers should keep a copy of your living will in your medical record. You may want to give a copy to family members or friends. To alert caregivers in case of an emergency, you can place a card in your wallet to let them know that you have a living will and where they can find it. A living will is used if you become:  Terminally ill.  Disabled.  Unable to communicate or  make decisions.  The following decisions should be included in your living will:  To use or not to use life support equipment, such as dialysis machines and breathing machines (ventilators).  Whether you want a DNR or DNAR order. This tells health care providers not to use cardiopulmonary resuscitation (CPR) if breathing or heartbeat stops.  To use or not to use tube feeding.  To be given or not to be given food and fluids.  Whether you want comfort (palliative) care when the goal becomes comfort rather than a cure.  Whether you want to donate your organs and tissues.  A living will does not give instructions for distributing your money and property if you should pass away.  DNR or DNAR  A DNR or DNAR order is a request not to have CPR in the event that your heart stops beating or you stop breathing. If a DNR or DNAR order has not been made and shared, a health care provider will try to help any patient whose heart has stopped or who has stopped breathing. If you plan to have surgery, talk with your health care provider about how your DNR or DNAR order will be followed if problems occur.  What if I do not have an advance directive?  Some states assign family decision makers to act on your behalf if you do not have an advance directive. Each state has its own laws about advance directives. You may want to check with your health care provider, , or state representative about the laws in your state.  Summary  Advance directives are legal documents that allow you to make decisions about your health care and medical treatment in case you become unable to communicate for yourself.  The process of discussing and writing advance directives should happen over time. You can change and update advance directives at any time.  Advance directives may include a medical power of , a living will, and a DNR or DNAR order.  This information is not intended to replace advice given to you by your health care provider.  Make sure you discuss any questions you have with your health care provider.  Document Revised: 09/21/2021 Document Reviewed: 09/21/2021  [employer] Patient Education © 2024 [employer]  Fall Prevention in the Home, Adult  Falls can cause injuries and can happen to people of all ages. There are many things you can do to make your home safer and to help prevent falls.  What actions can I take to prevent falls?  General information  Use good lighting in all rooms. Make sure to:  Replace any light bulbs that burn out.  Turn on the lights in dark areas and use night-lights.  Keep items that you use often in easy-to-reach places. Lower the shelves around your home if needed.  Move furniture so that there are clear paths around it.  Do not use throw rugs or other things on the floor that can make you trip.  If any of your floors are uneven, fix them.  Add color or contrast paint or tape to clearly [place] and help you see:  Grab bars or handrails.  First and last steps of staircases.  Where the edge of each step is.  If you use a ladder or stepladder:  Make sure that it is fully opened. Do not climb a closed ladder.  Make sure the sides of the ladder are locked in place.  Have someone hold the ladder while you use it.  Know where your pets are as you move through your home.  What can I do in the bathroom?         Keep the floor dry. Clean up any water on the floor right away.  Remove soap buildup in the bathtub or shower. Buildup makes bathtubs and showers slippery.  Use non-skid mats or decals on the floor of the bathtub or shower.  Attach bath mats securely with double-sided, non-slip rug tape.  If you need to sit down in the shower, use a non-slip stool.  Install grab bars by the toilet and in the bathtub and shower. Do not use towel bars as grab bars.  What can I do in the bedroom?  Make sure that you have a light by your bed that is easy to reach.  Do not use any sheets or blankets on your bed that hang to the  floor.  Have a firm chair or bench with side arms that you can use for support when you get dressed.  What can I do in the kitchen?  Clean up any spills right away.  If you need to reach something above you, use a step stool with a grab bar.  Keep electrical cords out of the way.  Do not use floor polish or wax that makes floors slippery.  What can I do with my stairs?  Do not leave anything on the stairs.  Make sure that you have a light switch at the top and the bottom of the stairs.  Make sure that there are handrails on both sides of the stairs. Fix handrails that are broken or loose.  Install non-slip stair treads on all your stairs if they do not have carpet.  Avoid having throw rugs at the top or bottom of the stairs.  Choose a carpet that does not hide the edge of the steps on the stairs. Make sure that the carpet is firmly attached to the stairs. Fix carpet that is loose or worn.  What can I do on the outside of my home?  Use bright outdoor lighting.  Fix the edges of walkways and driveways and fix any cracks. Clear paths of anything that can make you trip, such as tools or rocks.  Add color or contrast paint or tape to clearly whit[place] and help you see anything that might make you trip as you walk through a door, such as a raised step or threshold.  Trim any bushes or trees on paths to your home.  Check to see if handrails are loose or broken and that both sides of all steps have handrails. Install guardrails along the edges of any raised decks and porches.  Have leaves, snow, or ice cleared regularly. Use sand, salt, or ice melter on paths if you live where there is ice and snow during the winter.  Clean up any spills in your garage right away. This includes grease or oil spills.  What other actions can I take?  Review your medicines with your doctor. Some medicines can cause dizziness or changes in blood pressure, which increase your risk of falling.  Wear shoes that:  Have a low heel. Do not wear high  heels.  Have rubber bottoms and are closed at the toe.  Feel good on your feet and fit well.  Use tools that help you move around if needed. These include:  Canes.  Walkers.  Scooters.  Crutches.  Ask your doctor what else you can do to help prevent falls. This may include seeing a physical therapist to learn to do exercises to move better and get stronger.  Where to find more information  Centers for Disease Control and Prevention, [place]: cdc.gov  National [place] on Aging: bro.nih.gov  National [place] on Aging: bro.nih.gov  Contact a doctor if:  You are afraid of falling at home.  You feel weak, drowsy, or dizzy at home.  You fall at home.  Get help right away if you:  Lose consciousness or have trouble moving after a fall.  Have a fall that causes a head injury.  These symptoms may be an emergency. Get help right away. Call 911.  Do not wait to see if the symptoms will go away.  Do not drive yourself to the hospital.  This information is not intended to replace advice given to you by your health care provider. Make sure you discuss any questions you have with your health care provider.  Document Revised: 08/21/2023 Document Reviewed: 08/21/2023  [employer]vier Patient Education © 2024 [employer]

## 2025-06-02 NOTE — TELEPHONE ENCOUNTER
Pt returned call, informed of need for Zinc supplementation, he voiced understanding.  Dose is 1mg/kg so approximately 60mg daily. Also informed pt that I am waiting to hear from the  about his bone marrow biopsy & would call him when I had the details.   Medical decision making

## (undated) DEVICE — SEE MEDLINE ITEM 152622

## (undated) DEVICE — RELOAD ECHELON ENDOPATH 45MM

## (undated) DEVICE — DRAPE ARM DAVINCI XI

## (undated) DEVICE — ELECTRODE BLADE INSULATED 1 IN

## (undated) DEVICE — SYR SLIP TIP 10ML SHIELD

## (undated) DEVICE — ADHESIVE MASTISOL VIAL 48/BX

## (undated) DEVICE — GAUZE SPONGE 4X4 12PLY

## (undated) DEVICE — NDL SPINAL 18GX3.5 SPINOCAN

## (undated) DEVICE — GOWN SMART IMP BREATHABLE XXLG

## (undated) DEVICE — SUT SILK 0 STRANDS 30IN BLK

## (undated) DEVICE — COVER LIGHT HANDLE

## (undated) DEVICE — APPLICATOR CHLORAPREP ORN 26ML

## (undated) DEVICE — DRESSING ADH FILM TELFA 2X3IN

## (undated) DEVICE — COVER LIGHT HANDLE 80/CA

## (undated) DEVICE — MARKER SKIN STND TIP BLUE BARR

## (undated) DEVICE — PACK ENDOSCOPY GENERAL

## (undated) DEVICE — CLOSURE SKIN STERI STRIP 1/2X4

## (undated) DEVICE — SEE MEDLINE ITEM 146313

## (undated) DEVICE — SOL ELECTROLUBE ANTI-STIC

## (undated) DEVICE — SEE MEDLINE ITEM 157131

## (undated) DEVICE — CLIPPER BLADE MOD 4406 (CAREF)

## (undated) DEVICE — NDL 22GA X1 1/2 REG BEVEL

## (undated) DEVICE — ELECTRODE REM PLYHSV RETURN 9

## (undated) DEVICE — DRESSING TELFA STRL 4X3 LF

## (undated) DEVICE — DRESSING ADH ISLAND 3.6 X 14

## (undated) DEVICE — SUT PROLENE 5-0 RB1 8756H

## (undated) DEVICE — DRAPE ABDOMINAL TIBURON 14X11

## (undated) DEVICE — TAPE SILK 3IN

## (undated) DEVICE — CONTAINER SPECIMEN STRL 4OZ

## (undated) DEVICE — CATH THORACIC 24FR ST

## (undated) DEVICE — GOWN B1 X-LG X-LONG

## (undated) DEVICE — NDL SPINAL 20GX3.5 HUB

## (undated) DEVICE — SUT VICRYL 3-0 27 SH

## (undated) DEVICE — SPONGE IV DRAIN 4X4 STERILE

## (undated) DEVICE — SEE MEDLINE ITEM 157144

## (undated) DEVICE — TAPE MEDIPORE 4IN X 2YDS

## (undated) DEVICE — SOL NS 1000CC

## (undated) DEVICE — SYR SLIP TIP 20CC

## (undated) DEVICE — SUT CHROMIC 3-0 SH 27IN GUT

## (undated) DEVICE — SYR ONLY LUER LOCK 20CC

## (undated) DEVICE — BAG TISS RETRV MONARCH 10MM

## (undated) DEVICE — SUSPENSORY LG

## (undated) DEVICE — SUT CHROMIC 3-0 PS2 27IN BR

## (undated) DEVICE — COVER OVERHEAD SURG LT BLUE

## (undated) DEVICE — DRAPE STERI INSTRUMENT 1018

## (undated) DEVICE — SEE L#133928

## (undated) DEVICE — LINER GLOVE POWDERFREE 8

## (undated) DEVICE — SUT 3/0 18IN COATED VICRYLP

## (undated) DEVICE — TRAY MINOR GEN SURG

## (undated) DEVICE — PACK DRAPE UNIVERSAL CONVERTOR

## (undated) DEVICE — BLADE ELECTRO EDGE INSULATED

## (undated) DEVICE — SEE MEDLINE ITEM 157128

## (undated) DEVICE — LOOP VESSEL BLUE MAXI

## (undated) DEVICE — CLOSURE SKIN STERI STRIP 1/4X4

## (undated) DEVICE — SUT CHROMIC 2-0 SH 27IN BRN

## (undated) DEVICE — CANNULA REDUCER 12-8MM

## (undated) DEVICE — SEE L#120831

## (undated) DEVICE — GLOVE SURG BIOGEL LATEX SZ 7.5

## (undated) DEVICE — DRAPE SCOPE PILLOW WARMER

## (undated) DEVICE — APPLIER CLIP ENDO LIGAMAX 5MM

## (undated) DEVICE — SPONGE LAP 18X18 PREWASHED

## (undated) DEVICE — CANISTER SUCTION 2 LTR

## (undated) DEVICE — DRAPE COLUMN DAVINCI XI

## (undated) DEVICE — DRESSING TEGADERM 4.4X5IN

## (undated) DEVICE — CANNULA SEAL 12MM

## (undated) DEVICE — CHLORAPREP W TINT 26ML APPL

## (undated) DEVICE — SUT PERMA HAND SILK BLK 0-0

## (undated) DEVICE — NDL SAFETY 22G X 1.5 ECLIPSE

## (undated) DEVICE — VALVE OLYMPUS SCOPE SUCTION

## (undated) DEVICE — SOL WATER STRL IRR 1000ML

## (undated) DEVICE — PLEDGET SUT SOFT 3/8X3/16X1/16

## (undated) DEVICE — DRAPE INCISE IOBAN 2 23X17IN

## (undated) DEVICE — SEE MEDLINE ITEM 157117

## (undated) DEVICE — SUT SILK 0 BLK BR FSL 18 IN

## (undated) DEVICE — DRAIN CHEST DRY SUCTION

## (undated) DEVICE — SEAL UNIVERSAL 5MM-8MM XI

## (undated) DEVICE — SUT VICRYL CTD 2-0 GI 27 SH

## (undated) DEVICE — SUT 2/0 30IN SILK BLK BRAI

## (undated) DEVICE — SUT MCRYL PLUS 4-0 PS2 27IN

## (undated) DEVICE — Device

## (undated) DEVICE — BLANKET UPPER BODY 78.7X29.9IN

## (undated) DEVICE — DRESSING TRANS 4X4 TEGADERM

## (undated) DEVICE — SET TRI-LUMEN FILTERED TUBE

## (undated) DEVICE — NDL 18GA X1 1/2 REG BEVEL

## (undated) DEVICE — DRESSING TRANS 2X2 TEGADERM